# Patient Record
Sex: FEMALE | Race: WHITE | NOT HISPANIC OR LATINO | Employment: OTHER | RURAL
[De-identification: names, ages, dates, MRNs, and addresses within clinical notes are randomized per-mention and may not be internally consistent; named-entity substitution may affect disease eponyms.]

---

## 2020-02-04 ENCOUNTER — HISTORICAL (OUTPATIENT)
Dept: ADMINISTRATIVE | Facility: HOSPITAL | Age: 75
End: 2020-02-04

## 2020-02-05 LAB
LAB AP CLINICAL INFORMATION: NORMAL
LAB AP DIAGNOSIS - HISTORICAL: NORMAL
LAB AP GROSS PATHOLOGY - HISTORICAL: NORMAL
LAB AP SPECIMEN SUBMITTED - HISTORICAL: NORMAL

## 2020-03-10 ENCOUNTER — HISTORICAL (OUTPATIENT)
Dept: ADMINISTRATIVE | Facility: HOSPITAL | Age: 75
End: 2020-03-10

## 2020-07-08 ENCOUNTER — HISTORICAL (OUTPATIENT)
Dept: ADMINISTRATIVE | Facility: HOSPITAL | Age: 75
End: 2020-07-08

## 2020-07-08 LAB
ALBUMIN SERPL BCP-MCNC: 3.4 G/DL (ref 3.5–5)
ALBUMIN/GLOB SERPL: 0.9 {RATIO}
ALP SERPL-CCNC: 53 U/L (ref 55–142)
ALT SERPL W P-5'-P-CCNC: 24 U/L (ref 13–56)
ANION GAP SERPL CALCULATED.3IONS-SCNC: 9.1 MMOL/L (ref 7–16)
AST SERPL W P-5'-P-CCNC: 18 U/L (ref 15–37)
BASOPHILS # BLD AUTO: 0.04 X10E3/UL (ref 0–0.2)
BASOPHILS NFR BLD AUTO: 0.8 % (ref 0–1)
BILIRUB SERPL-MCNC: 0.4 MG/DL (ref 0–1.2)
BUN SERPL-MCNC: 23 MG/DL (ref 7–18)
BUN/CREAT SERPL: 26
CALCIUM SERPL-MCNC: 8.7 MG/DL (ref 8.5–10.1)
CHLORIDE SERPL-SCNC: 104 MMOL/L (ref 98–107)
CHOLEST SERPL-MCNC: 199 MG/DL
CHOLEST/HDLC SERPL: 4.1 {RATIO}
CO2 SERPL-SCNC: 30 MMOL/L (ref 21–32)
CREAT SERPL-MCNC: 0.9 MG/DL (ref 0.5–1.02)
EOSINOPHIL # BLD AUTO: 0.15 X10E3/UL (ref 0–0.5)
EOSINOPHIL NFR BLD AUTO: 3 % (ref 1–4)
ERYTHROCYTE [DISTWIDTH] IN BLOOD BY AUTOMATED COUNT: 13.9 % (ref 11.5–14.5)
GLOBULIN SER-MCNC: 3.7 G/DL (ref 2–4)
GLUCOSE SERPL-MCNC: 133 MG/DL (ref 74–106)
HCT VFR BLD AUTO: 41.6 % (ref 38–47)
HDLC SERPL-MCNC: 48 MG/DL
HGB BLD-MCNC: 12.8 G/DL (ref 12–16)
IMM GRANULOCYTES # BLD AUTO: 0.01 X10E3/UL (ref 0–0.04)
IMM GRANULOCYTES NFR BLD: 0.2 % (ref 0–0.4)
LDLC SERPL CALC-MCNC: 131 MG/DL
LYMPHOCYTES # BLD AUTO: 1.49 X10E3/UL (ref 1–4.8)
LYMPHOCYTES NFR BLD AUTO: 30.1 % (ref 27–41)
MCH RBC QN AUTO: 25.7 PG (ref 27–31)
MCHC RBC AUTO-ENTMCNC: 30.8 G/DL (ref 32–36)
MCV RBC AUTO: 83.5 FL (ref 80–96)
MONOCYTES # BLD AUTO: 0.62 X10E3/UL (ref 0–0.8)
MONOCYTES NFR BLD AUTO: 12.5 % (ref 2–6)
MPC BLD CALC-MCNC: 10.2 FL (ref 9.4–12.4)
NEUTROPHILS # BLD AUTO: 2.64 X10E3/UL (ref 1.8–7.7)
NEUTROPHILS NFR BLD AUTO: 53.4 % (ref 53–65)
NRBC # BLD AUTO: 0 X10E3/UL (ref 0–0)
NRBC, AUTO (.00): 0 /100 (ref 0–0)
PLATELET # BLD AUTO: 315 X10E3/UL (ref 150–400)
POTASSIUM SERPL-SCNC: 4.1 MMOL/L (ref 3.5–5.1)
PROT SERPL-MCNC: 7.1 G/DL (ref 6.4–8.2)
RBC # BLD AUTO: 4.98 X10E6/UL (ref 4.2–5.4)
SODIUM SERPL-SCNC: 139 MMOL/L (ref 136–145)
TRIGL SERPL-MCNC: 100 MG/DL
WBC # BLD AUTO: 4.95 X10E3/UL (ref 4.5–11)

## 2020-08-13 ENCOUNTER — HISTORICAL (OUTPATIENT)
Dept: ADMINISTRATIVE | Facility: HOSPITAL | Age: 75
End: 2020-08-13

## 2020-08-13 LAB
ALBUMIN SERPL BCP-MCNC: 3.3 G/DL (ref 3.5–5)
ALBUMIN/GLOB SERPL: 0.8 {RATIO}
ALP SERPL-CCNC: 58 U/L (ref 55–142)
ALT SERPL W P-5'-P-CCNC: 26 U/L (ref 13–56)
ANION GAP SERPL CALCULATED.3IONS-SCNC: 8 MMOL/L (ref 7–16)
AST SERPL W P-5'-P-CCNC: 15 U/L (ref 15–37)
BASOPHILS # BLD AUTO: 0.03 X10E3/UL (ref 0–0.2)
BASOPHILS NFR BLD AUTO: 0.6 % (ref 0–1)
BILIRUB SERPL-MCNC: 0.4 MG/DL (ref 0–1.2)
BUN SERPL-MCNC: 15 MG/DL (ref 7–18)
BUN/CREAT SERPL: 18
CALCIUM SERPL-MCNC: 9 MG/DL (ref 8.5–10.1)
CHLORIDE SERPL-SCNC: 103 MMOL/L (ref 98–107)
CHOLEST SERPL-MCNC: 206 MG/DL
CHOLEST/HDLC SERPL: 5.6 {RATIO}
CO2 SERPL-SCNC: 31 MMOL/L (ref 21–32)
CREAT SERPL-MCNC: 0.85 MG/DL (ref 0.5–1.02)
EOSINOPHIL # BLD AUTO: 0.13 X10E3/UL (ref 0–0.5)
EOSINOPHIL NFR BLD AUTO: 2.5 % (ref 1–4)
ERYTHROCYTE [DISTWIDTH] IN BLOOD BY AUTOMATED COUNT: 14.5 % (ref 11.5–14.5)
GLOBULIN SER-MCNC: 4.3 G/DL (ref 2–4)
GLUCOSE SERPL-MCNC: 106 MG/DL (ref 74–106)
HCT VFR BLD AUTO: 41.5 % (ref 38–47)
HDLC SERPL-MCNC: 37 MG/DL
HGB BLD-MCNC: 12.6 G/DL (ref 12–16)
IMM GRANULOCYTES # BLD AUTO: 0.02 X10E3/UL (ref 0–0.04)
IMM GRANULOCYTES NFR BLD: 0.4 % (ref 0–0.4)
LDLC SERPL CALC-MCNC: 136 MG/DL
LYMPHOCYTES # BLD AUTO: 1.56 X10E3/UL (ref 1–4.8)
LYMPHOCYTES NFR BLD AUTO: 29.5 % (ref 27–41)
MCH RBC QN AUTO: 25.6 PG (ref 27–31)
MCHC RBC AUTO-ENTMCNC: 30.4 G/DL (ref 32–36)
MCV RBC AUTO: 84.3 FL (ref 80–96)
MONOCYTES # BLD AUTO: 0.65 X10E3/UL (ref 0–0.8)
MONOCYTES NFR BLD AUTO: 12.3 % (ref 2–6)
MPC BLD CALC-MCNC: 10.2 FL (ref 9.4–12.4)
NEUTROPHILS # BLD AUTO: 2.89 X10E3/UL (ref 1.8–7.7)
NEUTROPHILS NFR BLD AUTO: 54.7 % (ref 53–65)
NRBC # BLD AUTO: 0 X10E3/UL (ref 0–0)
NRBC, AUTO (.00): 0 /100 (ref 0–0)
PLATELET # BLD AUTO: 309 X10E3/UL (ref 150–400)
POTASSIUM SERPL-SCNC: 4.2 MMOL/L (ref 3.5–5.1)
PROT SERPL-MCNC: 7.6 G/DL (ref 6.4–8.2)
RBC # BLD AUTO: 4.92 X10E6/UL (ref 4.2–5.4)
SODIUM SERPL-SCNC: 138 MMOL/L (ref 136–145)
TRIGL SERPL-MCNC: 167 MG/DL
WBC # BLD AUTO: 5.28 X10E3/UL (ref 4.5–11)

## 2020-09-17 ENCOUNTER — HISTORICAL (OUTPATIENT)
Dept: ADMINISTRATIVE | Facility: HOSPITAL | Age: 75
End: 2020-09-17

## 2020-09-17 LAB
ALBUMIN SERPL BCP-MCNC: 3.4 G/DL (ref 3.5–5)
ALBUMIN/GLOB SERPL: 0.8 {RATIO}
ALP SERPL-CCNC: 52 U/L (ref 55–142)
ALT SERPL W P-5'-P-CCNC: 27 U/L (ref 13–56)
ANION GAP SERPL CALCULATED.3IONS-SCNC: 9.3 MMOL/L (ref 7–16)
AST SERPL W P-5'-P-CCNC: 18 U/L (ref 15–37)
BASOPHILS # BLD AUTO: 0.04 X10E3/UL (ref 0–0.2)
BASOPHILS NFR BLD AUTO: 0.7 % (ref 0–1)
BILIRUB SERPL-MCNC: 0.4 MG/DL (ref 0–1.2)
BUN SERPL-MCNC: 16 MG/DL (ref 7–18)
BUN/CREAT SERPL: 17
CALCIUM SERPL-MCNC: 9.6 MG/DL (ref 8.5–10.1)
CHLORIDE SERPL-SCNC: 103 MMOL/L (ref 98–107)
CHOLEST SERPL-MCNC: 200 MG/DL
CHOLEST/HDLC SERPL: 4.9 {RATIO}
CO2 SERPL-SCNC: 30 MMOL/L (ref 21–32)
CREAT SERPL-MCNC: 0.92 MG/DL (ref 0.5–1.02)
EOSINOPHIL # BLD AUTO: 0.15 X10E3/UL (ref 0–0.5)
EOSINOPHIL NFR BLD AUTO: 2.5 % (ref 1–4)
ERYTHROCYTE [DISTWIDTH] IN BLOOD BY AUTOMATED COUNT: 14.7 % (ref 11.5–14.5)
GLOBULIN SER-MCNC: 4.2 G/DL (ref 2–4)
GLUCOSE SERPL-MCNC: 88 MG/DL (ref 74–106)
HCT VFR BLD AUTO: 41.1 % (ref 38–47)
HDLC SERPL-MCNC: 41 MG/DL
HGB BLD-MCNC: 12.8 G/DL (ref 12–16)
IMM GRANULOCYTES # BLD AUTO: 0.02 X10E3/UL (ref 0–0.04)
IMM GRANULOCYTES NFR BLD: 0.3 % (ref 0–0.4)
LDLC SERPL CALC-MCNC: 126 MG/DL
LYMPHOCYTES # BLD AUTO: 1.9 X10E3/UL (ref 1–4.8)
LYMPHOCYTES NFR BLD AUTO: 31.3 % (ref 27–41)
MCH RBC QN AUTO: 26.2 PG (ref 27–31)
MCHC RBC AUTO-ENTMCNC: 31.1 G/DL (ref 32–36)
MCV RBC AUTO: 84 FL (ref 80–96)
MONOCYTES # BLD AUTO: 0.73 X10E3/UL (ref 0–0.8)
MONOCYTES NFR BLD AUTO: 12 % (ref 2–6)
MPC BLD CALC-MCNC: 10.2 FL (ref 9.4–12.4)
NEUTROPHILS # BLD AUTO: 3.24 X10E3/UL (ref 1.8–7.7)
NEUTROPHILS NFR BLD AUTO: 53.2 % (ref 53–65)
NRBC # BLD AUTO: 0 X10E3/UL (ref 0–0)
NRBC, AUTO (.00): 0 /100 (ref 0–0)
PLATELET # BLD AUTO: 365 X10E3/UL (ref 150–400)
POTASSIUM SERPL-SCNC: 4.3 MMOL/L (ref 3.5–5.1)
PROT SERPL-MCNC: 7.6 G/DL (ref 6.4–8.2)
RBC # BLD AUTO: 4.89 X10E6/UL (ref 4.2–5.4)
SODIUM SERPL-SCNC: 138 MMOL/L (ref 136–145)
TRIGL SERPL-MCNC: 166 MG/DL
WBC # BLD AUTO: 6.08 X10E3/UL (ref 4.5–11)

## 2020-12-09 ENCOUNTER — HISTORICAL (OUTPATIENT)
Dept: ADMINISTRATIVE | Facility: HOSPITAL | Age: 75
End: 2020-12-09

## 2021-01-06 ENCOUNTER — HISTORICAL (OUTPATIENT)
Dept: ADMINISTRATIVE | Facility: HOSPITAL | Age: 76
End: 2021-01-06

## 2021-01-06 LAB
ALBUMIN SERPL BCP-MCNC: 4 G/DL (ref 3.5–5)
ALBUMIN/GLOB SERPL: 0.9 {RATIO}
ALP SERPL-CCNC: 76 U/L (ref 55–142)
ALT SERPL W P-5'-P-CCNC: 33 U/L (ref 13–56)
ANION GAP SERPL CALCULATED.3IONS-SCNC: 8.6 MMOL/L (ref 7–16)
AST SERPL W P-5'-P-CCNC: 18 U/L (ref 15–37)
BASOPHILS # BLD AUTO: 0.04 X10E3/UL (ref 0–0.2)
BASOPHILS NFR BLD AUTO: 0.5 % (ref 0–1)
BILIRUB SERPL-MCNC: 0.4 MG/DL (ref 0–1.2)
BUN SERPL-MCNC: 25 MG/DL (ref 7–18)
BUN/CREAT SERPL: 27
CALCIUM SERPL-MCNC: 8.9 MG/DL (ref 8.5–10.1)
CHLORIDE SERPL-SCNC: 104 MMOL/L (ref 98–107)
CHOLEST SERPL-MCNC: 162 MG/DL
CHOLEST/HDLC SERPL: 2.6 {RATIO}
CO2 SERPL-SCNC: 31 MMOL/L (ref 21–32)
CREAT SERPL-MCNC: 0.92 MG/DL (ref 0.5–1.02)
EOSINOPHIL # BLD AUTO: 0.2 X10E3/UL (ref 0–0.5)
EOSINOPHIL NFR BLD AUTO: 2.5 % (ref 1–4)
ERYTHROCYTE [DISTWIDTH] IN BLOOD BY AUTOMATED COUNT: 14.3 % (ref 11.5–14.5)
GLOBULIN SER-MCNC: 4.3 G/DL (ref 2–4)
GLUCOSE SERPL-MCNC: 136 MG/DL (ref 74–106)
HCT VFR BLD AUTO: 45.7 % (ref 38–47)
HDLC SERPL-MCNC: 63 MG/DL
HGB BLD-MCNC: 14 G/DL (ref 12–16)
IMM GRANULOCYTES # BLD AUTO: 0.03 X10E3/UL (ref 0–0.04)
IMM GRANULOCYTES NFR BLD: 0.4 % (ref 0–0.4)
LDLC SERPL CALC-MCNC: 80 MG/DL
LYMPHOCYTES # BLD AUTO: 1.86 X10E3/UL (ref 1–4.8)
LYMPHOCYTES NFR BLD AUTO: 23.6 % (ref 27–41)
MCH RBC QN AUTO: 26.1 PG (ref 27–31)
MCHC RBC AUTO-ENTMCNC: 30.6 G/DL (ref 32–36)
MCV RBC AUTO: 85.1 FL (ref 80–96)
MONOCYTES # BLD AUTO: 0.88 X10E3/UL (ref 0–0.8)
MONOCYTES NFR BLD AUTO: 11.2 % (ref 2–6)
MPC BLD CALC-MCNC: 10.7 FL (ref 9.4–12.4)
NEUTROPHILS # BLD AUTO: 4.86 X10E3/UL (ref 1.8–7.7)
NEUTROPHILS NFR BLD AUTO: 61.8 % (ref 53–65)
NRBC # BLD AUTO: 0 X10E3/UL (ref 0–0)
NRBC, AUTO (.00): 0 /100 (ref 0–0)
PLATELET # BLD AUTO: 379 X10E3/UL (ref 150–400)
POTASSIUM SERPL-SCNC: 4.6 MMOL/L (ref 3.5–5.1)
PROT SERPL-MCNC: 8.3 G/DL (ref 6.4–8.2)
RBC # BLD AUTO: 5.37 X10E6/UL (ref 4.2–5.4)
SODIUM SERPL-SCNC: 139 MMOL/L (ref 136–145)
TRIGL SERPL-MCNC: 95 MG/DL
WBC # BLD AUTO: 7.87 X10E3/UL (ref 4.5–11)

## 2021-03-31 RX ORDER — ASPIRIN 325 MG
325 TABLET ORAL DAILY
COMMUNITY
End: 2021-12-03

## 2021-04-05 ENCOUNTER — OFFICE VISIT (OUTPATIENT)
Dept: DERMATOLOGY | Facility: CLINIC | Age: 76
End: 2021-04-05
Payer: MEDICARE

## 2021-04-05 VITALS — BODY MASS INDEX: 35.48 KG/M2 | HEIGHT: 59 IN | RESPIRATION RATE: 18 BRPM | WEIGHT: 176 LBS

## 2021-04-05 DIAGNOSIS — L40.0 PLAQUE PSORIASIS: Primary | ICD-10-CM

## 2021-04-05 DIAGNOSIS — Z79.899 HIGH RISK MEDICATION USE: ICD-10-CM

## 2021-04-05 PROCEDURE — 99214 OFFICE O/P EST MOD 30 MIN: CPT | Mod: ,,, | Performed by: DERMATOLOGY

## 2021-04-05 PROCEDURE — 99214 PR OFFICE/OUTPT VISIT, EST, LEVL IV, 30-39 MIN: ICD-10-PCS | Mod: ,,, | Performed by: DERMATOLOGY

## 2021-04-05 RX ORDER — BUTALBITAL, ACETAMINOPHEN AND CAFFEINE 300; 40; 50 MG/1; MG/1; MG/1
CAPSULE ORAL
COMMUNITY
Start: 2021-02-04 | End: 2021-12-03 | Stop reason: SDUPTHER

## 2021-04-05 RX ORDER — ACITRETIN 25 MG/1
CAPSULE ORAL
COMMUNITY
Start: 2020-09-17 | End: 2021-04-05

## 2021-04-05 RX ORDER — POTASSIUM CHLORIDE 1500 MG/1
TABLET, EXTENDED RELEASE ORAL
COMMUNITY
Start: 2020-07-17 | End: 2021-12-03 | Stop reason: SDUPTHER

## 2021-04-05 RX ORDER — ACITRETIN 25 MG/1
25 CAPSULE ORAL DAILY
COMMUNITY
Start: 2021-03-13 | End: 2021-04-05

## 2021-04-05 RX ORDER — DULOXETIN HYDROCHLORIDE 30 MG/1
CAPSULE, DELAYED RELEASE ORAL
COMMUNITY
Start: 2020-07-24 | End: 2021-12-03 | Stop reason: SDUPTHER

## 2021-04-05 RX ORDER — CITALOPRAM 10 MG/1
10 TABLET ORAL DAILY
COMMUNITY
Start: 2021-01-06 | End: 2021-06-01 | Stop reason: SDUPTHER

## 2021-04-05 RX ORDER — METOPROLOL TARTRATE 25 MG/1
TABLET, FILM COATED ORAL
COMMUNITY
Start: 2020-10-14 | End: 2021-06-01 | Stop reason: SDUPTHER

## 2021-04-05 RX ORDER — HYDROCHLOROTHIAZIDE 25 MG/1
25 TABLET ORAL DAILY
COMMUNITY
Start: 2021-01-06 | End: 2021-06-01 | Stop reason: SDUPTHER

## 2021-04-05 RX ORDER — HYDROCHLOROTHIAZIDE 25 MG/1
TABLET ORAL
COMMUNITY
Start: 2020-07-17 | End: 2022-01-11

## 2021-04-05 RX ORDER — CIPROFLOXACIN HYDROCHLORIDE 3 MG/ML
SOLUTION/ DROPS OPHTHALMIC
COMMUNITY
Start: 2020-09-15 | End: 2022-01-11

## 2021-04-05 RX ORDER — DULOXETIN HYDROCHLORIDE 30 MG/1
CAPSULE, DELAYED RELEASE ORAL
COMMUNITY
Start: 2021-01-31 | End: 2021-12-03 | Stop reason: SDUPTHER

## 2021-04-05 RX ORDER — LATANOPROST 50 UG/ML
SOLUTION/ DROPS OPHTHALMIC
COMMUNITY
Start: 2021-02-24 | End: 2022-12-27

## 2021-04-05 RX ORDER — TOPIRAMATE 50 MG/1
TABLET, FILM COATED ORAL
COMMUNITY
Start: 2021-02-04 | End: 2022-01-31

## 2021-04-05 RX ORDER — ACITRETIN 25 MG/1
CAPSULE ORAL
Qty: 30 CAPSULE | Refills: 2 | Status: SHIPPED | OUTPATIENT
Start: 2021-04-05 | End: 2021-06-01 | Stop reason: SDUPTHER

## 2021-04-05 RX ORDER — OMEPRAZOLE 40 MG/1
40 CAPSULE, DELAYED RELEASE ORAL DAILY
COMMUNITY
Start: 2021-01-06 | End: 2021-06-01 | Stop reason: SDUPTHER

## 2021-04-05 RX ORDER — POTASSIUM CHLORIDE 20 MEQ/1
20 TABLET, EXTENDED RELEASE ORAL DAILY
COMMUNITY
Start: 2021-01-06 | End: 2021-06-01 | Stop reason: SDUPTHER

## 2021-04-05 RX ORDER — BUTALBITAL, ACETAMINOPHEN AND CAFFEINE 50; 325; 40 MG/1; MG/1; MG/1
CAPSULE ORAL
COMMUNITY
Start: 2021-02-10 | End: 2022-01-31

## 2021-04-05 RX ORDER — CITALOPRAM 10 MG/1
TABLET ORAL
COMMUNITY
Start: 2020-10-14 | End: 2022-01-11

## 2021-04-05 RX ORDER — OMEPRAZOLE 40 MG/1
CAPSULE, DELAYED RELEASE ORAL
COMMUNITY
Start: 2020-10-14 | End: 2021-12-03 | Stop reason: SDUPTHER

## 2021-04-05 RX ORDER — CEFUROXIME AXETIL 500 MG/1
TABLET ORAL
COMMUNITY
Start: 2021-02-04 | End: 2021-12-03

## 2021-04-06 ENCOUNTER — HISTORICAL (OUTPATIENT)
Dept: ADMINISTRATIVE | Facility: HOSPITAL | Age: 76
End: 2021-04-06

## 2021-04-06 LAB
ALBUMIN SERPL BCP-MCNC: 3.7 G/DL (ref 3.5–5)
ALBUMIN/GLOB SERPL: 1 {RATIO}
ALP SERPL-CCNC: 61 U/L (ref 55–142)
ALT SERPL W P-5'-P-CCNC: 29 U/L (ref 13–56)
ANION GAP SERPL CALCULATED.3IONS-SCNC: 10.8 MMOL/L (ref 7–16)
AST SERPL W P-5'-P-CCNC: 19 U/L (ref 15–37)
BASOPHILS # BLD AUTO: 0.04 X10E3/UL (ref 0–0.2)
BASOPHILS NFR BLD AUTO: 0.7 % (ref 0–1)
BILIRUB SERPL-MCNC: 0.4 MG/DL (ref 0–1.2)
BUN SERPL-MCNC: 24 MG/DL (ref 7–18)
BUN/CREAT SERPL: 26
CALCIUM SERPL-MCNC: 8.9 MG/DL (ref 8.5–10.1)
CHLORIDE SERPL-SCNC: 104 MMOL/L (ref 98–107)
CHOLEST SERPL-MCNC: 210 MG/DL
CHOLEST/HDLC SERPL: 3.8 {RATIO}
CO2 SERPL-SCNC: 30 MMOL/L (ref 21–32)
CREAT SERPL-MCNC: 0.93 MG/DL (ref 0.5–1.02)
EOSINOPHIL # BLD AUTO: 0.16 X10E3/UL (ref 0–0.5)
EOSINOPHIL NFR BLD AUTO: 2.9 % (ref 1–4)
ERYTHROCYTE [DISTWIDTH] IN BLOOD BY AUTOMATED COUNT: 14.6 % (ref 11.5–14.5)
GLOBULIN SER-MCNC: 3.8 G/DL (ref 2–4)
GLUCOSE SERPL-MCNC: 155 MG/DL (ref 74–106)
HCT VFR BLD AUTO: 44.7 % (ref 38–47)
HDLC SERPL-MCNC: 55 MG/DL
HGB BLD-MCNC: 13.7 G/DL (ref 12–16)
IMM GRANULOCYTES # BLD AUTO: 0.01 X10E3/UL (ref 0–0.04)
IMM GRANULOCYTES NFR BLD: 0.2 % (ref 0–0.4)
LDLC SERPL CALC-MCNC: 132 MG/DL
LYMPHOCYTES # BLD AUTO: 1.4 X10E3/UL (ref 1–4.8)
LYMPHOCYTES NFR BLD AUTO: 25.4 % (ref 27–41)
MCH RBC QN AUTO: 26.2 PG (ref 27–31)
MCHC RBC AUTO-ENTMCNC: 30.6 G/DL (ref 32–36)
MCV RBC AUTO: 85.5 FL (ref 80–96)
MONOCYTES # BLD AUTO: 0.58 X10E3/UL (ref 0–0.8)
MONOCYTES NFR BLD AUTO: 10.5 % (ref 2–6)
MPC BLD CALC-MCNC: 10.6 FL (ref 9.4–12.4)
NEUTROPHILS # BLD AUTO: 3.32 X10E3/UL (ref 1.8–7.7)
NEUTROPHILS NFR BLD AUTO: 60.3 % (ref 53–65)
NRBC # BLD AUTO: 0 X10E3/UL (ref 0–0)
NRBC, AUTO (.00): 0 /100 (ref 0–0)
PLATELET # BLD AUTO: 340 X10E3/UL (ref 150–400)
POTASSIUM SERPL-SCNC: 4.8 MMOL/L (ref 3.5–5.1)
PROT SERPL-MCNC: 7.5 G/DL (ref 6.4–8.2)
RBC # BLD AUTO: 5.23 X10E6/UL (ref 4.2–5.4)
SODIUM SERPL-SCNC: 140 MMOL/L (ref 136–145)
TRIGL SERPL-MCNC: 115 MG/DL
WBC # BLD AUTO: 5.51 X10E3/UL (ref 4.5–11)

## 2021-05-05 ENCOUNTER — HISTORICAL (OUTPATIENT)
Dept: ADMINISTRATIVE | Facility: HOSPITAL | Age: 76
End: 2021-05-05

## 2021-05-11 VITALS
HEIGHT: 69 IN | RESPIRATION RATE: 18 BRPM | OXYGEN SATURATION: 97 % | DIASTOLIC BLOOD PRESSURE: 86 MMHG | TEMPERATURE: 97 F | SYSTOLIC BLOOD PRESSURE: 140 MMHG | WEIGHT: 186 LBS | BODY MASS INDEX: 27.55 KG/M2 | HEART RATE: 101 BPM

## 2021-06-01 ENCOUNTER — OFFICE VISIT (OUTPATIENT)
Dept: FAMILY MEDICINE | Facility: CLINIC | Age: 76
End: 2021-06-01
Payer: MEDICARE

## 2021-06-01 VITALS
OXYGEN SATURATION: 96 % | BODY MASS INDEX: 38 KG/M2 | TEMPERATURE: 97 F | HEART RATE: 96 BPM | WEIGHT: 188.5 LBS | SYSTOLIC BLOOD PRESSURE: 124 MMHG | DIASTOLIC BLOOD PRESSURE: 76 MMHG | RESPIRATION RATE: 18 BRPM | HEIGHT: 59 IN

## 2021-06-01 DIAGNOSIS — L40.0 PLAQUE PSORIASIS: Chronic | ICD-10-CM

## 2021-06-01 DIAGNOSIS — K21.9 GASTROESOPHAGEAL REFLUX DISEASE, UNSPECIFIED WHETHER ESOPHAGITIS PRESENT: Chronic | ICD-10-CM

## 2021-06-01 DIAGNOSIS — I10 ESSENTIAL HYPERTENSION: Primary | ICD-10-CM

## 2021-06-01 DIAGNOSIS — E78.5 HYPERLIPIDEMIA, UNSPECIFIED HYPERLIPIDEMIA TYPE: Chronic | ICD-10-CM

## 2021-06-01 LAB
BACTERIA #/AREA URNS HPF: ABNORMAL /HPF
BILIRUB UR QL STRIP: NEGATIVE
CLARITY UR: CLEAR
COLOR UR: YELLOW
CREAT UR-MCNC: 284 MG/DL (ref 28–219)
GLUCOSE UR STRIP-MCNC: 100 MG/DL
KETONES UR STRIP-SCNC: NEGATIVE MG/DL
LEUKOCYTE ESTERASE UR QL STRIP: ABNORMAL
MICROALBUMIN UR-MCNC: 3 MG/DL (ref 0–2.8)
MICROALBUMIN/CREAT RATIO PNL UR: 10.6 MG/G (ref 0–30)
NITRITE UR QL STRIP: NEGATIVE
PH UR STRIP: 6 PH UNITS
PROT UR QL STRIP: ABNORMAL
RBC # UR STRIP: NEGATIVE /UL
RBC #/AREA URNS HPF: ABNORMAL /HPF
SP GR UR STRIP: >=1.03
SQUAMOUS #/AREA URNS LPF: ABNORMAL /LPF
UROBILINOGEN UR STRIP-ACNC: 0.2 MG/DL
WBC #/AREA URNS HPF: ABNORMAL /HPF

## 2021-06-01 PROCEDURE — 99214 PR OFFICE/OUTPT VISIT, EST, LEVL IV, 30-39 MIN: ICD-10-PCS | Mod: ,,, | Performed by: FAMILY MEDICINE

## 2021-06-01 PROCEDURE — 82043 MICROALBUMIN / CREATININE RATIO URINE: ICD-10-PCS | Mod: ,,, | Performed by: CLINICAL MEDICAL LABORATORY

## 2021-06-01 PROCEDURE — 82570 MICROALBUMIN / CREATININE RATIO URINE: ICD-10-PCS | Mod: ,,, | Performed by: CLINICAL MEDICAL LABORATORY

## 2021-06-01 PROCEDURE — 99214 OFFICE O/P EST MOD 30 MIN: CPT | Mod: ,,, | Performed by: FAMILY MEDICINE

## 2021-06-01 PROCEDURE — 82043 UR ALBUMIN QUANTITATIVE: CPT | Mod: ,,, | Performed by: CLINICAL MEDICAL LABORATORY

## 2021-06-01 PROCEDURE — 81001 URINALYSIS: ICD-10-PCS | Mod: ,,, | Performed by: CLINICAL MEDICAL LABORATORY

## 2021-06-01 PROCEDURE — 82570 ASSAY OF URINE CREATININE: CPT | Mod: ,,, | Performed by: CLINICAL MEDICAL LABORATORY

## 2021-06-01 PROCEDURE — 81001 URINALYSIS AUTO W/SCOPE: CPT | Mod: ,,, | Performed by: CLINICAL MEDICAL LABORATORY

## 2021-06-01 RX ORDER — HYDROCHLOROTHIAZIDE 25 MG/1
25 TABLET ORAL DAILY
Qty: 90 TABLET | Refills: 1 | Status: SHIPPED | OUTPATIENT
Start: 2021-06-01 | End: 2022-01-14 | Stop reason: SDUPTHER

## 2021-06-01 RX ORDER — ROSUVASTATIN CALCIUM 20 MG/1
TABLET, COATED ORAL
COMMUNITY
Start: 2020-12-07 | End: 2021-06-01 | Stop reason: SDUPTHER

## 2021-06-01 RX ORDER — METOPROLOL TARTRATE 25 MG/1
25 TABLET, FILM COATED ORAL 2 TIMES DAILY
Qty: 180 TABLET | Refills: 1 | Status: SHIPPED | OUTPATIENT
Start: 2021-06-01 | End: 2022-01-14 | Stop reason: SDUPTHER

## 2021-06-01 RX ORDER — ROSUVASTATIN CALCIUM 20 MG/1
20 TABLET, COATED ORAL DAILY
Qty: 90 TABLET | Refills: 1 | Status: SHIPPED | OUTPATIENT
Start: 2021-06-01 | End: 2022-01-11

## 2021-06-01 RX ORDER — ASPIRIN 81 MG/1
81 TABLET ORAL DAILY
COMMUNITY

## 2021-06-01 RX ORDER — ACITRETIN 25 MG/1
CAPSULE ORAL
Qty: 30 CAPSULE | Refills: 2 | Status: SHIPPED | OUTPATIENT
Start: 2021-06-01 | End: 2021-07-12 | Stop reason: SDUPTHER

## 2021-06-01 RX ORDER — CITALOPRAM 10 MG/1
10 TABLET ORAL DAILY
Qty: 90 TABLET | Refills: 1 | Status: SHIPPED | OUTPATIENT
Start: 2021-06-01 | End: 2022-10-31

## 2021-06-01 RX ORDER — POTASSIUM CHLORIDE 20 MEQ/1
20 TABLET, EXTENDED RELEASE ORAL DAILY
Qty: 90 TABLET | Refills: 1 | Status: SHIPPED | OUTPATIENT
Start: 2021-06-01 | End: 2022-01-11

## 2021-06-01 RX ORDER — OMEPRAZOLE 40 MG/1
40 CAPSULE, DELAYED RELEASE ORAL DAILY
Qty: 90 CAPSULE | Refills: 1 | Status: SHIPPED | OUTPATIENT
Start: 2021-06-01 | End: 2022-01-14 | Stop reason: SDUPTHER

## 2021-06-03 DIAGNOSIS — N39.0 URINARY TRACT INFECTION WITHOUT HEMATURIA, SITE UNSPECIFIED: Primary | ICD-10-CM

## 2021-06-03 RX ORDER — NITROFURANTOIN 25; 75 MG/1; MG/1
100 CAPSULE ORAL 2 TIMES DAILY
Qty: 14 CAPSULE | Refills: 0 | Status: SHIPPED | OUTPATIENT
Start: 2021-06-03 | End: 2021-12-03 | Stop reason: SDUPTHER

## 2021-07-07 ENCOUNTER — INFUSION (OUTPATIENT)
Dept: INFUSION THERAPY | Facility: HOSPITAL | Age: 76
End: 2021-07-07
Attending: FAMILY MEDICINE
Payer: MEDICARE

## 2021-07-07 VITALS
HEART RATE: 74 BPM | BODY MASS INDEX: 36.69 KG/M2 | DIASTOLIC BLOOD PRESSURE: 66 MMHG | HEIGHT: 60 IN | TEMPERATURE: 97 F | OXYGEN SATURATION: 99 % | SYSTOLIC BLOOD PRESSURE: 130 MMHG | RESPIRATION RATE: 18 BRPM | WEIGHT: 186.88 LBS

## 2021-07-07 DIAGNOSIS — M81.0 OSTEOPOROSIS, UNSPECIFIED OSTEOPOROSIS TYPE, UNSPECIFIED PATHOLOGICAL FRACTURE PRESENCE: Primary | ICD-10-CM

## 2021-07-07 LAB — CALCIUM SERPL-MCNC: 8.9 MG/DL (ref 8.5–10.1)

## 2021-07-07 PROCEDURE — 96372 THER/PROPH/DIAG INJ SC/IM: CPT

## 2021-07-07 PROCEDURE — 63600175 PHARM REV CODE 636 W HCPCS: Performed by: FAMILY MEDICINE

## 2021-07-07 PROCEDURE — 82310 ASSAY OF CALCIUM: CPT | Performed by: FAMILY MEDICINE

## 2021-07-07 PROCEDURE — 36415 COLL VENOUS BLD VENIPUNCTURE: CPT | Performed by: FAMILY MEDICINE

## 2021-07-07 RX ADMIN — DENOSUMAB 60 MG: 60 INJECTION SUBCUTANEOUS at 10:07

## 2021-07-12 ENCOUNTER — OFFICE VISIT (OUTPATIENT)
Dept: DERMATOLOGY | Facility: CLINIC | Age: 76
End: 2021-07-12
Payer: MEDICARE

## 2021-07-12 VITALS — BODY MASS INDEX: 36.52 KG/M2 | RESPIRATION RATE: 16 BRPM | WEIGHT: 186 LBS | HEIGHT: 60 IN

## 2021-07-12 DIAGNOSIS — Z79.899 HIGH RISK MEDICATION USE: ICD-10-CM

## 2021-07-12 DIAGNOSIS — D48.5 NEOPLASM OF UNCERTAIN BEHAVIOR OF SKIN: ICD-10-CM

## 2021-07-12 DIAGNOSIS — L40.0 PLAQUE PSORIASIS: Chronic | ICD-10-CM

## 2021-07-12 DIAGNOSIS — L40.9 PSORIASIS: Primary | ICD-10-CM

## 2021-07-12 PROCEDURE — 99213 PR OFFICE/OUTPT VISIT, EST, LEVL III, 20-29 MIN: ICD-10-PCS | Mod: 25,,, | Performed by: DERMATOLOGY

## 2021-07-12 PROCEDURE — 88305 TISSUE EXAM BY PATHOLOGIST: CPT | Mod: 26,,, | Performed by: PATHOLOGY

## 2021-07-12 PROCEDURE — 99213 OFFICE O/P EST LOW 20 MIN: CPT | Mod: 25,,, | Performed by: DERMATOLOGY

## 2021-07-12 PROCEDURE — 11102 TANGNTL BX SKIN SINGLE LES: CPT | Mod: ,,, | Performed by: DERMATOLOGY

## 2021-07-12 PROCEDURE — 88305 PATHOLOGY, DERMATOLOGY: ICD-10-PCS | Mod: 26,,, | Performed by: PATHOLOGY

## 2021-07-12 PROCEDURE — 88305 TISSUE EXAM BY PATHOLOGIST: CPT | Mod: SUR | Performed by: DERMATOLOGY

## 2021-07-12 PROCEDURE — 11102 PR TANGENTIAL BIOPSY, SKIN, SINGLE LESION: ICD-10-PCS | Mod: ,,, | Performed by: DERMATOLOGY

## 2021-07-12 RX ORDER — ACITRETIN 25 MG/1
CAPSULE ORAL
Qty: 30 CAPSULE | Refills: 2 | Status: SHIPPED | OUTPATIENT
Start: 2021-07-12 | End: 2021-09-24 | Stop reason: SDUPTHER

## 2021-07-14 LAB
ESTROGEN SERPL-MCNC: NORMAL PG/ML
LAB AP GROSS DESCRIPTION: NORMAL
LAB AP LABORATORY NOTES: NORMAL
LAB AP SPEC A DDX: NORMAL
LAB AP SPEC A MORPHOLOGY: NORMAL
LAB AP SPEC A PROCEDURE: NORMAL
T3RU NFR SERPL: NORMAL %

## 2021-08-16 ENCOUNTER — HOSPITAL ENCOUNTER (OUTPATIENT)
Dept: RADIOLOGY | Facility: HOSPITAL | Age: 76
Discharge: HOME | End: 2021-08-16
Attending: FAMILY MEDICINE
Payer: MEDICARE

## 2021-08-16 ENCOUNTER — OFFICE VISIT (OUTPATIENT)
Dept: FAMILY MEDICINE | Facility: CLINIC | Age: 76
End: 2021-08-16
Payer: MEDICARE

## 2021-08-16 VITALS
HEART RATE: 79 BPM | DIASTOLIC BLOOD PRESSURE: 69 MMHG | HEIGHT: 59 IN | OXYGEN SATURATION: 96 % | BODY MASS INDEX: 37.09 KG/M2 | SYSTOLIC BLOOD PRESSURE: 116 MMHG | WEIGHT: 184 LBS | RESPIRATION RATE: 18 BRPM

## 2021-08-16 DIAGNOSIS — Z11.52 ENCOUNTER FOR SCREENING LABORATORY TESTING FOR COVID-19 VIRUS: ICD-10-CM

## 2021-08-16 DIAGNOSIS — R05.9 COUGH: ICD-10-CM

## 2021-08-16 DIAGNOSIS — M54.2 NECK PAIN: ICD-10-CM

## 2021-08-16 DIAGNOSIS — Z03.818 ENCNTR FOR OBS FOR SUSP EXPSR TO OTH BIOLG AGENTS RULED OUT: ICD-10-CM

## 2021-08-16 DIAGNOSIS — J01.00 ACUTE NON-RECURRENT MAXILLARY SINUSITIS: ICD-10-CM

## 2021-08-16 DIAGNOSIS — R51.9 WORSENING HEADACHES: Primary | Chronic | ICD-10-CM

## 2021-08-16 DIAGNOSIS — M54.2 NECK PAIN: Chronic | ICD-10-CM

## 2021-08-16 LAB
CTP QC/QA: YES
FLUAV AG NPH QL: NEGATIVE
FLUBV AG NPH QL: NEGATIVE
SARS-COV-2 AG RESP QL IA.RAPID: NEGATIVE

## 2021-08-16 PROCEDURE — 99214 OFFICE O/P EST MOD 30 MIN: CPT | Mod: ,,, | Performed by: FAMILY MEDICINE

## 2021-08-16 PROCEDURE — 72050 X-RAY EXAM NECK SPINE 4/5VWS: CPT | Mod: TC

## 2021-08-16 PROCEDURE — 96372 PR INJECTION,THERAP/PROPH/DIAG2ST, IM OR SUBCUT: ICD-10-PCS | Mod: ,,, | Performed by: FAMILY MEDICINE

## 2021-08-16 PROCEDURE — 87428 SARSCOV & INF VIR A&B AG IA: CPT | Mod: RHCUB | Performed by: FAMILY MEDICINE

## 2021-08-16 PROCEDURE — 96372 THER/PROPH/DIAG INJ SC/IM: CPT | Mod: ,,, | Performed by: FAMILY MEDICINE

## 2021-08-16 PROCEDURE — 99214 PR OFFICE/OUTPT VISIT, EST, LEVL IV, 30-39 MIN: ICD-10-PCS | Mod: ,,, | Performed by: FAMILY MEDICINE

## 2021-08-16 RX ORDER — KETOROLAC TROMETHAMINE 30 MG/ML
60 INJECTION, SOLUTION INTRAMUSCULAR; INTRAVENOUS
Status: COMPLETED | OUTPATIENT
Start: 2021-08-16 | End: 2021-08-16

## 2021-08-16 RX ADMIN — KETOROLAC TROMETHAMINE 60 MG: 30 INJECTION, SOLUTION INTRAMUSCULAR; INTRAVENOUS at 10:08

## 2021-09-21 ENCOUNTER — TELEPHONE (OUTPATIENT)
Dept: DERMATOLOGY | Facility: CLINIC | Age: 76
End: 2021-09-21

## 2021-09-21 DIAGNOSIS — Z79.899 HIGH RISK MEDICATION USE: Primary | ICD-10-CM

## 2021-09-24 ENCOUNTER — OFFICE VISIT (OUTPATIENT)
Dept: DERMATOLOGY | Facility: CLINIC | Age: 76
End: 2021-09-24
Payer: MEDICARE

## 2021-09-24 VITALS — BODY MASS INDEX: 37.09 KG/M2 | HEIGHT: 59 IN | WEIGHT: 184 LBS | RESPIRATION RATE: 16 BRPM

## 2021-09-24 DIAGNOSIS — L40.0 PLAQUE PSORIASIS: Chronic | ICD-10-CM

## 2021-09-24 DIAGNOSIS — L57.0 ACTINIC KERATOSES: ICD-10-CM

## 2021-09-24 DIAGNOSIS — L57.8 OTHER SKIN CHANGES DUE TO CHRONIC EXPOSURE TO NONIONIZING RADIATION: Primary | ICD-10-CM

## 2021-09-24 DIAGNOSIS — L40.9 PSORIASIS: ICD-10-CM

## 2021-09-24 DIAGNOSIS — L20.84 INTRINSIC ECZEMA: ICD-10-CM

## 2021-09-24 DIAGNOSIS — Z79.899 HIGH RISK MEDICATION USE: ICD-10-CM

## 2021-09-24 PROCEDURE — 99214 PR OFFICE/OUTPT VISIT, EST, LEVL IV, 30-39 MIN: ICD-10-PCS | Mod: 25,,, | Performed by: DERMATOLOGY

## 2021-09-24 PROCEDURE — 17000 PR DESTRUCTION(LASER SURGERY,CRYOSURGERY,CHEMOSURGERY),PREMALIGNANT LESIONS,FIRST LESION: ICD-10-PCS | Mod: ,,, | Performed by: DERMATOLOGY

## 2021-09-24 PROCEDURE — 17000 DESTRUCT PREMALG LESION: CPT | Mod: ,,, | Performed by: DERMATOLOGY

## 2021-09-24 PROCEDURE — 17003 DESTRUCT PREMALG LES 2-14: CPT | Mod: ,,, | Performed by: DERMATOLOGY

## 2021-09-24 PROCEDURE — 99214 OFFICE O/P EST MOD 30 MIN: CPT | Mod: 25,,, | Performed by: DERMATOLOGY

## 2021-09-24 PROCEDURE — 17003 DESTRUCTION, PREMALIGNANT LESIONS; SECOND THROUGH 14 LESIONS: ICD-10-PCS | Mod: ,,, | Performed by: DERMATOLOGY

## 2021-09-24 RX ORDER — CIPROFLOXACIN 250 MG/1
250 TABLET, FILM COATED ORAL EVERY 12 HOURS
COMMUNITY
Start: 2021-09-03 | End: 2021-12-03

## 2021-09-24 RX ORDER — ACITRETIN 25 MG/1
CAPSULE ORAL
Qty: 30 CAPSULE | Refills: 5 | Status: SHIPPED | OUTPATIENT
Start: 2021-09-24 | End: 2021-10-26 | Stop reason: SDUPTHER

## 2021-10-26 DIAGNOSIS — L40.0 PLAQUE PSORIASIS: Chronic | ICD-10-CM

## 2021-10-27 RX ORDER — ACITRETIN 25 MG/1
CAPSULE ORAL
Qty: 30 CAPSULE | Refills: 2 | Status: SHIPPED | OUTPATIENT
Start: 2021-10-27 | End: 2021-12-18 | Stop reason: SDUPTHER

## 2021-11-15 PROBLEM — J01.00 ACUTE NON-RECURRENT MAXILLARY SINUSITIS: Status: RESOLVED | Noted: 2021-08-16 | Resolved: 2021-11-15

## 2021-12-03 ENCOUNTER — OFFICE VISIT (OUTPATIENT)
Dept: FAMILY MEDICINE | Facility: CLINIC | Age: 76
End: 2021-12-03
Payer: MEDICARE

## 2021-12-03 VITALS
OXYGEN SATURATION: 98 % | HEIGHT: 59 IN | SYSTOLIC BLOOD PRESSURE: 117 MMHG | RESPIRATION RATE: 18 BRPM | HEART RATE: 74 BPM | BODY MASS INDEX: 36.62 KG/M2 | WEIGHT: 181.63 LBS | TEMPERATURE: 98 F | DIASTOLIC BLOOD PRESSURE: 71 MMHG

## 2021-12-03 DIAGNOSIS — N39.0 CHRONIC UTI: ICD-10-CM

## 2021-12-03 DIAGNOSIS — M54.9 BACK PAIN, UNSPECIFIED BACK LOCATION, UNSPECIFIED BACK PAIN LATERALITY, UNSPECIFIED CHRONICITY: Primary | ICD-10-CM

## 2021-12-03 LAB
BILIRUB SERPL-MCNC: NEGATIVE MG/DL
BILIRUB UR QL STRIP: NEGATIVE
BLOOD URINE, POC: NEGATIVE
CLARITY UR: CLEAR
COLOR UR: YELLOW
COLOR, POC UA: YELLOW
GLUCOSE UR QL STRIP: NEGATIVE
GLUCOSE UR STRIP-MCNC: NEGATIVE MG/DL
KETONES UR QL STRIP: NEGATIVE
KETONES UR STRIP-SCNC: NEGATIVE MG/DL
LEUKOCYTE ESTERASE UR QL STRIP: NEGATIVE
LEUKOCYTE ESTERASE URINE, POC: NEGATIVE
NITRITE UR QL STRIP: NEGATIVE
NITRITE, POC UA: NEGATIVE
PH UR STRIP: 5 PH UNITS
PH, POC UA: 5
PROT UR QL STRIP: NEGATIVE
PROTEIN, POC: NEGATIVE
RBC # UR STRIP: NEGATIVE /UL
SP GR UR STRIP: >=1.03
SPECIFIC GRAVITY, POC UA: 1.03
UROBILINOGEN UR STRIP-ACNC: 0.2 MG/DL
UROBILINOGEN, POC UA: 0.2

## 2021-12-03 PROCEDURE — 81003 URINALYSIS, REFLEX TO URINE CULTURE: ICD-10-PCS | Mod: QW,,, | Performed by: CLINICAL MEDICAL LABORATORY

## 2021-12-03 PROCEDURE — 81003 URINALYSIS AUTO W/O SCOPE: CPT | Mod: RHCUB | Performed by: NURSE PRACTITIONER

## 2021-12-03 PROCEDURE — 96372 PR INJECTION,THERAP/PROPH/DIAG2ST, IM OR SUBCUT: ICD-10-PCS | Mod: ,,, | Performed by: NURSE PRACTITIONER

## 2021-12-03 PROCEDURE — 96372 THER/PROPH/DIAG INJ SC/IM: CPT | Mod: ,,, | Performed by: NURSE PRACTITIONER

## 2021-12-03 PROCEDURE — 99213 OFFICE O/P EST LOW 20 MIN: CPT | Mod: 25,,, | Performed by: NURSE PRACTITIONER

## 2021-12-03 PROCEDURE — 81003 URINALYSIS AUTO W/O SCOPE: CPT | Mod: QW,,, | Performed by: CLINICAL MEDICAL LABORATORY

## 2021-12-03 PROCEDURE — 99213 PR OFFICE/OUTPT VISIT, EST, LEVL III, 20-29 MIN: ICD-10-PCS | Mod: 25,,, | Performed by: NURSE PRACTITIONER

## 2021-12-03 RX ORDER — CYANOCOBALAMIN 1000 UG/ML
1000 INJECTION, SOLUTION INTRAMUSCULAR; SUBCUTANEOUS
COMMUNITY
Start: 2021-10-06 | End: 2022-01-31

## 2021-12-03 RX ORDER — KETOROLAC TROMETHAMINE 30 MG/ML
30 INJECTION, SOLUTION INTRAMUSCULAR; INTRAVENOUS
Status: COMPLETED | OUTPATIENT
Start: 2021-12-03 | End: 2021-12-03

## 2021-12-03 RX ORDER — DONEPEZIL HYDROCHLORIDE 5 MG/1
1 TABLET, FILM COATED ORAL DAILY
COMMUNITY
Start: 2021-10-06 | End: 2022-01-14 | Stop reason: SDUPTHER

## 2021-12-03 RX ADMIN — KETOROLAC TROMETHAMINE 30 MG: 30 INJECTION, SOLUTION INTRAMUSCULAR; INTRAVENOUS at 03:12

## 2021-12-17 ENCOUNTER — OFFICE VISIT (OUTPATIENT)
Dept: DERMATOLOGY | Facility: CLINIC | Age: 76
End: 2021-12-17
Payer: MEDICARE

## 2021-12-17 VITALS — WEIGHT: 181 LBS | RESPIRATION RATE: 18 BRPM | HEIGHT: 59 IN | BODY MASS INDEX: 36.49 KG/M2

## 2021-12-17 DIAGNOSIS — L40.9 PSORIASIS: ICD-10-CM

## 2021-12-17 DIAGNOSIS — B00.1 HERPES LABIALIS: ICD-10-CM

## 2021-12-17 DIAGNOSIS — Z79.899 HIGH RISK MEDICATION USE: ICD-10-CM

## 2021-12-17 DIAGNOSIS — Z22.322 MRSA (METHICILLIN RESISTANT STAPH AUREUS) CULTURE POSITIVE: ICD-10-CM

## 2021-12-17 DIAGNOSIS — L40.0 PLAQUE PSORIASIS: Chronic | ICD-10-CM

## 2021-12-17 DIAGNOSIS — K12.30 MUCOSITIS: Primary | ICD-10-CM

## 2021-12-17 PROCEDURE — 87070 CULTURE, WOUND: ICD-10-PCS | Mod: ,,, | Performed by: CLINICAL MEDICAL LABORATORY

## 2021-12-17 PROCEDURE — 87077 CULTURE AEROBIC IDENTIFY: CPT | Mod: ,,, | Performed by: CLINICAL MEDICAL LABORATORY

## 2021-12-17 PROCEDURE — 87186 CULTURE, WOUND: ICD-10-PCS | Mod: ,,, | Performed by: CLINICAL MEDICAL LABORATORY

## 2021-12-17 PROCEDURE — 99214 PR OFFICE/OUTPT VISIT, EST, LEVL IV, 30-39 MIN: ICD-10-PCS | Mod: ,,, | Performed by: STUDENT IN AN ORGANIZED HEALTH CARE EDUCATION/TRAINING PROGRAM

## 2021-12-17 PROCEDURE — 87186 SC STD MICRODIL/AGAR DIL: CPT | Mod: ,,, | Performed by: CLINICAL MEDICAL LABORATORY

## 2021-12-17 PROCEDURE — 99214 OFFICE O/P EST MOD 30 MIN: CPT | Mod: ,,, | Performed by: STUDENT IN AN ORGANIZED HEALTH CARE EDUCATION/TRAINING PROGRAM

## 2021-12-17 PROCEDURE — 87077 CULTURE, WOUND: ICD-10-PCS | Mod: ,,, | Performed by: CLINICAL MEDICAL LABORATORY

## 2021-12-17 PROCEDURE — 87070 CULTURE OTHR SPECIMN AEROBIC: CPT | Mod: ,,, | Performed by: CLINICAL MEDICAL LABORATORY

## 2021-12-18 RX ORDER — ACITRETIN 25 MG/1
CAPSULE ORAL
Qty: 30 CAPSULE | Refills: 2 | Status: SHIPPED | OUTPATIENT
Start: 2021-12-18 | End: 2022-01-25 | Stop reason: SDUPTHER

## 2021-12-20 PROCEDURE — 87529 HSV DNA AMP PROBE: CPT | Mod: 90,,, | Performed by: CLINICAL MEDICAL LABORATORY

## 2021-12-20 PROCEDURE — 87529 MAYO GENERIC ORDERABLE: ICD-10-PCS | Mod: 90,,, | Performed by: CLINICAL MEDICAL LABORATORY

## 2021-12-21 ENCOUNTER — OFFICE VISIT (OUTPATIENT)
Dept: FAMILY MEDICINE | Facility: CLINIC | Age: 76
End: 2021-12-21
Payer: MEDICARE

## 2021-12-21 ENCOUNTER — HOSPITAL ENCOUNTER (OUTPATIENT)
Dept: RADIOLOGY | Facility: HOSPITAL | Age: 76
Discharge: HOME | End: 2021-12-21
Attending: NURSE PRACTITIONER
Payer: MEDICARE

## 2021-12-21 VITALS
SYSTOLIC BLOOD PRESSURE: 119 MMHG | HEIGHT: 59 IN | WEIGHT: 183 LBS | BODY MASS INDEX: 36.89 KG/M2 | OXYGEN SATURATION: 98 % | DIASTOLIC BLOOD PRESSURE: 73 MMHG | TEMPERATURE: 98 F | HEART RATE: 78 BPM | RESPIRATION RATE: 20 BRPM

## 2021-12-21 DIAGNOSIS — R50.9 FEVER, UNSPECIFIED FEVER CAUSE: ICD-10-CM

## 2021-12-21 DIAGNOSIS — R05.9 COUGH: ICD-10-CM

## 2021-12-21 DIAGNOSIS — R06.02 SHORTNESS OF BREATH: ICD-10-CM

## 2021-12-21 DIAGNOSIS — J20.9 ACUTE BRONCHITIS, UNSPECIFIED ORGANISM: Primary | ICD-10-CM

## 2021-12-21 DIAGNOSIS — R52 GENERALIZED BODY ACHES: ICD-10-CM

## 2021-12-21 PROCEDURE — 99213 OFFICE O/P EST LOW 20 MIN: CPT | Mod: 25,,, | Performed by: NURSE PRACTITIONER

## 2021-12-21 PROCEDURE — 96372 PR INJECTION,THERAP/PROPH/DIAG2ST, IM OR SUBCUT: ICD-10-PCS | Mod: ,,, | Performed by: NURSE PRACTITIONER

## 2021-12-21 PROCEDURE — 71046 X-RAY EXAM CHEST 2 VIEWS: CPT | Mod: TC

## 2021-12-21 PROCEDURE — 99213 PR OFFICE/OUTPT VISIT, EST, LEVL III, 20-29 MIN: ICD-10-PCS | Mod: 25,,, | Performed by: NURSE PRACTITIONER

## 2021-12-21 PROCEDURE — 87428 SARSCOV & INF VIR A&B AG IA: CPT | Mod: RHCUB | Performed by: NURSE PRACTITIONER

## 2021-12-21 PROCEDURE — 96372 THER/PROPH/DIAG INJ SC/IM: CPT | Mod: ,,, | Performed by: NURSE PRACTITIONER

## 2021-12-21 RX ORDER — POTASSIUM CHLORIDE 1500 MG/1
TABLET, EXTENDED RELEASE ORAL
COMMUNITY
End: 2022-01-14 | Stop reason: SDUPTHER

## 2021-12-21 RX ORDER — ALBUTEROL SULFATE 90 UG/1
2 AEROSOL, METERED RESPIRATORY (INHALATION) EVERY 6 HOURS PRN
Qty: 18 G | Refills: 0 | Status: SHIPPED | OUTPATIENT
Start: 2021-12-21 | End: 2022-10-31

## 2021-12-21 RX ORDER — METOPROLOL TARTRATE 25 MG/1
TABLET, FILM COATED ORAL
COMMUNITY
End: 2022-01-11

## 2021-12-21 RX ORDER — CALCIUM CARBONATE 600 MG
TABLET ORAL
COMMUNITY
End: 2022-12-27 | Stop reason: SDUPTHER

## 2021-12-21 RX ORDER — AZITHROMYCIN 250 MG/1
TABLET, FILM COATED ORAL
Qty: 6 TABLET | Refills: 0 | Status: SHIPPED | OUTPATIENT
Start: 2021-12-21 | End: 2022-01-11

## 2021-12-21 RX ORDER — ASPIRIN 81 MG/1
TABLET ORAL
COMMUNITY
End: 2022-01-11

## 2021-12-21 RX ORDER — OMEPRAZOLE 40 MG/1
CAPSULE, DELAYED RELEASE ORAL
COMMUNITY
End: 2022-01-11

## 2021-12-21 RX ORDER — ROSUVASTATIN CALCIUM 20 MG/1
TABLET, COATED ORAL
COMMUNITY
End: 2022-01-14 | Stop reason: SDUPTHER

## 2021-12-21 RX ORDER — ACITRETIN 25 MG/1
CAPSULE ORAL
COMMUNITY
End: 2022-01-11

## 2021-12-21 RX ORDER — HYDROCHLOROTHIAZIDE 25 MG/1
TABLET ORAL
COMMUNITY
End: 2022-01-11

## 2021-12-21 RX ORDER — CEFTRIAXONE 1 G/1
1 INJECTION, POWDER, FOR SOLUTION INTRAMUSCULAR; INTRAVENOUS
Status: COMPLETED | OUTPATIENT
Start: 2021-12-21 | End: 2021-12-21

## 2021-12-21 RX ORDER — LATANOPROST 50 UG/ML
SOLUTION/ DROPS OPHTHALMIC
COMMUNITY
End: 2022-01-14 | Stop reason: SDUPTHER

## 2021-12-21 RX ORDER — CYANOCOBALAMIN 1000 UG/ML
INJECTION, SOLUTION INTRAMUSCULAR; SUBCUTANEOUS
COMMUNITY
Start: 2021-10-06 | End: 2022-01-11

## 2021-12-21 RX ADMIN — CEFTRIAXONE 1 G: 1 INJECTION, POWDER, FOR SOLUTION INTRAMUSCULAR; INTRAVENOUS at 03:12

## 2021-12-22 LAB — MICROORGANISM SPEC CULT: ABNORMAL

## 2021-12-22 RX ORDER — DOXYCYCLINE 100 MG/1
100 CAPSULE ORAL EVERY 12 HOURS
Qty: 14 CAPSULE | Refills: 0 | Status: SHIPPED | OUTPATIENT
Start: 2021-12-22 | End: 2022-01-11

## 2021-12-28 ENCOUNTER — OFFICE VISIT (OUTPATIENT)
Dept: FAMILY MEDICINE | Facility: CLINIC | Age: 76
End: 2021-12-28
Payer: MEDICARE

## 2021-12-28 DIAGNOSIS — J40 BRONCHITIS: Primary | ICD-10-CM

## 2021-12-28 DIAGNOSIS — R06.02 SHORTNESS OF BREATH: ICD-10-CM

## 2021-12-28 DIAGNOSIS — R05.9 COUGH: ICD-10-CM

## 2021-12-28 PROCEDURE — 99213 PR OFFICE/OUTPT VISIT, EST, LEVL III, 20-29 MIN: ICD-10-PCS | Mod: ,,, | Performed by: NURSE PRACTITIONER

## 2021-12-28 PROCEDURE — 99213 OFFICE O/P EST LOW 20 MIN: CPT | Mod: ,,, | Performed by: NURSE PRACTITIONER

## 2021-12-28 PROCEDURE — 87428 SARSCOV & INF VIR A&B AG IA: CPT | Mod: RHCUB | Performed by: NURSE PRACTITIONER

## 2021-12-28 RX ORDER — PREDNISONE 10 MG/1
10 TABLET ORAL 2 TIMES DAILY
Qty: 14 TABLET | Refills: 0 | Status: SHIPPED | OUTPATIENT
Start: 2021-12-28 | End: 2022-01-11

## 2021-12-28 RX ORDER — LEVOFLOXACIN 500 MG/1
500 TABLET, FILM COATED ORAL DAILY
Qty: 7 TABLET | Refills: 0 | Status: SHIPPED | OUTPATIENT
Start: 2021-12-28 | End: 2022-01-11

## 2021-12-28 NOTE — PROGRESS NOTES
[facility] - FAMILY MEDICINE  Phone: [phone]       PATIENT NAME: Dayanara Monae   : 1945    AGE: 76 y.o. DATE: 2022    MRN: [id number]        Reason for Visit / Chief Complaint:  Shortness of Breath, Cough, Fatigue, and Generalized Body Aches     Subjective:     HPI:  76 yr old WF presents to the office for cough, fatigue, sob, body aches  Had bronchitis about a week ago patient states   Denies any fever    Review of Systems:    Pertinent items are above noted in HPI.    Review of patient's allergies indicates:   Allergen Reactions    Bactrim [sulfamethoxazole-trimethoprim]         Med List:  Current Outpatient Medications on File Prior to Visit   Medication Sig Dispense Refill    acitretin (SORIATANE) 25 MG capsule Take one 25mg capsule PO daily 30 capsule 2    albuterol (VENTOLIN HFA) 90 mcg/actuation inhaler Inhale 2 puffs into the lungs every 6 (six) hours as needed for Wheezing or Shortness of Breath (cough). 18 g 0    aspirin (ECOTRIN) 81 MG EC tablet Take 81 mg by mouth once daily.      calcium carbonate (OS-FATOU) 600 mg calcium (1,500 mg) Tab 1 tablet      citalopram (CELEXA) 10 MG tablet Take 1 tablet (10 mg total) by mouth once daily. 90 tablet 1    cyanocobalamin 1,000 mcg/mL injection Inject 1,000 mcg into the muscle every 30 days.      donepeziL (ARICEPT) 5 MG tablet Take 1 tablet by mouth once daily.      doxycycline (VIBRAMYCIN) 100 MG Cap Take 1 capsule (100 mg total) by mouth every 12 (twelve) hours. 14 capsule 0    DULoxetine (CYMBALTA) 30 MG capsule TAKE 1 CAPSULE BY MOUTH ONCE DAILY FOR  JOINT  PAIN 90 capsule 0    hydroCHLOROthiazide (HYDRODIURIL) 25 MG tablet Take 1 tablet (25 mg total) by mouth once daily. For BLOOD PRESSURE 90 tablet 1    latanoprost 0.005 % ophthalmic solution INSTILL 1 DROP INTO EACH EYE AT BEDTIME AS DIRECTED      latanoprost 0.005 % ophthalmic solution 1 drop into affected eye in the evening       metoprolol tartrate (LOPRESSOR) 25 MG tablet Take 1 tablet (25 mg total) by mouth 2 (two) times daily. For HEART 180 tablet 1    metoprolol tartrate (LOPRESSOR) 25 MG tablet 1 tablet with food      omeprazole (PRILOSEC) 40 MG capsule Take 1 capsule (40 mg total) by mouth once daily. For STOMACH 90 capsule 1    potassium chloride (K-TAB) 20 mEq 1 tablet with food      topiramate (TOPAMAX) 50 MG tablet TAKE 1 TABLET BY MOUTH TWICE DAILY FOR HEADACHE PREVENTION      acitretin (SORIATANE) 25 MG capsule 1 capsule with a meal      aspirin (ECOTRIN) 81 MG EC tablet 1 tablet      azithromycin (ZITHROMAX Z-GWEN) 250 MG tablet Take 2 tablets on Day 1 and 1 tablet daily for next 4 days. (Patient not taking: Reported on 12/28/2021) 6 tablet 0    butalbital-acetaminophen-caff -40 mg -40 mg Cap       ciprofloxacin HCl (CILOXAN) 0.3 % ophthalmic solution       citalopram (CELEXA) 10 MG tablet       cyanocobalamin 1,000 mcg/mL injection       hydroCHLOROthiazide (HYDRODIURIL) 25 MG tablet       hydroCHLOROthiazide (HYDRODIURIL) 25 MG tablet 1 tablet in the morning      omeprazole (PRILOSEC) 40 MG capsule 1 capsule      potassium chloride SA (K-DUR,KLOR-CON) 20 MEQ tablet Take 1 tablet (20 mEq total) by mouth once daily. (Patient not taking: Reported on 12/28/2021) 90 tablet 1    rosuvastatin (CRESTOR) 20 MG tablet Take 1 tablet (20 mg total) by mouth once daily. For CHOLESTEROL (Patient not taking: Reported on 12/28/2021) 90 tablet 1    rosuvastatin (CRESTOR) 20 MG tablet 1 tablet      valACYclovir (VALTREX) 1000 MG tablet Take 2 tablets (2,000 mg total) by mouth every 12 (twelve) hours. for 1 day 4 tablet 0     No current facility-administered medications on file prior to visit.       Medical/Social/Family History:  Past Medical History:   Diagnosis Date    Actinic keratoses     Atrial fibrillation     CVA (cerebral vascular accident)     Diabetes mellitus     Epilepsy     HTN (hypertension)      Non-melanoma skin cancer     Skin cancer       Social History     Tobacco Use   Smoking Status Never Smoker   Smokeless Tobacco Never Used      Social History     Substance and Sexual Activity   Alcohol Use Never       Family History   Problem Relation Age of Onset    Hypertension Mother       Past Surgical History:   Procedure Laterality Date    APPENDECTOMY      carpel tunnel Right     CHOLECYSTECTOMY      EYE SURGERY      HYSTERECTOMY      JOINT REPLACEMENT          Objective:      There were no vitals filed for this visit.  There is no height or weight on file to calculate BMI.     Physical Exam:    General: well developed, well nourished    Head: normocephalic, atraumatic    Eyes: conjunctivae/corneas clear. PERRL.    Mouth/ENT: ENT exam normal, no neck nodes or sinus tendernessmucous membranes moist, pharynx normal without lesions Uvula is midline.     Neck: supple, symmetrical, trachea midline, no adenopathy and thyroid: not enlarged, symmetric, no tenderness/mass/nodules    Respiratory: unlabored respirations, no intercostal retractions or accessory muscle use, clear to auscultation without rales or wheezes    Cardiovascular: normal rate and regular rhythm, S1 and S2 normal, no murmurs noted    Abdomen: soft, nontender    Musculoskeletal: negative; full range of motion, no tenderness, palpable spasm or pain on motion    Lymphadenopathy: No cervical or supraclavicular adenopathy    Neurological: normal without focal findings and mental status, speech normal, alert and oriented x3    Skin: Skin color, texture, turgor normal. No rashes or lesions    Psych: no auditory or visual hallucinations, no anxiety, no depression/worrying alot       Assessment:          ICD-10-CM ICD-9-CM   1. Bronchitis  J40 490   2. Cough  R05.9 786.2   3. Shortness of breath  R06.02 786.05        Plan:       Bronchitis  -     levoFLOXacin (LEVAQUIN) 500 MG tablet; Take 1 tablet (500 mg total) by mouth once daily.  Dispense: 7  tablet; Refill: 0  -     predniSONE (DELTASONE) 10 MG tablet; Take 1 tablet (10 mg total) by mouth 2 (two) times daily.  Dispense: 14 tablet; Refill: 0  -     pyrilamine-chlophedianoL 12.5-12.5 mg/5 mL Liqd; Take 10 mLs by mouth every 8 (eight) hours as needed (cough).  Dispense: 120 mL; Refill: 0    Cough  -     POCT SARS-COV2 (COVID) with Flu Antigen  -     COVID-19 Routine Screening; Future; Expected date: 12/28/2021    Shortness of breath  -     POCT SARS-COV2 (COVID) with Flu Antigen  -     COVID-19 Routine Screening; Future; Expected date: 12/28/2021        Current Outpatient Medications:     acitretin (SORIATANE) 25 MG capsule, Take one 25mg capsule PO daily, Disp: 30 capsule, Rfl: 2    albuterol (VENTOLIN HFA) 90 mcg/actuation inhaler, Inhale 2 puffs into the lungs every 6 (six) hours as needed for Wheezing or Shortness of Breath (cough)., Disp: 18 g, Rfl: 0    aspirin (ECOTRIN) 81 MG EC tablet, Take 81 mg by mouth once daily., Disp: , Rfl:     calcium carbonate (OS-FATOU) 600 mg calcium (1,500 mg) Tab, 1 tablet, Disp: , Rfl:     citalopram (CELEXA) 10 MG tablet, Take 1 tablet (10 mg total) by mouth once daily., Disp: 90 tablet, Rfl: 1    cyanocobalamin 1,000 mcg/mL injection, Inject 1,000 mcg into the muscle every 30 days., Disp: , Rfl:     donepeziL (ARICEPT) 5 MG tablet, Take 1 tablet by mouth once daily., Disp: , Rfl:     doxycycline (VIBRAMYCIN) 100 MG Cap, Take 1 capsule (100 mg total) by mouth every 12 (twelve) hours., Disp: 14 capsule, Rfl: 0    DULoxetine (CYMBALTA) 30 MG capsule, TAKE 1 CAPSULE BY MOUTH ONCE DAILY FOR  JOINT  PAIN, Disp: 90 capsule, Rfl: 0    hydroCHLOROthiazide (HYDRODIURIL) 25 MG tablet, Take 1 tablet (25 mg total) by mouth once daily. For BLOOD PRESSURE, Disp: 90 tablet, Rfl: 1    latanoprost 0.005 % ophthalmic solution, INSTILL 1 DROP INTO EACH EYE AT BEDTIME AS DIRECTED, Disp: , Rfl:     latanoprost 0.005 % ophthalmic solution, 1 drop into affected eye in the  evening, Disp: , Rfl:     metoprolol tartrate (LOPRESSOR) 25 MG tablet, Take 1 tablet (25 mg total) by mouth 2 (two) times daily. For HEART, Disp: 180 tablet, Rfl: 1    metoprolol tartrate (LOPRESSOR) 25 MG tablet, 1 tablet with food, Disp: , Rfl:     omeprazole (PRILOSEC) 40 MG capsule, Take 1 capsule (40 mg total) by mouth once daily. For STOMACH, Disp: 90 capsule, Rfl: 1    potassium chloride (K-TAB) 20 mEq, 1 tablet with food, Disp: , Rfl:     topiramate (TOPAMAX) 50 MG tablet, TAKE 1 TABLET BY MOUTH TWICE DAILY FOR HEADACHE PREVENTION, Disp: , Rfl:     acitretin (SORIATANE) 25 MG capsule, 1 capsule with a meal, Disp: , Rfl:     aspirin (ECOTRIN) 81 MG EC tablet, 1 tablet, Disp: , Rfl:     azithromycin (ZITHROMAX Z-GWEN) 250 MG tablet, Take 2 tablets on Day 1 and 1 tablet daily for next 4 days. (Patient not taking: Reported on 12/28/2021), Disp: 6 tablet, Rfl: 0    butalbital-acetaminophen-caff -40 mg -40 mg Cap, , Disp: , Rfl:     ciprofloxacin HCl (CILOXAN) 0.3 % ophthalmic solution, , Disp: , Rfl:     citalopram (CELEXA) 10 MG tablet, , Disp: , Rfl:     cyanocobalamin 1,000 mcg/mL injection, , Disp: , Rfl:     hydroCHLOROthiazide (HYDRODIURIL) 25 MG tablet, , Disp: , Rfl:     hydroCHLOROthiazide (HYDRODIURIL) 25 MG tablet, 1 tablet in the morning, Disp: , Rfl:     levoFLOXacin (LEVAQUIN) 500 MG tablet, Take 1 tablet (500 mg total) by mouth once daily., Disp: 7 tablet, Rfl: 0    omeprazole (PRILOSEC) 40 MG capsule, 1 capsule, Disp: , Rfl:     potassium chloride SA (K-DUR,KLOR-CON) 20 MEQ tablet, Take 1 tablet (20 mEq total) by mouth once daily. (Patient not taking: Reported on 12/28/2021), Disp: 90 tablet, Rfl: 1    predniSONE (DELTASONE) 10 MG tablet, Take 1 tablet (10 mg total) by mouth 2 (two) times daily., Disp: 14 tablet, Rfl: 0    pyrilamine-chlophedianoL 12.5-12.5 mg/5 mL Liqd, Take 10 mLs by mouth every 8 (eight) hours as needed (cough)., Disp: 120 mL, Rfl: 0     rosuvastatin (CRESTOR) 20 MG tablet, Take 1 tablet (20 mg total) by mouth once daily. For CHOLESTEROL (Patient not taking: Reported on 12/28/2021), Disp: 90 tablet, Rfl: 1    rosuvastatin (CRESTOR) 20 MG tablet, 1 tablet, Disp: , Rfl:     valACYclovir (VALTREX) 1000 MG tablet, Take 2 tablets (2,000 mg total) by mouth every 12 (twelve) hours. for 1 day, Disp: 4 tablet, Rfl: 0      New & refilled meds:  Requested Prescriptions     Signed Prescriptions Disp Refills    levoFLOXacin (LEVAQUIN) 500 MG tablet 7 tablet 0     Sig: Take 1 tablet (500 mg total) by mouth once daily.    predniSONE (DELTASONE) 10 MG tablet 14 tablet 0     Sig: Take 1 tablet (10 mg total) by mouth 2 (two) times daily.    pyrilamine-chlophedianoL 12.5-12.5 mg/5 mL Liqd 120 mL 0     Sig: Take 10 mLs by mouth every 8 (eight) hours as needed (cough).     Treatment Recommendations:    Orders and follow up as documented in patient record.    Return to clinic as needed.    Signature: YAMILA Webb

## 2021-12-29 LAB — MAYO GENERIC ORDERABLE RESULT: ABNORMAL

## 2021-12-29 PROCEDURE — 87635 SARS-COV-2 COVID-19 AMP PRB: CPT | Mod: CR,,, | Performed by: CLINICAL MEDICAL LABORATORY

## 2021-12-29 PROCEDURE — 87635 SARS-COV-2 (COVID-19) QUALITATIVE PCR: ICD-10-PCS | Mod: CR,,, | Performed by: CLINICAL MEDICAL LABORATORY

## 2021-12-29 RX ORDER — VALACYCLOVIR HYDROCHLORIDE 1 G/1
2000 TABLET, FILM COATED ORAL EVERY 12 HOURS
Qty: 4 TABLET | Refills: 0 | Status: SHIPPED | OUTPATIENT
Start: 2021-12-29 | End: 2022-12-27

## 2021-12-30 LAB — SARS-COV-2 RNA RESP QL NAA+PROBE: NEGATIVE

## 2022-01-11 ENCOUNTER — OFFICE VISIT (OUTPATIENT)
Dept: FAMILY MEDICINE | Facility: CLINIC | Age: 77
End: 2022-01-11
Payer: MEDICARE

## 2022-01-11 ENCOUNTER — TELEPHONE (OUTPATIENT)
Dept: FAMILY MEDICINE | Facility: CLINIC | Age: 77
End: 2022-01-11
Payer: MEDICARE

## 2022-01-11 VITALS
TEMPERATURE: 98 F | DIASTOLIC BLOOD PRESSURE: 67 MMHG | RESPIRATION RATE: 18 BRPM | WEIGHT: 182 LBS | SYSTOLIC BLOOD PRESSURE: 129 MMHG | BODY MASS INDEX: 36.69 KG/M2 | HEIGHT: 59 IN | OXYGEN SATURATION: 96 % | HEART RATE: 73 BPM

## 2022-01-11 DIAGNOSIS — M81.0 AGE-RELATED OSTEOPOROSIS WITHOUT CURRENT PATHOLOGICAL FRACTURE: Primary | Chronic | ICD-10-CM

## 2022-01-11 LAB
25(OH)D3 SERPL-MCNC: 55.2 NG/ML
CALCIUM SERPL-MCNC: 9.6 MG/DL (ref 8.5–10.1)

## 2022-01-11 PROCEDURE — 82306 VITAMIN D 25 HYDROXY: CPT | Mod: ,,, | Performed by: CLINICAL MEDICAL LABORATORY

## 2022-01-11 PROCEDURE — 82310 CALCIUM: ICD-10-PCS | Mod: ,,, | Performed by: CLINICAL MEDICAL LABORATORY

## 2022-01-11 PROCEDURE — 99213 PR OFFICE/OUTPT VISIT, EST, LEVL III, 20-29 MIN: ICD-10-PCS | Mod: ,,, | Performed by: FAMILY MEDICINE

## 2022-01-11 PROCEDURE — 82306 VITAMIN D: ICD-10-PCS | Mod: ,,, | Performed by: CLINICAL MEDICAL LABORATORY

## 2022-01-11 PROCEDURE — 99213 OFFICE O/P EST LOW 20 MIN: CPT | Mod: ,,, | Performed by: FAMILY MEDICINE

## 2022-01-11 PROCEDURE — 82310 ASSAY OF CALCIUM: CPT | Mod: ,,, | Performed by: CLINICAL MEDICAL LABORATORY

## 2022-01-11 NOTE — PATIENT INSTRUCTIONS
Patient Education       Bone Density Testing   The Basics   Written by the doctors and editors at [facility]   What is bone density testing? -- Bone density testing is one way doctors can check how strong your bones are.  As people age, their bones often become less dense. This means they are thinner and more fragile (figure 1). Over time, this can lead to a disorder called osteoporosis, which makes bones weak. People who have osteoporosis have bones that can break too easily. For example, people with osteoporosis sometimes break a bone during a minor fall that normally would not cause serious injury.   What is bone density testing used for? -- Doctors do bone density testing for 2 reasons:  · To see if a person has osteoporosis or is at risk for it  · To see if treatments for osteoporosis are working   Who should get bone density testing? -- All women age 65 and older should have bone density testing at least once. Bone density testing might also be appropriate for some men who are older than 70. Doctors sometimes check bone density in men and women who are younger than this if they are at high risk for breaking a bone. Things that increase your risk of breaking a bone include:  · Having already broken a bone as an adult  · Taking medicines called steroids for a long time  · Weighing less than 127 pounds  · Having rheumatoid arthritis  · Having a parent who broke a hip after a minor injury  Are there different types of bone density tests? -- Yes. The different types of bone density tests include:  · Dual-energy X-ray absorptiometry (DXA) - This is the most useful and reliable bone density test. It is a special kind of X-ray that gives off very little radiation. DXA gives reliable measures of bone density in different parts of the body, such as the spine, hip, and wrist. DXA is the best test to diagnose osteoporosis, predict the chances that you will break a bone, and check how well treatment is  "working.  · Quantitative computerized tomography (CT) - This is another special kind of X-ray. It gives reliable measurements of bone density in the spine. It gives off more radiation than DXA. Doctors don't use this test to diagnose osteoporosis.  · Ultrasound testing - Ultrasound can be used to measure the bone density of the heel. This test can help find out a person's risk of breaking a bone. But doctors don't use this test to diagnose osteoporosis or to follow changes in bone density over time.  What happens during a DXA test? -- During DXA, you lie on a table. Then an X-ray machine scans one of your bones. The test doesn't hurt or make you uncomfortable. You don't need to take any pills or get any shots. The whole thing takes 5 to 10 minutes.  Even though DXA is a type of X-ray, it gives off very little radiation. During a DXA you get about the same amount of radiation that an average person gets from the environment in one day. After the test, your bones get a "score."  What do the results mean? -- The results of a bone density test are called "T" and "Z" scores. For men age 50 and older and for women who have gone through menopause (meaning they no longer have monthly periods), the T-score is more important.  · Normal bone density - People with normal bone density have a T-score between +1 and -1. People who have a score in this range do not usually need treatment. Still, it makes sense for them do things to prevent bone loss. For instance, they should eat foods rich in calcium and vitamin D (figure 2), and get regular exercise.  · Low bone density - This is also known as "osteopenia." It means the bone density is lower than normal, but not as low as it gets with osteoporosis. People with low bone density have a T-score between -1.1 and -2.4. They are at risk for developing osteoporosis. They usually need to do things to prevent bone loss. Sometimes that means taking medicines. They should also eat foods " "with plenty of calcium and vitamin D, and get regular exercise.  · Osteoporosis - People with osteoporosis have a T-score of -2.5 or less. If you discover that you have osteoporosis, there are lots of things you can do to lower the chances that you will break a bone. For instance, you can take osteoporosis medicines and calcium and vitamin D supplements. You can also exercise.  Do I need to have bone density testing again? -- If your test shows that you do not have osteoporosis now, you might need to have the test again later. Ask your doctor or nurse if and when you should be tested again.  All topics are updated as new evidence becomes available and our peer review process is complete.  This topic retrieved from [employer] on: Sep 21, 2021.  Topic 96382 Version 7.0  Release: 29.4.2 - C29.263  © 2021 UpToDate, Inc. and/or its affiliates. All rights reserved.  figure 1: Bones affected by osteoporosis     The bones in this picture are called "vertebrae." The one on the left is healthy. Its spongy center has lots of hard fibers that are connected to each other. The one on the right has osteoporosis. Its spongy center has fewer, thinner fibers that are no longer connected. The surrounding shell of compact bone is thinner. This makes the vertebra weak. That's why it got crushed under the weight it was holding.  Graphic 72178 Version 6.0    figure 2: Foods and drinks with calcium and vitamin D     Foods rich in calcium include ice cream, soy milk, breads, kale, broccoli, milk, cheese, cottage cheese, almonds, yogurt, ready-to-eat cereals, beans, and tofu. Foods rich in vitamin D include milk, fortified plant-based "milks" (soy, almond), canned tuna fish, cod liver oil, yogurt, ready-to-eat-cereals, cooked salmon, canned sardines, mackerel, and eggs. Some of these foods are rich in both.  Graphic 20394 Version 4.0    Consumer Information Use and Disclaimer   This information is not specific medical advice and does not " replace information you receive from your health care provider. This is only a brief summary of general information. It does NOT include all information about conditions, illnesses, injuries, tests, procedures, treatments, therapies, discharge instructions or life-style choices that may apply to you. You must talk with your health care provider for complete information about your health and treatment options. This information should not be used to decide whether or not to accept your health care provider's advice, instructions or recommendations. Only your health care provider has the knowledge and training to provide advice that is right for you. The use of this information is governed by the [employer] End User License Agreement, available at https://www.dakick.[employer]/en/solutions/[employer]/about/lianna.The use of [employer] content is governed by the [employer] Terms of Use. ©2021 UpToDate, Inc. All rights reserved.  Copyright   © 2021 UpToDate, Inc. and/or its affiliates. All rights reserved.

## 2022-01-11 NOTE — TELEPHONE ENCOUNTER
Pt and sister both talking to me on line, stating nothing was eaten prior testing this BS, both concerned about DM as expressed it runs in family. I informed Pt the need to make an appt with Dr Barahona to address this and could have lab to decide DM diagnosis. Pt at sister's home, tells me a new battery placed in monitor due to the 202mg reading, has been testing in past and has not seen any 200's. Again I instructed Pt to make appt and discuss with Dr Barahona.

## 2022-01-11 NOTE — PROGRESS NOTES
Salty Barahona MD    [address] [place] [address]     PATIENT NAME: Dayanara Monae  : 1945  DATE: 22  MRN: [id number]      Billing Provider: Salty Barahona MD  Level of Service:   Patient PCP Information     Provider PCP Type    Salty Barahona MD General          Reason for Visit / Chief Complaint: other (Prolia)       Update PCP  Update Chief Complaint         History of Present Illness / Problem Focused Workflow     Dayanara Monae presents to the clinic with other (Prolia)     20 - DEXA showed osteoporosis        HPI    Review of Systems     Review of Systems   Constitutional: Negative for activity change, appetite change, chills, fatigue and fever.   HENT: Negative for nasal congestion, ear pain, hearing loss, postnasal drip and sore throat.    Respiratory: Negative for cough, chest tightness, shortness of breath and wheezing.    Cardiovascular: Negative for chest pain, palpitations, leg swelling and claudication.   Gastrointestinal: Negative for abdominal pain, change in bowel habit, constipation, diarrhea, nausea, vomiting and change in bowel habit.   Genitourinary: Negative for dysuria.   Musculoskeletal: Negative for arthralgias, back pain, gait problem and myalgias.   Integumentary:  Negative for rash.   Neurological: Negative for weakness and headaches.   Psychiatric/Behavioral: Negative for suicidal ideas. The patient is not nervous/anxious.         Medical / Social / Family History     Past Medical History:   Diagnosis Date    Actinic keratoses     Atrial fibrillation     CVA (cerebral vascular accident)     Diabetes mellitus     Epilepsy     HTN (hypertension)     Non-melanoma skin cancer     Skin cancer        Past Surgical History:   Procedure Laterality Date    APPENDECTOMY      carpel tunnel Right     CHOLECYSTECTOMY      EYE SURGERY      HYSTERECTOMY      JOINT REPLACEMENT         Social History  Ms.   reports that she has never smoked. She has never used smokeless tobacco. She reports that she does not drink alcohol and does not use drugs.    Family History  Ms.'s family history includes Hypertension in her mother.    Medications and Allergies     Medications  Outpatient Medications Marked as Taking for the 1/11/22 encounter (Office Visit) with Salty Barahona MD   Medication Sig Dispense Refill    acitretin (SORIATANE) 25 MG capsule Take one 25mg capsule PO daily 30 capsule 2    albuterol (VENTOLIN HFA) 90 mcg/actuation inhaler Inhale 2 puffs into the lungs every 6 (six) hours as needed for Wheezing or Shortness of Breath (cough). 18 g 0    aspirin (ECOTRIN) 81 MG EC tablet Take 81 mg by mouth once daily.      butalbital-acetaminophen-caff -40 mg -40 mg Cap       calcium carbonate (OS-FATOU) 600 mg calcium (1,500 mg) Tab 1 tablet      citalopram (CELEXA) 10 MG tablet Take 1 tablet (10 mg total) by mouth once daily. 90 tablet 1    cyanocobalamin 1,000 mcg/mL injection Inject 1,000 mcg into the muscle every 30 days.      donepeziL (ARICEPT) 5 MG tablet Take 1 tablet by mouth once daily.      DULoxetine (CYMBALTA) 30 MG capsule TAKE 1 CAPSULE BY MOUTH ONCE DAILY FOR  JOINT  PAIN 90 capsule 0    hydroCHLOROthiazide (HYDRODIURIL) 25 MG tablet Take 1 tablet (25 mg total) by mouth once daily. For BLOOD PRESSURE 90 tablet 1    latanoprost 0.005 % ophthalmic solution INSTILL 1 DROP INTO EACH EYE AT BEDTIME AS DIRECTED      latanoprost 0.005 % ophthalmic solution 1 drop into affected eye in the evening      metoprolol tartrate (LOPRESSOR) 25 MG tablet Take 1 tablet (25 mg total) by mouth 2 (two) times daily. For HEART 180 tablet 1    omeprazole (PRILOSEC) 40 MG capsule Take 1 capsule (40 mg total) by mouth once daily. For STOMACH 90 capsule 1    potassium chloride (K-TAB) 20 mEq 1 tablet with food      rosuvastatin (CRESTOR) 20 MG tablet 1 tablet      topiramate (TOPAMAX) 50 MG tablet TAKE 1  "TABLET BY MOUTH TWICE DAILY FOR HEADACHE PREVENTION         Allergies  Review of patient's allergies indicates:   Allergen Reactions    Bactrim [sulfamethoxazole-trimethoprim]        Physical Examination     Vitals:    01/11/22 0940   BP: 129/67   Pulse: 73   Resp: 18   Temp: 97.6 °F (36.4 °C)     Physical Exam  Vitals and nursing note reviewed.   Constitutional:       General: She is not in acute distress.     Appearance: Normal appearance. She is not ill-appearing.   Eyes:      Extraocular Movements: Extraocular movements intact.      Pupils: Pupils are equal, round, and reactive to light.   Cardiovascular:      Rate and Rhythm: Normal rate and regular rhythm.      Heart sounds: Normal heart sounds.   Pulmonary:      Effort: Pulmonary effort is normal.      Breath sounds: Normal breath sounds.   Abdominal:      General: Bowel sounds are normal.      Palpations: Abdomen is soft.   Musculoskeletal:         General: Normal range of motion.   Skin:     Findings: No rash.   Neurological:      General: No focal deficit present.      Mental Status: She is alert and oriented to person, place, and time. Mental status is at baseline.   Psychiatric:         Mood and Affect: Mood normal.         Behavior: Behavior normal.          Assessment and Plan (including Health Maintenance)      Problem List  Smart Sets  Document Outside HM   :    Plan:   FRAX Score    76 y.o.   female   82.6 kg (182 lb)  4' 11" (1.499 m)  Previous fracture: no  Patient had a fractured hip: no  Tobacco Use: Low Risk     Smoking Tobacco Use: Never Smoker    Smokeless Tobacco Use: Never Used      Glucocorticoid use: no  Do you have Rheumatoid conditions: no  Do you have secondary osteoporosis: no  3 or more alcohol drinks per day: no    Femoral Neck T-score:  -3.0    % 10 year risk of major osteoporotic fracture: 19%  % 10 year risk of hip fracture: 6.8%      Health Maintenance Due   Topic Date Due    TETANUS VACCINE  Never done    Mammogram  " 03/10/2021    COVID-19 Vaccine (3 - Booster) 01/14/2022       Problem List Items Addressed This Visit        Orthopedic    Age-related osteoporosis without current pathological fracture - Primary (Chronic)    Current Assessment & Plan     This visit was a face-to-face for prolia         Relevant Orders    Calcium    Vitamin D        Age-related osteoporosis without current pathological fracture  -     Calcium; Future; Expected date: 01/11/2022  -     Vitamin D; Future; Expected date: 01/11/2022       Health Maintenance Topics with due status: Not Due       Topic Last Completion Date    Colorectal Cancer Screening 02/04/2020    DEXA SCAN 12/09/2020    Lipid Panel 12/17/2021       Procedures     Future Appointments   Date Time Provider Department Center   1/18/2022  9:00 AM [facility]   1/31/2022  3:00 PM AWV NURSE, [facility]   3/24/2022 10:00 AM Rebekah Garcia MD [facility]        Follow up if symptoms worsen or fail to improve.     Signature:  Salty Barahona MD  [address] [place] [address]  Phone #: [phone]  Fax #: [phone]    Date of encounter: 1/11/22    Patient Instructions   Patient Education       Bone Density Testing   The Basics   Written by the doctors and editors at [employer]   What is bone density testing? -- Bone density testing is one way doctors can check how strong your bones are.  As people age, their bones often become less dense. This means they are thinner and more fragile (figure 1). Over time, this can lead to a disorder called osteoporosis, which makes bones weak. People who have osteoporosis have bones that can break too easily. For example, people with osteoporosis sometimes break a bone during a minor fall that normally would not cause serious injury.   What is bone density testing used for? -- Doctors do bone density testing for 2  reasons:  · To see if a person has osteoporosis or is at risk for it  · To see if treatments for osteoporosis are working   Who should get bone density testing? -- All women age 65 and older should have bone density testing at least once. Bone density testing might also be appropriate for some men who are older than 70. Doctors sometimes check bone density in men and women who are younger than this if they are at high risk for breaking a bone. Things that increase your risk of breaking a bone include:  · Having already broken a bone as an adult  · Taking medicines called steroids for a long time  · Weighing less than 127 pounds  · Having rheumatoid arthritis  · Having a parent who broke a hip after a minor injury  Are there different types of bone density tests? -- Yes. The different types of bone density tests include:  · Dual-energy X-ray absorptiometry (DXA) - This is the most useful and reliable bone density test. It is a special kind of X-ray that gives off very little radiation. DXA gives reliable measures of bone density in different parts of the body, such as the spine, hip, and wrist. DXA is the best test to diagnose osteoporosis, predict the chances that you will break a bone, and check how well treatment is working.  · Quantitative computerized tomography (CT) - This is another special kind of X-ray. It gives reliable measurements of bone density in the spine. It gives off more radiation than DXA. Doctors don't use this test to diagnose osteoporosis.  · Ultrasound testing - Ultrasound can be used to measure the bone density of the heel. This test can help find out a person's risk of breaking a bone. But doctors don't use this test to diagnose osteoporosis or to follow changes in bone density over time.  What happens during a DXA test? -- During DXA, you lie on a table. Then an X-ray machine scans one of your bones. The test doesn't hurt or make you uncomfortable. You don't need to take any pills or get  "any shots. The whole thing takes 5 to 10 minutes.  Even though DXA is a type of X-ray, it gives off very little radiation. During a DXA you get about the same amount of radiation that an average person gets from the environment in one day. After the test, your bones get a "score."  What do the results mean? -- The results of a bone density test are called "T" and "Z" scores. For men age 50 and older and for women who have gone through menopause (meaning they no longer have monthly periods), the T-score is more important.  · Normal bone density - People with normal bone density have a T-score between +1 and -1. People who have a score in this range do not usually need treatment. Still, it makes sense for them do things to prevent bone loss. For instance, they should eat foods rich in calcium and vitamin D (figure 2), and get regular exercise.  · Low bone density - This is also known as "osteopenia." It means the bone density is lower than normal, but not as low as it gets with osteoporosis. People with low bone density have a T-score between -1.1 and -2.4. They are at risk for developing osteoporosis. They usually need to do things to prevent bone loss. Sometimes that means taking medicines. They should also eat foods with plenty of calcium and vitamin D, and get regular exercise.  · Osteoporosis - People with osteoporosis have a T-score of -2.5 or less. If you discover that you have osteoporosis, there are lots of things you can do to lower the chances that you will break a bone. For instance, you can take osteoporosis medicines and calcium and vitamin D supplements. You can also exercise.  Do I need to have bone density testing again? -- If your test shows that you do not have osteoporosis now, you might need to have the test again later. Ask your doctor or nurse if and when you should be tested again.  All topics are updated as new evidence becomes available and our peer review process is complete.  This topic " "retrieved from [employer] on: Sep 21, 2021.  Topic 66526 Version 7.0  Release: 29.4.2 - C29.263  © 2021 [employer], Inc[employer] and/or its affiliates. All rights reserved.  figure 1: Bones affected by osteoporosis     The bones in this picture are called "vertebrae." The one on the left is healthy. Its spongy center has lots of hard fibers that are connected to each other. The one on the right has osteoporosis. Its spongy center has fewer, thinner fibers that are no longer connected. The surrounding shell of compact bone is thinner. This makes the vertebra weak. That's why it got crushed under the weight it was holding.  Graphic 59180 Version 6.0    figure 2: Foods and drinks with calcium and vitamin D     Foods rich in calcium include ice cream, soy milk, breads, kale, broccoli, milk, cheese, cottage cheese, almonds, yogurt, ready-to-eat cereals, beans, and tofu. Foods rich in vitamin D include milk, fortified plant-based "milks" (soy, almond), canned tuna fish, cod liver oil, yogurt, ready-to-eat-cereals, cooked salmon, canned sardines, mackerel, and eggs. Some of these foods are rich in both.  Graphic 81109 Version 4.0    Consumer Information Use and Disclaimer   This information is not specific medical advice and does not replace information you receive from your health care provider. This is only a brief summary of general information. It does NOT include all information about conditions, illnesses, injuries, tests, procedures, treatments, therapies, discharge instructions or life-style choices that may apply to you. You must talk with your health care provider for complete information about your health and treatment options. This information should not be used to decide whether or not to accept your health care provider's advice, instructions or recommendations. Only your health care provider has the knowledge and training to provide advice that is right for you. The use of this information is governed by the [employer] " End User License Agreement, available at https://www.schoox.com/en/solutions/lexicomp/about/lianna.The use of [employer] content is governed by the [employer] Terms of Use. ©2021 [employer] All rights reserved.  Copyright   © 2021 [employer] and/or its affiliates. All rights reserved.

## 2022-01-14 DIAGNOSIS — K21.9 GASTROESOPHAGEAL REFLUX DISEASE, UNSPECIFIED WHETHER ESOPHAGITIS PRESENT: Chronic | ICD-10-CM

## 2022-01-14 DIAGNOSIS — I10 ESSENTIAL HYPERTENSION: Chronic | ICD-10-CM

## 2022-01-14 RX ORDER — HYDROCHLOROTHIAZIDE 25 MG/1
25 TABLET ORAL DAILY
Qty: 90 TABLET | Refills: 1 | Status: SHIPPED | OUTPATIENT
Start: 2022-01-14 | End: 2022-01-17 | Stop reason: SDUPTHER

## 2022-01-14 RX ORDER — POTASSIUM CHLORIDE 1500 MG/1
TABLET, EXTENDED RELEASE ORAL
Qty: 90 TABLET | Refills: 1 | Status: SHIPPED | OUTPATIENT
Start: 2022-01-14 | End: 2022-01-17 | Stop reason: SDUPTHER

## 2022-01-14 RX ORDER — DULOXETIN HYDROCHLORIDE 30 MG/1
CAPSULE, DELAYED RELEASE ORAL
Qty: 90 CAPSULE | Refills: 1 | Status: SHIPPED | OUTPATIENT
Start: 2022-01-14 | End: 2022-01-17 | Stop reason: SDUPTHER

## 2022-01-14 RX ORDER — LATANOPROST 50 UG/ML
SOLUTION/ DROPS OPHTHALMIC
Qty: 2.5 ML | Refills: 1 | Status: SHIPPED | OUTPATIENT
Start: 2022-01-14 | End: 2022-12-27 | Stop reason: SDUPTHER

## 2022-01-14 RX ORDER — OMEPRAZOLE 40 MG/1
40 CAPSULE, DELAYED RELEASE ORAL DAILY
Qty: 90 CAPSULE | Refills: 1 | Status: SHIPPED | OUTPATIENT
Start: 2022-01-14 | End: 2022-01-17 | Stop reason: SDUPTHER

## 2022-01-14 RX ORDER — DONEPEZIL HYDROCHLORIDE 5 MG/1
5 TABLET, FILM COATED ORAL DAILY
Qty: 90 TABLET | Refills: 1 | Status: SHIPPED | OUTPATIENT
Start: 2022-01-14 | End: 2022-01-17 | Stop reason: SDUPTHER

## 2022-01-14 RX ORDER — METOPROLOL TARTRATE 25 MG/1
25 TABLET, FILM COATED ORAL 2 TIMES DAILY
Qty: 180 TABLET | Refills: 1 | Status: SHIPPED | OUTPATIENT
Start: 2022-01-14 | End: 2022-01-17 | Stop reason: SDUPTHER

## 2022-01-14 RX ORDER — ROSUVASTATIN CALCIUM 20 MG/1
TABLET, COATED ORAL
Qty: 90 TABLET | Refills: 1 | Status: SHIPPED | OUTPATIENT
Start: 2022-01-14 | End: 2022-01-17 | Stop reason: SDUPTHER

## 2022-01-17 ENCOUNTER — OFFICE VISIT (OUTPATIENT)
Dept: FAMILY MEDICINE | Facility: CLINIC | Age: 77
End: 2022-01-17
Payer: MEDICARE

## 2022-01-17 VITALS
OXYGEN SATURATION: 98 % | HEART RATE: 80 BPM | TEMPERATURE: 98 F | HEIGHT: 59 IN | WEIGHT: 185.38 LBS | BODY MASS INDEX: 37.37 KG/M2 | DIASTOLIC BLOOD PRESSURE: 81 MMHG | RESPIRATION RATE: 18 BRPM | SYSTOLIC BLOOD PRESSURE: 133 MMHG

## 2022-01-17 DIAGNOSIS — K21.9 GASTROESOPHAGEAL REFLUX DISEASE, UNSPECIFIED WHETHER ESOPHAGITIS PRESENT: Chronic | ICD-10-CM

## 2022-01-17 DIAGNOSIS — E11.9 TYPE 2 DIABETES MELLITUS WITHOUT COMPLICATION, WITHOUT LONG-TERM CURRENT USE OF INSULIN: Primary | ICD-10-CM

## 2022-01-17 DIAGNOSIS — I10 ESSENTIAL HYPERTENSION: Chronic | ICD-10-CM

## 2022-01-17 DIAGNOSIS — K21.9 GASTROESOPHAGEAL REFLUX DISEASE WITHOUT ESOPHAGITIS: Chronic | ICD-10-CM

## 2022-01-17 LAB
EST. AVERAGE GLUCOSE BLD GHB EST-MCNC: 210 MG/DL
HBA1C MFR BLD HPLC: 8.9 % (ref 4.5–6.6)

## 2022-01-17 PROCEDURE — 83036 HEMOGLOBIN A1C: ICD-10-PCS | Mod: ,,, | Performed by: CLINICAL MEDICAL LABORATORY

## 2022-01-17 PROCEDURE — 99214 OFFICE O/P EST MOD 30 MIN: CPT | Mod: ,,, | Performed by: FAMILY MEDICINE

## 2022-01-17 PROCEDURE — 83036 HEMOGLOBIN GLYCOSYLATED A1C: CPT | Mod: ,,, | Performed by: CLINICAL MEDICAL LABORATORY

## 2022-01-17 PROCEDURE — 99214 PR OFFICE/OUTPT VISIT, EST, LEVL IV, 30-39 MIN: ICD-10-PCS | Mod: ,,, | Performed by: FAMILY MEDICINE

## 2022-01-17 RX ORDER — DULOXETIN HYDROCHLORIDE 30 MG/1
CAPSULE, DELAYED RELEASE ORAL
Qty: 90 CAPSULE | Refills: 1 | Status: SHIPPED | OUTPATIENT
Start: 2022-01-17 | End: 2022-07-18 | Stop reason: SDUPTHER

## 2022-01-17 RX ORDER — OMEPRAZOLE 40 MG/1
40 CAPSULE, DELAYED RELEASE ORAL DAILY
Qty: 90 CAPSULE | Refills: 1 | Status: SHIPPED | OUTPATIENT
Start: 2022-01-17 | End: 2022-07-18 | Stop reason: SDUPTHER

## 2022-01-17 RX ORDER — HYDROCHLOROTHIAZIDE 25 MG/1
25 TABLET ORAL DAILY
Qty: 90 TABLET | Refills: 1 | Status: SHIPPED | OUTPATIENT
Start: 2022-01-17 | End: 2022-07-18 | Stop reason: SDUPTHER

## 2022-01-17 RX ORDER — METOPROLOL TARTRATE 25 MG/1
25 TABLET, FILM COATED ORAL 2 TIMES DAILY
Qty: 180 TABLET | Refills: 1 | Status: SHIPPED | OUTPATIENT
Start: 2022-01-17 | End: 2022-07-18 | Stop reason: SDUPTHER

## 2022-01-17 RX ORDER — DONEPEZIL HYDROCHLORIDE 5 MG/1
5 TABLET, FILM COATED ORAL DAILY
Qty: 90 TABLET | Refills: 1 | Status: SHIPPED | OUTPATIENT
Start: 2022-01-17 | End: 2022-07-18 | Stop reason: SDUPTHER

## 2022-01-17 RX ORDER — POTASSIUM CHLORIDE 1500 MG/1
TABLET, EXTENDED RELEASE ORAL
Qty: 90 TABLET | Refills: 1 | Status: SHIPPED | OUTPATIENT
Start: 2022-01-17 | End: 2022-07-18 | Stop reason: SDUPTHER

## 2022-01-17 RX ORDER — ROSUVASTATIN CALCIUM 20 MG/1
TABLET, COATED ORAL
Qty: 90 TABLET | Refills: 1 | Status: SHIPPED | OUTPATIENT
Start: 2022-01-17 | End: 2022-07-18 | Stop reason: SDUPTHER

## 2022-01-17 NOTE — PROGRESS NOTES
Salty Barahona MD    [address] [place] [address]     PATIENT NAME: Dayanara Monae  : 1945  DATE: 22  MRN: [id number]      Billing Provider: Salty Barahona MD  Level of Service:   Patient PCP Information     Provider PCP Type    Salty Barahona MD General          Reason for Visit / Chief Complaint: Follow-up (Pt. Presents to the clinic for an evaluation of elevated blood sugar. States she was on prednisone for bronchitis and believes this is the cause of her elevated sugars. States her numbers have been around 150. )       Update PCP  Update Chief Complaint         History of Present Illness / Problem Focused Workflow     Dayanara Monae presents to the clinic with Follow-up (Pt. Presents to the clinic for an evaluation of elevated blood sugar. States she was on prednisone for bronchitis and believes this is the cause of her elevated sugars. States her numbers have been around 150. )     HPI    Review of Systems     Review of Systems   Constitutional: Negative for activity change, appetite change, chills, fatigue and fever.   HENT: Negative for nasal congestion, ear pain, hearing loss, postnasal drip and sore throat.    Respiratory: Negative for cough, chest tightness, shortness of breath and wheezing.    Cardiovascular: Negative for chest pain, palpitations, leg swelling and claudication.   Gastrointestinal: Negative for abdominal pain, change in bowel habit, constipation, diarrhea, nausea, vomiting and change in bowel habit.   Genitourinary: Negative for dysuria.   Musculoskeletal: Negative for arthralgias, back pain, gait problem and myalgias.   Integumentary:  Negative for rash.   Neurological: Negative for weakness and headaches.   Psychiatric/Behavioral: Negative for suicidal ideas. The patient is not nervous/anxious.         Medical / Social / Family History     Past Medical History:   Diagnosis Date    Actinic keratoses      Atrial fibrillation     CVA (cerebral vascular accident)     Diabetes mellitus     Epilepsy     HTN (hypertension)     Non-melanoma skin cancer     Skin cancer        Past Surgical History:   Procedure Laterality Date    APPENDECTOMY      carpel tunnel Right     CHOLECYSTECTOMY      EYE SURGERY      HYSTERECTOMY      JOINT REPLACEMENT         Social History  Ms.  reports that she has never smoked. She has never used smokeless tobacco. She reports that she does not drink alcohol and does not use drugs.    Family History  Ms.'s family history includes Hypertension in her mother.    Medications and Allergies     Medications  No outpatient medications have been marked as taking for the 1/17/22 encounter (Office Visit) with Salty Barahona MD.       Allergies  Review of patient's allergies indicates:   Allergen Reactions    Vaseline carbolated petroleum Blisters    Bactrim [sulfamethoxazole-trimethoprim]        Physical Examination     Vitals:    01/17/22 1316   BP: 133/81   Pulse: 80   Resp: 18   Temp: 97.6 °F (36.4 °C)     Physical Exam  Vitals and nursing note reviewed.   Constitutional:       General: She is not in acute distress.     Appearance: Normal appearance. She is not ill-appearing.   Eyes:      Extraocular Movements: Extraocular movements intact.      Pupils: Pupils are equal, round, and reactive to light.   Cardiovascular:      Rate and Rhythm: Normal rate and regular rhythm.      Heart sounds: Normal heart sounds.   Pulmonary:      Effort: Pulmonary effort is normal.      Breath sounds: Normal breath sounds.   Abdominal:      General: Bowel sounds are normal.      Palpations: Abdomen is soft.   Musculoskeletal:         General: Normal range of motion.   Skin:     Findings: No rash.   Neurological:      General: No focal deficit present.      Mental Status: She is alert and oriented to person, place, and time. Mental status is at baseline.   Psychiatric:         Mood and Affect: Mood  normal.         Behavior: Behavior normal.          Assessment and Plan (including Health Maintenance)      Problem List  Smart Sets  Document Outside HM   :    Plan:         Health Maintenance Due   Topic Date Due    TETANUS VACCINE  Never done    Mammogram  03/10/2021    COVID-19 Vaccine (3 - Booster) 01/14/2022       Problem List Items Addressed This Visit        Cardiac/Vascular    Essential hypertension (Chronic)    Current Assessment & Plan      - Goal blood pressure for most patients is less than 130/85. Both numbers are important. If you have questions about your specific goal blood pressure, please ask at your clinic visits.   - Check blood pressure outside of clinic, record the numbers, and bring the log to all your office visits.   - Take a daily, 81mg Aspirin, unless instructed to take a different dose for another medical purpose. Also do not take Aspirin if you have a history of adverse reaction to Aspirin.   - If you have any chest pain, SOB, or other concerning symptoms, report these immediately, or go to the nearest ER.    - Recommend cardiovascular exercise, at least 3 times per week, for at least 15 minutes.              Endocrine    Type 2 diabetes mellitus without complication, without long-term current use of insulin - Primary (Chronic)    Current Assessment & Plan      - Check glucose outside of clinic, record numbers, and bring log to follow up visit.    - Take medications as directed, and bring all medications to every clinic appointment.    - Eat a diabetic diet, if there are concerns about what that entails, we have a diabetic educator in our clinic.    - Cardiovascular exercise at least 3 times per week, for at least 15 minutes.    - Patient should be on a Statin medication, unless patient cannot tolerate a Statin.    - Aspirin should be taken everyday,  81mg, unless a higher dose is indicated for other medicaiton conditions. Also do not recommend Aspirin for a patient with known  adverse effects from Aspirin.    - Recommend yearly, dilated eye exams for all Diabetic Patients.    - Monitor feet for calluses, abrasion, or other abnormalities. Report any concerns at every clinic visit.    -   Hemoglobin A1C   Date Value Ref Range Status   01/17/2022 8.9 (H) 4.5 - 6.6 % Final     Comment:       Normal:               <5.7%  Pre-Diabetic:       5.7% to 6.4%  Diabetic:             >6.4%  Diabetic Goal:     <7%               Relevant Orders    Hemoglobin A1C (Completed)       GI    Gastroesophageal reflux disease (Chronic)        Type 2 diabetes mellitus without complication, without long-term current use of insulin  -     Hemoglobin A1C; Future; Expected date: 01/17/2022    Essential hypertension    Gastroesophageal reflux disease without esophagitis       Health Maintenance Topics with due status: Not Due       Topic Last Completion Date    Colorectal Cancer Screening 02/04/2020    DEXA SCAN 12/09/2020    Lipid Panel 12/17/2021       Procedures     Future Appointments   Date Time Provider Department Center   1/31/2022  3:00 PM AWV NURSE, [facility] FAMILY MEDICINE [facility] [place]   3/24/2022 10:00 AM Rebekah Garcia MD [facility]   7/21/2022  9:00 AM INFUSION, [facility] INFUSN [place]        Follow up in about 6 months (around 7/17/2022), or if symptoms worsen or fail to improve, for chronic health problems.     Signature:  Salty Barahona MD  [address] [place] [address]  Phone #: [phone]  Fax #: [phone]    Date of encounter: 1/17/22    Patient Instructions   Patient Education       Treatment for Type 2 Diabetes   The Basics   Written by the doctors and editors at [facility]   What are the goals of type 2 diabetes treatment? -- The goals of treatment for type 2 diabetes are:  · To keep your blood sugar under control   · To prevent future health problems that can happen in people with diabetes  How is type 2  "diabetes treated? -- Type 2 diabetes can be treated with:  · Diet changes  · Lifestyle changes  · Medicines  Your doctor or nurse will work with you to make a treatment plan that is right for you.  What diet and lifestyle changes might be part of my treatment? -- As part of your treatment, your doctor or nurse might recommend that you:  · Eat healthy foods  · Lose weight if you are overweight  · Get plenty of physical activity  · Not smoke  Making these lifestyle changes is as important as taking your medicines. They will also help improve your overall health.  What medicines are used to treat type 2 diabetes? -- Different medicines can be used to treat type 2 diabetes. The first medicine that most people with type 2 diabetes take is a pill called metformin (brand name: Glucophage).  How do I know if my treatment is working? -- Your doctor can do a blood test called an "A1C." This test shows what your blood sugar level has been over the past 2 to 3 months.  Another way to know if your treatment is working is to check your blood sugar level yourself. Many people with type 2 diabetes do not need to do this, but some do. It involves using a device called a "blood glucose monitor." If your doctor recommends doing this, they will explain how and when to use the device.  What if my blood sugar level is still higher than normal? -- If your blood sugar level is still higher than normal after taking metformin for 2 to 3 months, your doctor might increase your dose. If you are already taking the highest possible dose, your doctor might suggest adding a second medicine.  Which second medicine will I take? -- There are different medicines your doctor can prescribe. The second medicine could be another pill you take every day, or a shot you give yourself once a day or once a week. The choice will depend on different things, including how high your blood sugar is, your weight, your other health problems, and whether you are " comfortable giving yourself a shot.   A few of these medicines can cause low blood sugar as a side effect. Symptoms of low blood sugar can include:  · Sweating and shaking  · Feeling hungry  · Feeling worried  Low blood sugar should be treated quickly because it can cause you to pass out. Your doctor or nurse will tell you ahead of time if your medicine can cause low blood sugar and how to treat it.  What is insulin? -- Insulin is a hormone that is normally made by the pancreas, an organ in the belly. It helps sugar get into your body's cells. In most people with type 2 diabetes, the body does not respond to insulin normally. Then, over time, the pancreas stops making enough insulin.  Most people with type 2 diabetes can control their blood sugar with healthy eating, physical activity, and medicines. Some people need to take insulin as part of their treatment plan.   Insulin might be prescribed as a second medicine, or as the only medicine. It usually comes in the form of a shot that people give themselves (figure 1).   If your doctor prescribes insulin, they will tell you:  · Which type of insulin to use - There are different types of insulin. Some types work faster or last longer than others.  · How much insulin to use  · When to use it  · How to give yourself the shot  · When to check your blood sugar level  · How to avoid low blood sugar  If you take insulin, your dose will need to change if you get sick, have surgery, travel, or eat out. Your doctor or nurse will talk to you about when and how to change your dose.  What other treatments might I need? -- Sometimes, people with type 2 diabetes need medicines to treat or prevent other health problems. For example, if you have high blood pressure, you might take medicine to lower your blood pressure. This can reduce your chances of having a heart attack or stroke.  When should I see my doctor or nurse? -- Most people with diabetes see their doctor or nurse every 3  or 4 months. During these visits, they will talk with you about your medicines and blood sugar levels. If your blood sugar levels are not where they should be, your doctor or nurse might make changes to your treatment plan.  Taking care of diabetes can be hard, and some people feel sad, stressed, or anxious. Let your doctor or nurse know if you are struggling so they can help.  All topics are updated as new evidence becomes available and our peer review process is complete.  This topic retrieved from [employer] on: Sep 21, 2021.  Topic 61752 Version 8.0  Release: 29.4.2 - C29.263  © 2021 UpToDate, Inc. and/or its affiliates. All rights reserved.  figure 1: How to give an insulin shot     You can give yourself insulin using a needle and syringe (panel A) or a pen injector (panel B):  1. Pinch up some skin and quickly insert the needle. Keep the skin pinched to avoid having the insulin go into the muscle. Depending upon your body type, you may not need to pinch up a fold of skin.  2. Push the plunger down all the way and count to 5.   3. Let go of the skin and remove the needle. If you can see blood or clear fluid (insulin) where the shot went in, press on the area for 5 to 8 seconds, but do not rub.  Graphic 71163 Version 11.0    Consumer Information Use and Disclaimer   This information is not specific medical advice and does not replace information you receive from your health care provider. This is only a brief summary of general information. It does NOT include all information about conditions, illnesses, injuries, tests, procedures, treatments, therapies, discharge instructions or life-style choices that may apply to you. You must talk with your health care provider for complete information about your health and treatment options. This information should not be used to decide whether or not to accept your health care provider's advice, instructions or recommendations. Only your health care provider has the  knowledge and training to provide advice that is right for you. The use of this information is governed by the [employer] End User License Agreement, available at https://www.Crossbeam Systems.Yakimbi/en/solutions/[employer]/about/lianna.The use of [employer] content is governed by the [employer] Terms of Use. ©2021 UpToDate, Inc. All rights reserved.  Copyright   © 2021 UpToDate, Inc. and/or its affiliates. All rights reserved.

## 2022-01-18 ENCOUNTER — TELEPHONE (OUTPATIENT)
Dept: FAMILY MEDICINE | Facility: CLINIC | Age: 77
End: 2022-01-18
Payer: MEDICARE

## 2022-01-18 ENCOUNTER — INFUSION (OUTPATIENT)
Dept: INFUSION THERAPY | Facility: HOSPITAL | Age: 77
End: 2022-01-18
Attending: FAMILY MEDICINE
Payer: MEDICARE

## 2022-01-18 VITALS
HEIGHT: 59 IN | OXYGEN SATURATION: 96 % | SYSTOLIC BLOOD PRESSURE: 149 MMHG | WEIGHT: 186.19 LBS | RESPIRATION RATE: 20 BRPM | HEART RATE: 66 BPM | DIASTOLIC BLOOD PRESSURE: 64 MMHG | BODY MASS INDEX: 37.53 KG/M2 | TEMPERATURE: 97 F

## 2022-01-18 DIAGNOSIS — M81.0 AGE-RELATED OSTEOPOROSIS WITHOUT CURRENT PATHOLOGICAL FRACTURE: Primary | ICD-10-CM

## 2022-01-18 PROCEDURE — 63600175 PHARM REV CODE 636 W HCPCS: Performed by: FAMILY MEDICINE

## 2022-01-18 PROCEDURE — 96372 THER/PROPH/DIAG INJ SC/IM: CPT

## 2022-01-18 RX ADMIN — DENOSUMAB 60 MG: 60 INJECTION SUBCUTANEOUS at 08:01

## 2022-01-18 NOTE — PROGRESS NOTES
0830  Patient in room.  No distress noted at this time.  Vital signs within normal limits. Meds and history reviewed with patient.  Patient taking calcium with vitamin d. Calcium level is 9.6 on 1-11-22    0842  Administered prolia 60mg SQ to left upper arm per protocol.  Patient tolerated well.      0902  Patient had no adverse reactions noted.  Patient discharged to home ambulatory with appointment in hand.

## 2022-01-18 NOTE — TELEPHONE ENCOUNTER
I explained we could attempt to make BS better with some modification, if we could review what we have discussed long ago. Pt has had 6.8% Hgba1c in past, not on meds. Family Hx of DM. I offered any Monday-Thursday time to talk, just think about it and call for appt when ready to talk.

## 2022-01-24 ENCOUNTER — TELEPHONE (OUTPATIENT)
Dept: FAMILY MEDICINE | Facility: CLINIC | Age: 77
End: 2022-01-24
Payer: MEDICARE

## 2022-01-24 NOTE — TELEPHONE ENCOUNTER
Pt states wanting to know what to eat. I explained slowly over the phone the continuous use of the DM sheet I gave, to practice portion control with the noted servings. Pt states seeing the 1/2cup on the starchy veggies, Yes that is each serving I explained and I encouraged lots of the non-starchy veggies which don't count and assist with fullness at meals.   Pt states not able to walk as did years ago when first found out having DM. States knees won't last long while walking. I just encouraged sit & be fit type exercises,slowly increasing the number of reps, arms/legs movements while sitting or standing by a chair.  I asked Pt to check BS fasting and some ac supper checks, can come in as offered Monday-Thurs, just call before coming to make sure I am in clinic.  Pt states will call me next Monday with BS readings.

## 2022-01-25 DIAGNOSIS — L40.0 PLAQUE PSORIASIS: Chronic | ICD-10-CM

## 2022-01-25 PROBLEM — E11.9 TYPE 2 DIABETES MELLITUS WITHOUT COMPLICATION, WITHOUT LONG-TERM CURRENT USE OF INSULIN: Status: ACTIVE | Noted: 2022-01-25

## 2022-01-25 PROBLEM — E11.9 TYPE 2 DIABETES MELLITUS WITHOUT COMPLICATION, WITHOUT LONG-TERM CURRENT USE OF INSULIN: Chronic | Status: ACTIVE | Noted: 2022-01-25

## 2022-01-25 RX ORDER — ACITRETIN 25 MG/1
CAPSULE ORAL
Qty: 30 CAPSULE | Refills: 2 | Status: SHIPPED | OUTPATIENT
Start: 2022-01-25 | End: 2022-03-24 | Stop reason: SDUPTHER

## 2022-01-25 NOTE — PATIENT INSTRUCTIONS
"Patient Education       Treatment for Type 2 Diabetes   The Basics   Written by the doctors and editors at [facility]   What are the goals of type 2 diabetes treatment? -- The goals of treatment for type 2 diabetes are:  · To keep your blood sugar under control   · To prevent future health problems that can happen in people with diabetes  How is type 2 diabetes treated? -- Type 2 diabetes can be treated with:  · Diet changes  · Lifestyle changes  · Medicines  Your doctor or nurse will work with you to make a treatment plan that is right for you.  What diet and lifestyle changes might be part of my treatment? -- As part of your treatment, your doctor or nurse might recommend that you:  · Eat healthy foods  · Lose weight if you are overweight  · Get plenty of physical activity  · Not smoke  Making these lifestyle changes is as important as taking your medicines. They will also help improve your overall health.  What medicines are used to treat type 2 diabetes? -- Different medicines can be used to treat type 2 diabetes. The first medicine that most people with type 2 diabetes take is a pill called metformin (brand name: Glucophage).  How do I know if my treatment is working? -- Your doctor can do a blood test called an "A1C." This test shows what your blood sugar level has been over the past 2 to 3 months.  Another way to know if your treatment is working is to check your blood sugar level yourself. Many people with type 2 diabetes do not need to do this, but some do. It involves using a device called a "blood glucose monitor." If your doctor recommends doing this, they will explain how and when to use the device.  What if my blood sugar level is still higher than normal? -- If your blood sugar level is still higher than normal after taking metformin for 2 to 3 months, your doctor might increase your dose. If you are already taking the highest possible dose, your doctor might suggest adding a second medicine.  Which " second medicine will I take? -- There are different medicines your doctor can prescribe. The second medicine could be another pill you take every day, or a shot you give yourself once a day or once a week. The choice will depend on different things, including how high your blood sugar is, your weight, your other health problems, and whether you are comfortable giving yourself a shot.   A few of these medicines can cause low blood sugar as a side effect. Symptoms of low blood sugar can include:  · Sweating and shaking  · Feeling hungry  · Feeling worried  Low blood sugar should be treated quickly because it can cause you to pass out. Your doctor or nurse will tell you ahead of time if your medicine can cause low blood sugar and how to treat it.  What is insulin? -- Insulin is a hormone that is normally made by the pancreas, an organ in the belly. It helps sugar get into your body's cells. In most people with type 2 diabetes, the body does not respond to insulin normally. Then, over time, the pancreas stops making enough insulin.  Most people with type 2 diabetes can control their blood sugar with healthy eating, physical activity, and medicines. Some people need to take insulin as part of their treatment plan.   Insulin might be prescribed as a second medicine, or as the only medicine. It usually comes in the form of a shot that people give themselves (figure 1).   If your doctor prescribes insulin, they will tell you:  · Which type of insulin to use - There are different types of insulin. Some types work faster or last longer than others.  · How much insulin to use  · When to use it  · How to give yourself the shot  · When to check your blood sugar level  · How to avoid low blood sugar  If you take insulin, your dose will need to change if you get sick, have surgery, travel, or eat out. Your doctor or nurse will talk to you about when and how to change your dose.  What other treatments might I need? -- Sometimes,  people with type 2 diabetes need medicines to treat or prevent other health problems. For example, if you have high blood pressure, you might take medicine to lower your blood pressure. This can reduce your chances of having a heart attack or stroke.  When should I see my doctor or nurse? -- Most people with diabetes see their doctor or nurse every 3 or 4 months. During these visits, they will talk with you about your medicines and blood sugar levels. If your blood sugar levels are not where they should be, your doctor or nurse might make changes to your treatment plan.  Taking care of diabetes can be hard, and some people feel sad, stressed, or anxious. Let your doctor or nurse know if you are struggling so they can help.  All topics are updated as new evidence becomes available and our peer review process is complete.  This topic retrieved from [employer] on: Sep 21, 2021.  Topic 53303 Version 8.0  Release: 29.4.2 - C29.263  © 2021 UpToDate, Inc. and/or its affiliates. All rights reserved.  figure 1: How to give an insulin shot     You can give yourself insulin using a needle and syringe (panel A) or a pen injector (panel B):  1. Pinch up some skin and quickly insert the needle. Keep the skin pinched to avoid having the insulin go into the muscle. Depending upon your body type, you may not need to pinch up a fold of skin.  2. Push the plunger down all the way and count to 5.   3. Let go of the skin and remove the needle. If you can see blood or clear fluid (insulin) where the shot went in, press on the area for 5 to 8 seconds, but do not rub.  Graphic 06336 Version 11.0    Consumer Information Use and Disclaimer   This information is not specific medical advice and does not replace information you receive from your health care provider. This is only a brief summary of general information. It does NOT include all information about conditions, illnesses, injuries, tests, procedures, treatments, therapies, discharge  instructions or life-style choices that may apply to you. You must talk with your health care provider for complete information about your health and treatment options. This information should not be used to decide whether or not to accept your health care provider's advice, instructions or recommendations. Only your health care provider has the knowledge and training to provide advice that is right for you. The use of this information is governed by the [employer] End User License Agreement, available at https://www.PurePlay/en/solutions/[employer]/about/lianna.The use of [employer] content is governed by the [employer] Terms of Use. ©2021 [employer]. All rights reserved.  Copyright   © 2021 UpToDate, Inc. and/or its affiliates. All rights reserved.

## 2022-01-25 NOTE — ASSESSMENT & PLAN NOTE
- Check glucose outside of clinic, record numbers, and bring log to follow up visit.    - Take medications as directed, and bring all medications to every clinic appointment.    - Eat a diabetic diet, if there are concerns about what that entails, we have a diabetic educator in our clinic.    - Cardiovascular exercise at least 3 times per week, for at least 15 minutes.    - Patient should be on a Statin medication, unless patient cannot tolerate a Statin.    - Aspirin should be taken everyday,  81mg, unless a higher dose is indicated for other medicaiton conditions. Also do not recommend Aspirin for a patient with known adverse effects from Aspirin.    - Recommend yearly, dilated eye exams for all Diabetic Patients.    - Monitor feet for calluses, abrasion, or other abnormalities. Report any concerns at every clinic visit.    -   Hemoglobin A1C   Date Value Ref Range Status   01/17/2022 8.9 (H) 4.5 - 6.6 % Final     Comment:       Normal:               <5.7%  Pre-Diabetic:       5.7% to 6.4%  Diabetic:             >6.4%  Diabetic Goal:     <7%

## 2022-01-31 ENCOUNTER — OFFICE VISIT (OUTPATIENT)
Dept: FAMILY MEDICINE | Facility: CLINIC | Age: 77
End: 2022-01-31
Payer: MEDICARE

## 2022-01-31 VITALS
DIASTOLIC BLOOD PRESSURE: 67 MMHG | TEMPERATURE: 98 F | WEIGHT: 183.19 LBS | HEART RATE: 71 BPM | HEIGHT: 59 IN | SYSTOLIC BLOOD PRESSURE: 127 MMHG | OXYGEN SATURATION: 97 % | RESPIRATION RATE: 18 BRPM | BODY MASS INDEX: 36.93 KG/M2

## 2022-01-31 DIAGNOSIS — Z78.0 ASYMPTOMATIC MENOPAUSE: Primary | ICD-10-CM

## 2022-01-31 DIAGNOSIS — E78.2 MIXED HYPERLIPIDEMIA: Chronic | ICD-10-CM

## 2022-01-31 DIAGNOSIS — Z12.31 ENCOUNTER FOR SCREENING MAMMOGRAM FOR MALIGNANT NEOPLASM OF BREAST: ICD-10-CM

## 2022-01-31 DIAGNOSIS — Z23 ENCOUNTER FOR IMMUNIZATION: ICD-10-CM

## 2022-01-31 DIAGNOSIS — E11.9 TYPE 2 DIABETES MELLITUS WITHOUT COMPLICATION, WITHOUT LONG-TERM CURRENT USE OF INSULIN: Chronic | ICD-10-CM

## 2022-01-31 DIAGNOSIS — Z12.39 ENCOUNTER FOR SCREENING FOR MALIGNANT NEOPLASM OF BREAST, UNSPECIFIED SCREENING MODALITY: ICD-10-CM

## 2022-01-31 DIAGNOSIS — I10 ESSENTIAL HYPERTENSION: Chronic | ICD-10-CM

## 2022-01-31 PROCEDURE — G0439 PR MEDICARE ANNUAL WELLNESS SUBSEQUENT VISIT: ICD-10-PCS | Mod: ,,, | Performed by: FAMILY MEDICINE

## 2022-01-31 PROCEDURE — 90662 IIV NO PRSV INCREASED AG IM: CPT | Mod: ,,, | Performed by: FAMILY MEDICINE

## 2022-01-31 PROCEDURE — 90662 FLU VACCINE - QUADRIVALENT - HIGH DOSE (65+) PRESERVATIVE FREE IM: ICD-10-PCS | Mod: ,,, | Performed by: FAMILY MEDICINE

## 2022-01-31 PROCEDURE — G0008 FLU VACCINE - QUADRIVALENT - HIGH DOSE (65+) PRESERVATIVE FREE IM: ICD-10-PCS | Mod: ,,, | Performed by: FAMILY MEDICINE

## 2022-01-31 PROCEDURE — G0008 ADMIN INFLUENZA VIRUS VAC: HCPCS | Mod: ,,, | Performed by: FAMILY MEDICINE

## 2022-01-31 PROCEDURE — G0439 PPPS, SUBSEQ VISIT: HCPCS | Mod: ,,, | Performed by: FAMILY MEDICINE

## 2022-01-31 NOTE — PROGRESS NOTES
[facility]   [facility]      PATIENT NAME: Dayanara Monae   : 1945    AGE: 76 y.o. DATE: 2022   MRN: [id number]        Reason for Visit / Chief Complaint: Medicare AWV (MEDICARE AWV SUB )        Dayanara Monae presents for an  Medicare AWV today.     The following components were reviewed and updated:    Medical/Social/Family History:  Past Medical History:   Diagnosis Date    Actinic keratoses     Atrial fibrillation     CVA (cerebral vascular accident)     Diabetes mellitus     Epilepsy     HTN (hypertension)     Non-melanoma skin cancer     Skin cancer         Family History   Problem Relation Age of Onset    Hypertension Mother     Stroke Father     Diabetes Sister         Social History     Tobacco Use   Smoking Status Never Smoker   Smokeless Tobacco Never Used      Social History     Substance and Sexual Activity   Alcohol Use Never       Family History   Problem Relation Age of Onset    Hypertension Mother     Stroke Father     Diabetes Sister        Past Surgical History:   Procedure Laterality Date    APPENDECTOMY      carpel tunnel Right     CHOLECYSTECTOMY      EYE SURGERY      HAND SURGERY      HYSTERECTOMY      JOINT REPLACEMENT           · Allergies and Current Medications     Review of patient's allergies indicates:   Allergen Reactions    Vaseline carbolated petroleum Blisters    Bactrim [sulfamethoxazole-trimethoprim]        Current Outpatient Medications:     acitretin (SORIATANE) 25 MG capsule, Take one 25mg capsule PO daily, Disp: 30 capsule, Rfl: 2    albuterol (VENTOLIN HFA) 90 mcg/actuation inhaler, Inhale 2 puffs into the lungs every 6 (six) hours as needed for Wheezing or Shortness of Breath (cough)., Disp: 18 g, Rfl: 0    aspirin (ECOTRIN) 81 MG EC tablet, Take 81 mg by mouth once daily., Disp: , Rfl:     calcium carbonate (OS-FATOU) 600 mg calcium (1,500 mg) Tab, 1 tablet, Disp: , Rfl:     citalopram (CELEXA) 10 MG tablet,  Take 1 tablet (10 mg total) by mouth once daily., Disp: 90 tablet, Rfl: 1    donepeziL (ARICEPT) 5 MG tablet, Take 1 tablet (5 mg total) by mouth once daily., Disp: 90 tablet, Rfl: 1    DULoxetine (CYMBALTA) 30 MG capsule, TAKE 1 CAPSULE BY MOUTH ONCE DAILY FOR  JOINT  PAIN, Disp: 90 capsule, Rfl: 1    hydroCHLOROthiazide (HYDRODIURIL) 25 MG tablet, Take 1 tablet (25 mg total) by mouth once daily. For BLOOD PRESSURE, Disp: 90 tablet, Rfl: 1    latanoprost 0.005 % ophthalmic solution, INSTILL 1 DROP INTO EACH EYE AT BEDTIME AS DIRECTED, Disp: , Rfl:     latanoprost 0.005 % ophthalmic solution, 1 drop into affected eye in the evening, Disp: 2.5 mL, Rfl: 1    metoprolol tartrate (LOPRESSOR) 25 MG tablet, Take 1 tablet (25 mg total) by mouth 2 (two) times daily. For HEART, Disp: 180 tablet, Rfl: 1    omeprazole (PRILOSEC) 40 MG capsule, Take 1 capsule (40 mg total) by mouth once daily. For STOMACH, Disp: 90 capsule, Rfl: 1    potassium chloride (K-TAB) 20 mEq, 1 tablet with food, Disp: 90 tablet, Rfl: 1    rosuvastatin (CRESTOR) 20 MG tablet, 1 tablet, Disp: 90 tablet, Rfl: 1    valACYclovir (VALTREX) 1000 MG tablet, Take 2 tablets (2,000 mg total) by mouth every 12 (twelve) hours. for 1 day, Disp: 4 tablet, Rfl: 0    · Health Risk Assessment   Fall Risk: No    Advance Directive: Reports having an advanced directive   Depression: [id number]    HTN:  DASH diet, exercise, weight management, med compliance, home BP monitoring, and follow-up discussed.   T2DM: Diabetic diet, glucose monitoring, activity level, weight management, med compliance, and follow-up discussed.   Tobacco use: Denies tobacco use  STI: NA    Alcohol misuse: Denies alcohol use   Statin Use: Yes, encouraged patient to continue taking prescribed as prescribed      · Health Risk Assessment  What [age] 70-79  Are you male or female?: Female  During the past four weeks, how much have you been bothered by emotional problems such as feeling  anxious, depressed, irritable, sad, or downhearted and blue?: Slightly  During the past five weeks, has your physical and/or emotional health limited your social activities with family, friends, neighbors, or groups?: Slightly  During the past four weeks, how much bodily pain have you generally had?: No pain  During the past four weeks, was someone available to help if you needed and wanted help?: Yes, some  During the past four weeks, what was the hardest physical activity you could do for at least two minutes?: Heavy  Can you get to places out of walking distance without help?  (For example, can you travel alone on buses or taxis, or drive your own car?): Yes  Can you go shopping for groceries or clothes without someone's help?: Yes  Can you prepare your own meals?: Yes  Can you do your own housework without help?: Yes  Because of any health problems, do you need the help of another person with your personal care needs such as eating, bathing, dressing, or getting around the house?: No  Can you handle your own money without help?: Yes  During the past four weeks, how would you rate your health in general?: Good  How have things been going for you during the past four weeks?: Pretty well  Are you having difficulties driving your car?: Sometimes  Do you always fasten your seat belt when you are in a car?: Yes, usually  How often in the past four weeks have you been bothered by falling or dizzy when standing up?: Sometimes  How often in the past four weeks have you been bothered by sexual problems?: Never  How often in the past four weeks have you been bothered by trouble eating well?: Never  How often in the past four weeks have you been bothered by teeth or denture problems?: Never  How often in the past four weeks have you been bothered with problems using the telephone?: Never  How often in the past four weeks have you been bothered by tiredness or fatigue?: Never  Have you fallen two or more times in the past  year?: No  Are you afraid of falling?: No  Are you a smoker?: No  During the past four weeks, how many drinks of wine, beer, or other alcoholic beverages did you have?: No alcohol at all  Do you exercise for about 20 minutes three or more days a week?: Yes, some of the time  Have you been given any information to help you with hazards in your house that might hurt you?: Yes  Have you been given any information to help you with keeping track of your medications?: Yes  How often do you have trouble taking medicines the way you've been told to take them?: I always take them as prescribed  How confident are you that you can control and manage most of your health problems?: Very confident  What is your race? (Check all that apply.):     · Health Maintenance       Health Maintenance Topics with due status: Not Due       Topic Last Completion Date    Colorectal Cancer Screening 02/04/2020    DEXA SCAN 12/09/2020    Lipid Panel 12/17/2021    Hemoglobin A1c 01/17/2022    Low Dose Statin 01/31/2022     Health Maintenance Due   Topic Date Due    Diabetes Urine Screening  Never done    Foot Exam  Never done    Eye Exam  Never done    Mammogram  03/10/2021        Incontinence  Bowel: No  Bladder: No    Lab results available in Epic or see dates from [facility] above:   Lab Results   Component Value Date    CHOL 132 12/17/2021    CHOL 126 09/22/2021    CHOL 117 07/13/2021     Lab Results   Component Value Date    HDL 43 12/17/2021    HDL 46 09/22/2021    HDL 40 07/13/2021     Lab Results   Component Value Date    LDLCALC 56 12/17/2021    LDLCALC 59 09/22/2021    LDLCALC 55 07/13/2021     Lab Results   Component Value Date    TRIG 164 (H) 12/17/2021    TRIG 106 09/22/2021    TRIG 110 07/13/2021     Lab Results   Component Value Date    CHOLHDL 3.1 12/17/2021    CHOLHDL 2.7 09/22/2021    CHOLHDL 2.9 07/13/2021       Lab Results   Component Value Date    HGBA1C 8.9 (H) 01/17/2022       Sodium   Date Value Ref Range Status    12/17/2021 137 136 - 145 mmol/L Final     Potassium   Date Value Ref Range Status   12/17/2021 4.4 3.5 - 5.1 mmol/L Final     Chloride   Date Value Ref Range Status   12/17/2021 102 98 - 107 mmol/L Final     CO2   Date Value Ref Range Status   12/17/2021 31 21 - 32 mmol/L Final     Glucose   Date Value Ref Range Status   12/17/2021 184 (H) 74 - 106 mg/dL Final     BUN   Date Value Ref Range Status   12/17/2021 12 7 - 18 mg/dL Final     Creatinine   Date Value Ref Range Status   12/17/2021 0.86 0.55 - 1.02 mg/dL Final     Calcium   Date Value Ref Range Status   01/11/2022 9.6 8.5 - 10.1 mg/dL Final     Total Protein   Date Value Ref Range Status   12/17/2021 7.4 6.4 - 8.2 g/dL Final     Albumin   Date Value Ref Range Status   12/17/2021 3.4 (L) 3.5 - 5.0 g/dL Final     Bilirubin, Total   Date Value Ref Range Status   12/17/2021 0.3 0.0 - 1.2 mg/dL Final     Alk Phos   Date Value Ref Range Status   12/17/2021 73 55 - 142 U/L Final     AST   Date Value Ref Range Status   12/17/2021 24 15 - 37 U/L Final     ALT   Date Value Ref Range Status   12/17/2021 30 13 - 56 U/L Final     Anion Gap   Date Value Ref Range Status   12/17/2021 8 7 - 16 mmol/L Final     eGFR    Date Value Ref Range Status   04/06/2021 76       Comment:     The above 20 analytes were performed by [facility] [id number] [place] [id number]     eGFR   Date Value Ref Range Status   12/17/2021 69 >=60 mL/min/1.73m² Final         No results found for: PSA (Delete this line if female pt)               **See Completed Assessments for Annual Wellness visit within the encounter summary    The following assessments were completed & reviewed:  · Depression Screening  · Cognitive function Screening  · Timed Get Up Test  · Whisper Test  · Vision Screen  · Health Risk Assessment  · Checklist of ADLs and IADLs      Objective  Vitals:    01/31/22 1512   BP: 127/67   Pulse: 71   Resp: 18   Temp: 97.8 °F (36.6 °C)   SpO2: 97%   Weight:  "83.1 kg (183 lb 3.2 oz)   Height: 4' 11" (1.499 m)   PainSc: 0-No pain      Body mass index is 37 kg/m².  Ideal body weight: 48.8 kg (107 lb 8.4 oz)       Physical Exam  Vitals and nursing note reviewed.   Constitutional:       General: She is not in acute distress.     Appearance: Normal appearance. She is not ill-appearing.   Eyes:      Extraocular Movements: Extraocular movements intact.      Pupils: Pupils are equal, round, and reactive to light.   Cardiovascular:      Rate and Rhythm: Normal rate and regular rhythm.      Heart sounds: Normal heart sounds.   Pulmonary:      Effort: Pulmonary effort is normal.      Breath sounds: Normal breath sounds.   Abdominal:      General: Bowel sounds are normal.      Palpations: Abdomen is soft.   Musculoskeletal:         General: Normal range of motion.   Skin:     Findings: No rash.   Neurological:      General: No focal deficit present.      Mental Status: She is alert and oriented to person, place, and time. Mental status is at baseline.   Psychiatric:         Mood and Affect: Mood normal.         Behavior: Behavior normal.           Assessment:     1. Asymptomatic menopause  - DXA Bone Density Spine And Hip; Future    2. Encounter for screening for malignant neoplasm of breast, unspecified screening modality  - Mammo Digital Screening Bilat; Future    3. Encounter for immunization  - Influenza - Quadrivalent - High Dose (65+) (PF) (IM)    4. Encounter for screening mammogram for malignant neoplasm of breast   - Mammo Digital Screening Bilat; Future    5. Mixed hyperlipidemia    6. Essential hypertension    7. Type 2 diabetes mellitus without complication, without long-term current use of insulin         Plan:    Referrals:   NA     Advised to call office if does not hear from anyone with referral appt within 2-3 weeks to check on status of referral. Voiced understanding.        Discussed and provided with a screening schedule and personal prevention plan in accordance " with USPSTF age appropriate recommendations and Medicare screening guidelines.   Education, counseling, and referrals were provided as needed.  After Visit Summary printed and given to patient which includes written education and a list of any referrals if indicated.     F/u plan for yearly AWV.    Signature: Salty Barahona MD

## 2022-01-31 NOTE — PATIENT INSTRUCTIONS
Patient Education       Exercise and Aging   General   Exercise can help older adults prevent falls and stay independent longer. Exercise may also help:  · You have stronger muscles and bones and better balance  · You lose weight and have more energy  · Make your heart and lungs stronger  · Lower your chance of health problems like heart disease, high blood sugar, or breast or colon cancer  · Control conditions like high blood pressure and diabetes  · You manage stress and get better sleep  · Your mood, self-esteem, and self-image  · How you think, plan, and pay attention  There are four types of exercise: endurance, strength, balance, and flexibility.  · Endurance exercises get your heart rate up and keep it up for a while. Most people should get at least 30 minutes of exercise that gets your heart rate up on 5 or more days each week. Walking, swimming, biking, or going up and down stairs are kinds of exercises to get your heart rate up. You do not have to do all 30 minutes at one time. Try doing 10 minutes 3 times a day.  · Strength exercises build muscle. Lifting weights or doing knee bends are kinds of strength exercises.  · Balance exercises help to prevent falls. Raise up on your toes or stand on one leg as a kind of balance exercise.  · Flexibility exercises move your joints through a full range of motion and stretch your muscles. Bend forward in a chair to stretch your back, do stretches, or bend and extend your arms or legs as a kind of flexibility exercise. Try doing 10 minutes twice a week.  Plan to include all these exercise types in your exercise program. Always check with your doctor before you start a new exercise program.  Some people do not like formal exercise classes, but there are many ways to work your muscles each day. Here are some things you can do.  · Use steps instead of elevators.  · Park far away in parking lots to walk farther.  · Use the time while you wait. Do knee bends as you hold  onto the kitchen counter while you wait for your coffee to brew.  · When you unload groceries, lift the bags or a container of milk a few times to exercise your arms and legs.  · Walk the long way when you go somewhere.  · After you walk to the bathroom, walk a few laps around the house before sitting down.  · Try doing different standing exercises after you wash your hands each time in the bathroom.  · Do balance exercises while you brush your teeth.  · Do an exercise or get up and walk during TV commercials.  · Don't forget to exercise small muscle groups like your hands, fingers, ankles, toes, and neck. Wiggle, bend, flex, and rotate these joints from left to right and back to front to help to keep these joints flexible.  When do I need to call the doctor?   Stop exercising and talk to your doctor if you have any of these problems:  · Dizziness  · Shortness of breath  · Pain or pressure in the chest, arms, throat, jaw, or back  · Nausea or vomiting  · Blood clots  · Infection  · Joint swelling  · Open wounds  · Recent hip, back, or eye surgery  · New problems that start during exercise  Helpful tips   · If you have a health problem like heart disease or diabetes, talk with your doctor about the best exercises for you.  · Always warm up before you stretch. Heated muscles stretch much easier than cool muscles. Try to walk or bike at an easy pace for a few minutes to warm up your muscles.  · Slow your pace again after you exercise to cool down and bring your heart rate down slowly.  · Be sure you do not hold your breath when you exercise because it can raise your blood pressure. If you tend to hold your breath, try to count out loud when you exercise.  · Never bounce when doing stretches. Use slow and steady motions and hold your stretch for 20 to 30 seconds.  · Have a routine. Doing exercises before a meal may be a good way to get into a routine.  · Set small goals for yourself when you start to exercise. Use a  chart to see how much you are doing. Ask someone to exercise with you.  · Exercise may be slightly uncomfortable, but you should not have sharp pains. If you do get sharp pains, stop what you are doing. If the sharp pains continue, call your doctor.  · Drink plenty of fluids without caffeine when you exercise and afterwards. Avoid outdoor exercise if it is too cold or too hot.  · Wear the right clothes and shoes. Try layers of clothes, so that you can take them off if you get too hot. Shoes should fit well and support your feet.  Where can I learn more?   [employer] Aging  https://www.dale.nih.gov/health/publication/exercise-physical-activity/introduction   Last Reviewed Date   2021-03-18  Consumer Information Use and Disclaimer   This information is not specific medical advice and does not replace information you receive from your health care provider. This is only a brief summary of general information. It does NOT include all information about conditions, illnesses, injuries, tests, procedures, treatments, therapies, discharge instructions or life-style choices that may apply to you. You must talk with your health care provider for complete information about your health and treatment options. This information should not be used to decide whether or not to accept your health care providers advice, instructions or recommendations. Only your health care provider has the knowledge and training to provide advice that is right for you.  Copyright   Copyright © 2021 UpToDate, Inc. and its affiliates and/or licensors. All rights reserved.  Patient Education       Preventing Falls   The Basics   Written by the doctors and editors at [employer]   Am I at risk of falling? -- Your risk of falling increases as you grow older. That's because getting older can make it harder to walk steadily and keep your balance. Also, the effects of falls are more serious in older people.  Overall, 3 to 4 out of every 10 people over  the age of 65 fall each year. Up to 75 percent of people who fracture a hip never recover to the point they were before they had their fracture. If you have fallen in the past, you are at higher risk of falling again.  Several things can increase your risk of a fall, including:  · Illness  · A change in the medicines you take  · An unsafe or unfamiliar setting (for example, a room with rugs or furniture that might trip you, or an area you don't know well)  How can my doctor help me to avoid falling? -- Your doctor can talk to you about the following things:  · Past falls - It is important to tell your doctor about any times you have fallen or almost fallen. He or she can then suggest ways to prevent another fall.  · Your health conditions - Some health problems can put you at risk of falling. These include conditions that affect eyesight, hearing, muscle strength, or balance.  · The medicines you take - Certain medicines can increase the risk of falling. These include some medicines that are used for sleeping problems, anxiety, high blood pressure, or depression. Adding new medicines, or changing doses of some medicines, can also affect your risk of falling.  The more your doctor knows about your situation, the better he or she will be able to help you. For example, if you fell because you have a condition that causes pain, your doctor might suggest treatments to deal with the pain. Or if one of your medicines is making you dizzy and more likely to fall, your doctor might switch you to a different medicine.  Is there anything I can do on my own? -- Yes. To help keep from falling, you can:  · Make your home safer - To avoid falling at home, get rid of things that might make you trip or slip. This might include furniture, electrical cords, clutter, and loose rugs (figure 1). Keep your home well-lit so that you can easily see where you are going. Avoid storing things in high places so you don't have to reach or  climb.  · Wear sturdy shoes that fit well - Wearing shoes with high heels or slippery soles, or shoes that are too loose, can lead to falls. Walking around in bare feet, or only socks, can also increase your risk of falling.  · Take vitamin D pills - Taking vitamin D might lower the risk of falls in older people. This is because vitamin D helps make bones and muscles stronger. Your doctor can talk to you about whether you should take extra vitamin D, and how much.  · Stay active - Exercising on a regular basis can help lower your risk of falling. It might also help prevent you from getting hurt if you do fall. It is best to do a few different activities that help with both strength and balance. There are many kinds of exercise that can be safe for older people. These include walking, swimming, and Jaun Chi (a Chinese martial art that involves slow, gentle movements).  · Use a cane, walker, and other safety devices - If your doctor recommends that you use a cane or walker, be sure that it's the right size and you know how to use it. There are other devices that might help you avoid falling, too. These include grab bars or a sturdy seat for the shower, non-slip bath mats, and hand rails or treads for the stairs (to prevent slipping).  If you worry that you could fall, there are also alarm buttons that let you call for help if you fall and can't get up.  What should I do if I fall? -- If you fall, see your doctor right away, even if you aren't hurt. Your doctor can try to figure out what caused you to fall, and how likely you are to fall again. He or she will do an exam and talk to you about your health problems, medicines, and activities. Then he or she can suggest things you can do to avoid falling again.  Many older people have a hard time recovering after a fall. Doing things to prevent falling can help you to protect your health and independence.  All topics are updated as new evidence becomes available and our  peer review process is complete.  This topic retrieved from [employer] on: Sep 21, 2021.  Topic 96441 Version 18.0  Release: 29.4.2 - C29.263  © 2021 UpToDate, Inc. and/or its affiliates. All rights reserved.  figure 1: How to avoid falling at home     This picture shows some of the things that can cause a fall in your home. Look around and remove any loose rugs, electrical cords, clutter, or furniture that could trip you.  Graphic 59173 Version 1.0    Consumer Information Use and Disclaimer   This information is not specific medical advice and does not replace information you receive from your health care provider. This is only a brief summary of general information. It does NOT include all information about conditions, illnesses, injuries, tests, procedures, treatments, therapies, discharge instructions or life-style choices that may apply to you. You must talk with your health care provider for complete information about your health and treatment options. This information should not be used to decide whether or not to accept your health care provider's advice, instructions or recommendations. Only your health care provider has the knowledge and training to provide advice that is right for you. The use of this information is governed by the [employer] End User License Agreement, available at [email or web address].The use of [employer] content is governed by the [employer] Terms of Use. ©2021 UpToDate, Inc. All rights reserved.  Copyright   © 2021 UpToDate, Inc. and/or its affiliates. All rights reserved.  Patient Education       Advance Directives   The Basics   Written by the doctors and editors at [employer]   What are advance directives? -- Advance directives are legal documents that allow you to spell out ahead of time what of types medical care you would want if you ever became unable to speak for yourself. These documents can help ensure that you get the care you want even if  you have an unexpected serious illness or accident. The documents can also make things easier for the people who will need to make decisions for you if you ever become unable to make them for yourself.  Are there different kinds of advance directives? -- Yes. The most useful kinds of advance directives are:  · Health care proxy (also called the durable power of  for health care) - The health care proxy document allows you to choose someone to make medical decisions for you if you become unable to speak for yourself. The benefit of having this document is that it makes your choice of a decision-maker clear to your doctors and family members. When you choose a health care proxy, it is important to talk to the person you choose about the things that you do or don't want. That way your decision-maker knows what to do later on if they ever have to speak for you.  · Living will - A living will is the document that tells health care providers what type of care you want if you become unable to speak for yourself. For instance, a living will allows you to record in writing whether you would want a feeding tube put in if you had a serious illness or accident.  · Do not resuscitate/do not intubate order (also called a DNR/DNI) - If you decide you do not want your heart restarted if it stops and you do not want a breathing tube put in if you stop breathing, you can ask for a DNR/DNI. This is a form that must be signed by a doctor. It tells all your health care providers that you have decided you do not want these treatments.  Advance directives work best when they are part of a team effort that includes not only the person making the decisions, but also doctors, emergency health workers, and places like hospitals and nursing homes.  The Physician Orders for Life Sustaining Treatment (POLST) is a form for people who already have a serious illness or are very weak and likely to need medical help. The POLST form spells out  "exactly what care should be given, and not given, based on your choices and wishes. It is signed by your doctor, and you can keep a copy at home to be used in the event of an emergency. A copy is also kept on file at any hospital or other place where you might get medical care. Not every state has a POLST program. To find out if your state has one, you can go online to www.polst.org.  How do I choose a health care proxy? -- Choose someone who:  · You know and trust  · Can separate their own wishes from yours  · You know would carry out your wishes if that became necessary  · Could be easily reached if they were needed  · Could handle it if other family members or loved ones wanted you to get treated differently than you would want  Some people choose a second person as an alternate proxy, in case their first choice cannot be reached at the time decisions need to be made.  Who should have an advance directive? -- Advance directives are a good idea for anyone, but they are especially important if:  · You are older than 65.  · You have a serious life-threatening illness, such as advanced cancer, or end-stage heart or liver failure.  · You want to make sure you can choose the person you would like as your health care proxy (decision-maker).  What kinds of decisions will I need to make? -- Your advance directives can have as much or as little detail as you want. But many people who have advance directives record their wishes about the following treatments:  · Breathing tubes - If you stop breathing or are having a very hard time breathing, you can get attached to a machine that will help you breathe. For that to happen, you will have to be "intubated." That means that a tube will be put down your throat and into your lungs. Then the tube will be connected to a breathing machine. When the tube is in place, you will not be able to talk, at least at first. Plus, you will probably be sedated, meaning that you are on " "medicines that make you sleep.  Sometimes a breathing machine is needed only for a short time. For instance, some people need the breathing machine just while they recover from a lung infection. When deciding about a breathing tube, consider whether you would want it at all, want it only for a short time, or want it no matter what. Also, keep in mind that any time a breathing machine is used, it is hard to know for sure if and when it will be able to be disconnected.  · Cardiopulmonary resuscitation (CPR) - If your heart stops beating suddenly, doctors might be able to restart it by pumping on your chest, putting in a breathing tube and pushing air into your lungs, giving you an electric shock (called "defibrillation"), and/or giving you special medicines. Some people recover completely after having their heart restarted. Others have permanent brain damage from a lack of blood flow to the brain; this is most likely in people who have an advanced, serious illness.  · Feeding tubes - If you become unable to eat, you can have a tube put into your stomach or intestines that can deliver nutrients. A feeding tube can keep a person's body going while they heal and gets strong. But it can also keep a person alive for a long time even if there is no chance the person will recover.  Can I change my mind? -- Yes. You can change your mind at any time. If you sign an advance directive and you decide you want a different kind of treatment or you no longer want the health care proxy you chose, all you have to do is tell your doctor or nurse about your new decision. If you want to name a new health care proxy or want to record new wishes, you can draw up new documents.  How can I draw up an advance directive? -- The following table lists resources that can help you learn more about making your own advance directives (table 1).  All topics are updated as new evidence becomes available and our peer review process is complete.  This " topic retrieved from [employer] on: Sep 21, 2021.  Topic 67236 Version 13.0  Release: 29.4.2 - C29.263  © 2021 UpToDate, Inc. and/or its affiliates. All rights reserved.  table 1: Resources that can help you make advance directives     Address  Phone number  Website    [facility]  [address] [place] [id number] Toll-free: (505) OUR-AAR  [([phone] 754-4930] http://assets.aar.org/external_sites/ caregiving/multimedia/EG_AdvanceDirectives.html    Aging with Dignity (Five Wishes form)  [address]  [place] [address] Toll-free: (229) 5WISHES  [phone]] www.agingwithdignity.org    [facility]    Toll-free: (840) 364-9420 www.caringinfo.org    [facility] [employer]  [place] [facility]  c[employer]o [employer]  [address] [place] [address] [phone] www.[employer]    Graphic 62412 Version 7.0  Consumer Information Use and Disclaimer   This information is not specific medical advice and does not replace information you receive from your health care provider. This is only a brief summary of general information. It does NOT include all information about conditions, illnesses, injuries, tests, procedures, treatments, therapies, discharge instructions or life-style choices that may apply to you. You must talk with your health care provider for complete information about your health and treatment options. This information should not be used to decide whether or not to accept your health care provider's advice, instructions or recommendations. Only your health care provider has the knowledge and training to provide advice that is right for you. The use of this information is governed by the [employer] End User License Agreement, available at [email or web address]://[email or web address]/en/solutions/[employer]/[email or web address]/lianna.The use of [employer] content is governed by the [employer] Terms of Use. ©2021 UpToDate, Inc. All rights reserved.  Copyright   © 2021 UpToDate, Inc. and/or its affiliates. All rights reserved.  Counseling  and Referral of Other Preventative  (Italic type indicates deductible and co-insurance are waived)    Patient Name: Dayanara Monae  Today's Date: 1/31/2022    Health Maintenance       Date Due Completion Date    Mammogram 03/10/2021 3/10/2020    Influenza Vaccine (1) 06/30/2022 (Originally 9/1/2021) ---    Shingles Vaccine (1 of 2) 01/11/2023 (Originally 10/27/1997) ---    TETANUS VACCINE 01/31/2023 (Originally 10/27/1963) ---    COVID-19 Vaccine (3 - Booster) 01/31/2023 (Originally 1/14/2022) 7/14/2021    DEXA SCAN 12/09/2023 12/9/2020    Lipid Panel 12/17/2026 12/17/2021    Colorectal Cancer Screening 02/04/2030 2/4/2020        Orders Placed This Encounter   Procedures    Mammo Digital Screening Bilat    DXA Bone Density Spine And Hip    Influenza - Quadrivalent - High Dose (65+) (PF) (IM)

## 2022-02-04 ENCOUNTER — IMMUNIZATION (OUTPATIENT)
Dept: FAMILY MEDICINE | Facility: CLINIC | Age: 77
End: 2022-02-04
Payer: MEDICARE

## 2022-02-04 DIAGNOSIS — Z23 NEED FOR VACCINATION: Primary | ICD-10-CM

## 2022-02-04 PROCEDURE — 91306 COVID-19, MRNA, LNP-S, PF, 100 MCG/0.25 ML DOSE VACCINE (MODERNA BOOSTER): CPT | Mod: ,,, | Performed by: FAMILY MEDICINE

## 2022-02-04 PROCEDURE — 0064A COVID-19, MRNA, LNP-S, PF, 100 MCG/0.25 ML DOSE VACCINE (MODERNA BOOSTER): ICD-10-PCS | Mod: ,,, | Performed by: FAMILY MEDICINE

## 2022-02-04 PROCEDURE — 91306 COVID-19, MRNA, LNP-S, PF, 100 MCG/0.25 ML DOSE VACCINE (MODERNA BOOSTER): ICD-10-PCS | Mod: ,,, | Performed by: FAMILY MEDICINE

## 2022-02-04 PROCEDURE — 0064A COVID-19, MRNA, LNP-S, PF, 100 MCG/0.25 ML DOSE VACCINE (MODERNA BOOSTER): CPT | Mod: ,,, | Performed by: FAMILY MEDICINE

## 2022-03-11 DIAGNOSIS — Z71.89 COMPLEX CARE COORDINATION: ICD-10-CM

## 2022-03-24 ENCOUNTER — OFFICE VISIT (OUTPATIENT)
Dept: DERMATOLOGY | Facility: CLINIC | Age: 77
End: 2022-03-24
Payer: MEDICARE

## 2022-03-24 DIAGNOSIS — L40.9 PSORIASIS: Primary | ICD-10-CM

## 2022-03-24 DIAGNOSIS — L40.0 PLAQUE PSORIASIS: Chronic | ICD-10-CM

## 2022-03-24 DIAGNOSIS — Z79.899 HIGH RISK MEDICATION USE: ICD-10-CM

## 2022-03-24 PROCEDURE — 99214 OFFICE O/P EST MOD 30 MIN: CPT | Mod: ,,, | Performed by: DERMATOLOGY

## 2022-03-24 PROCEDURE — 99214 PR OFFICE/OUTPT VISIT, EST, LEVL IV, 30-39 MIN: ICD-10-PCS | Mod: ,,, | Performed by: DERMATOLOGY

## 2022-03-24 RX ORDER — ACITRETIN 25 MG/1
CAPSULE ORAL
Qty: 30 CAPSULE | Refills: 2 | Status: SHIPPED | OUTPATIENT
Start: 2022-03-24 | End: 2022-06-29 | Stop reason: SDUPTHER

## 2022-03-24 NOTE — PROGRESS NOTES
[facility]   Rebekah Garcia MD    Patient Name: Dayanara Monae  Patient YOB: 1945   Date of Service: 3/24/22    CC: Follow-up Psoriasis    HPI: Dayanara Monae is a 76 y.o. female here today for follow-up of psoriasis, last seen 12/21 with Dr. Motley.  Previous treatments include Acitretin.  Overall, the psoriasis is improved.  Treatment plan was followed as directed. Patient is also concerned about an itchy spot on the left lower back.     Past Medical History:   Diagnosis Date    Actinic keratoses     Atrial fibrillation     CVA (cerebral vascular accident)     Diabetes mellitus     Epilepsy     HTN (hypertension)     Non-melanoma skin cancer     Skin cancer      Past Surgical History:   Procedure Laterality Date    APPENDECTOMY      carpel tunnel Right     CHOLECYSTECTOMY      EYE SURGERY      HAND SURGERY      HYSTERECTOMY      JOINT REPLACEMENT       Review of patient's allergies indicates:   Allergen Reactions    Vaseline carbolated petroleum Blisters    Bactrim [sulfamethoxazole-trimethoprim]        Current Outpatient Medications:     acitretin (SORIATANE) 25 MG capsule, Take one 25mg capsule PO daily, Disp: 30 capsule, Rfl: 2    albuterol (VENTOLIN HFA) 90 mcg/actuation inhaler, Inhale 2 puffs into the lungs every 6 (six) hours as needed for Wheezing or Shortness of Breath (cough)., Disp: 18 g, Rfl: 0    aspirin (ECOTRIN) 81 MG EC tablet, Take 81 mg by mouth once daily., Disp: , Rfl:     calcium carbonate (OS-FATOU) 600 mg calcium (1,500 mg) Tab, 1 tablet, Disp: , Rfl:     citalopram (CELEXA) 10 MG tablet, Take 1 tablet (10 mg total) by mouth once daily., Disp: 90 tablet, Rfl: 1    donepeziL (ARICEPT) 5 MG tablet, Take 1 tablet (5 mg total) by mouth once daily., Disp: 90 tablet, Rfl: 1    DULoxetine (CYMBALTA) 30 MG capsule, TAKE 1 CAPSULE BY MOUTH ONCE DAILY FOR  JOINT  PAIN, Disp: 90 capsule, Rfl: 1    hydroCHLOROthiazide (HYDRODIURIL) 25 MG tablet, Take 1  tablet (25 mg total) by mouth once daily. For BLOOD PRESSURE, Disp: 90 tablet, Rfl: 1    latanoprost 0.005 % ophthalmic solution, INSTILL 1 DROP INTO EACH EYE AT BEDTIME AS DIRECTED, Disp: , Rfl:     latanoprost 0.005 % ophthalmic solution, 1 drop into affected eye in the evening, Disp: 2.5 mL, Rfl: 1    metoprolol tartrate (LOPRESSOR) 25 MG tablet, Take 1 tablet (25 mg total) by mouth 2 (two) times daily. For HEART, Disp: 180 tablet, Rfl: 1    omeprazole (PRILOSEC) 40 MG capsule, Take 1 capsule (40 mg total) by mouth once daily. For STOMACH, Disp: 90 capsule, Rfl: 1    potassium chloride (K-TAB) 20 mEq, 1 tablet with food, Disp: 90 tablet, Rfl: 1    rosuvastatin (CRESTOR) 20 MG tablet, 1 tablet, Disp: 90 tablet, Rfl: 1    valACYclovir (VALTREX) 1000 MG tablet, Take 2 tablets (2,000 mg total) by mouth every 12 (twelve) hours. for 1 day, Disp: 4 tablet, Rfl: 0    ROS: A focused review of systems was obtained and negative.     Exam: A focused skin exam was performed. All areas examined were normal except as mentioned in the assessment and plan below.  General Appearance of the patient is well developed and well nourished.  Orientation: alert and oriented x 3.  Mood and affect: pleasant.    Assessment:   The primary encounter diagnosis was Psoriasis. Diagnoses of High risk medication use and Plaque psoriasis were also pertinent to this visit.    Plan:   Medications Ordered This Encounter   Medications    acitretin (SORIATANE) 25 MG capsule     Sig: Take one 25mg capsule PO daily     Dispense:  30 capsule     Refill:  2     Plaque Psoriasis  - psoriasiform plaques with micaceous scale  Status: controlled    Plan: Counseling  I counseled the patient regarding the following:  Skin care: Emollients, ambient sun exposure, shampoos with tar, selenium or zinc pyrithione can improve psoriasis.  Expectations: Psoriasis is chronic in nature with periods of remissions and flares. Flares can be triggered by stress,  infections (group A strep), certain medications and alcohol.  Contact office if: Psoriasis worsens, or fails to improve despite several months of treatment.    High Risk Medication Monitoring (Z79.899) : The risks and benefits of the medication were reviewed in full with the patient. Should any side effects occur, the patient will stop the medication and contact me immediately.  Will obtain Lipids and Hepatic panel    Soriatane Counseling: I discussed with the patient the risks of acitretin including but not limited to hair loss, dry  lips/skin/eyes, liver damage, hyperlipidemia, depression/suicidal ideation, photosensitivity. Serious rare side  effects can include but are not limited to pancreatitis, pseudotumor cerebri, bony changes, clot  formation/stroke/heart attack. Patient understands that alcohol is contraindicated since it can result in liver toxicity  and significantly prolong the elimination of the drug by many years. Patient understands that this drug is not  meant for any woman of childbearing age or who plans to get pregnant since it is teratogenic and has a long drug  elimination half life.    Follow up in about 3 months (around 6/24/2022) for Psoriasis.    Rebekah Garcia MD

## 2022-04-25 ENCOUNTER — OFFICE VISIT (OUTPATIENT)
Dept: FAMILY MEDICINE | Facility: CLINIC | Age: 77
End: 2022-04-25
Payer: MEDICARE

## 2022-04-25 VITALS
BODY MASS INDEX: 35.16 KG/M2 | OXYGEN SATURATION: 97 % | WEIGHT: 174.38 LBS | TEMPERATURE: 98 F | DIASTOLIC BLOOD PRESSURE: 60 MMHG | SYSTOLIC BLOOD PRESSURE: 124 MMHG | RESPIRATION RATE: 16 BRPM | HEIGHT: 59 IN | HEART RATE: 54 BPM

## 2022-04-25 DIAGNOSIS — I10 ESSENTIAL HYPERTENSION: Chronic | ICD-10-CM

## 2022-04-25 DIAGNOSIS — R09.82 POST-NASAL DRIP: ICD-10-CM

## 2022-04-25 DIAGNOSIS — R06.7 SNEEZING WITH WATERY EYES: ICD-10-CM

## 2022-04-25 DIAGNOSIS — R51.9 ACUTE NONINTRACTABLE HEADACHE, UNSPECIFIED HEADACHE TYPE: ICD-10-CM

## 2022-04-25 DIAGNOSIS — H04.209 SNEEZING WITH WATERY EYES: ICD-10-CM

## 2022-04-25 DIAGNOSIS — R50.9 FEVER, UNSPECIFIED FEVER CAUSE: ICD-10-CM

## 2022-04-25 DIAGNOSIS — J06.9 UPPER RESPIRATORY TRACT INFECTION, UNSPECIFIED TYPE: Primary | ICD-10-CM

## 2022-04-25 DIAGNOSIS — E78.2 MIXED HYPERLIPIDEMIA: Chronic | ICD-10-CM

## 2022-04-25 DIAGNOSIS — E11.9 TYPE 2 DIABETES MELLITUS WITHOUT COMPLICATION, WITHOUT LONG-TERM CURRENT USE OF INSULIN: Chronic | ICD-10-CM

## 2022-04-25 LAB
CTP QC/QA: YES
EST. AVERAGE GLUCOSE BLD GHB EST-MCNC: 177 MG/DL
FLUAV AG NPH QL: NEGATIVE
FLUBV AG NPH QL: NEGATIVE
HBA1C MFR BLD HPLC: 7.9 % (ref 4.5–6.6)

## 2022-04-25 PROCEDURE — 96372 PR INJECTION,THERAP/PROPH/DIAG2ST, IM OR SUBCUT: ICD-10-PCS | Mod: ,,, | Performed by: FAMILY MEDICINE

## 2022-04-25 PROCEDURE — 83036 HEMOGLOBIN A1C: ICD-10-PCS | Mod: ,,, | Performed by: CLINICAL MEDICAL LABORATORY

## 2022-04-25 PROCEDURE — 87804 INFLUENZA ASSAY W/OPTIC: CPT | Mod: RHCUB | Performed by: FAMILY MEDICINE

## 2022-04-25 PROCEDURE — 96372 THER/PROPH/DIAG INJ SC/IM: CPT | Mod: ,,, | Performed by: FAMILY MEDICINE

## 2022-04-25 PROCEDURE — 99214 OFFICE O/P EST MOD 30 MIN: CPT | Mod: ,,, | Performed by: FAMILY MEDICINE

## 2022-04-25 PROCEDURE — 83036 HEMOGLOBIN GLYCOSYLATED A1C: CPT | Mod: ,,, | Performed by: CLINICAL MEDICAL LABORATORY

## 2022-04-25 PROCEDURE — 99214 PR OFFICE/OUTPT VISIT, EST, LEVL IV, 30-39 MIN: ICD-10-PCS | Mod: ,,, | Performed by: FAMILY MEDICINE

## 2022-04-25 RX ORDER — DEXAMETHASONE SODIUM PHOSPHATE 4 MG/ML
2 INJECTION, SOLUTION INTRA-ARTICULAR; INTRALESIONAL; INTRAMUSCULAR; INTRAVENOUS; SOFT TISSUE
Status: COMPLETED | OUTPATIENT
Start: 2022-04-25 | End: 2022-04-25

## 2022-04-25 RX ORDER — DEXBROMPHENIRAMINE MALEATE, DEXTROMETHORPHAN HBR, PHENYLEPHRINE HCL 2; 15; 7.5 MG/5ML; MG/5ML; MG/5ML
5 LIQUID ORAL 3 TIMES DAILY PRN
Qty: 120 ML | Refills: 0 | Status: SHIPPED | OUTPATIENT
Start: 2022-04-25 | End: 2022-08-02 | Stop reason: ALTCHOICE

## 2022-04-25 RX ORDER — AZITHROMYCIN 250 MG/1
TABLET, FILM COATED ORAL
Qty: 6 TABLET | Refills: 0 | Status: SHIPPED | OUTPATIENT
Start: 2022-04-25 | End: 2022-04-30

## 2022-04-25 RX ORDER — CEFTRIAXONE 1 G/1
1 INJECTION, POWDER, FOR SOLUTION INTRAMUSCULAR; INTRAVENOUS
Status: COMPLETED | OUTPATIENT
Start: 2022-04-25 | End: 2022-04-25

## 2022-04-25 RX ADMIN — CEFTRIAXONE 1 G: 1 INJECTION, POWDER, FOR SOLUTION INTRAMUSCULAR; INTRAVENOUS at 09:04

## 2022-04-25 RX ADMIN — DEXAMETHASONE SODIUM PHOSPHATE 2 MG: 4 INJECTION, SOLUTION INTRA-ARTICULAR; INTRALESIONAL; INTRAMUSCULAR; INTRAVENOUS; SOFT TISSUE at 09:04

## 2022-04-25 NOTE — PROGRESS NOTES
Salty Barahona MD    [address] [place] [address]     PATIENT NAME: Dayanara Monae  : 1945  DATE: 22  MRN: [id number]      Billing Provider: Salty Barahona MD  Level of Service:   Patient PCP Information     Provider PCP Type    Salty Barahona MD General          Reason for Visit / Chief Complaint: Sinusitis, sneezing, Cough, and Sore Throat (X 6 days, she states she has been working in her garden all last week.)       Update PCP  Update Chief Complaint         History of Present Illness / Problem Focused Workflow     Dayanara Monae presents to the clinic with Sinusitis, sneezing, Cough, and Sore Throat (X 6 days, she states she has been working in her garden all last week.)     HPI    Review of Systems     Review of Systems   Constitutional: Positive for fever. Negative for activity change, appetite change, chills and fatigue.   HENT: Positive for nasal congestion, postnasal drip and sneezing. Negative for ear pain, hearing loss and sore throat.    Respiratory: Positive for cough. Negative for chest tightness, shortness of breath and wheezing.    Cardiovascular: Negative for chest pain, palpitations, leg swelling and claudication.   Gastrointestinal: Negative for abdominal pain, change in bowel habit, constipation, diarrhea, nausea, vomiting and change in bowel habit.   Genitourinary: Negative for dysuria.   Musculoskeletal: Negative for arthralgias, back pain, gait problem and myalgias.   Integumentary:  Negative for rash.   Neurological: Positive for headaches. Negative for weakness.   Psychiatric/Behavioral: Negative for suicidal ideas. The patient is not nervous/anxious.         Medical / Social / Family History     Past Medical History:   Diagnosis Date    Actinic keratoses     Atrial fibrillation     CVA (cerebral vascular accident)     Diabetes mellitus     Epilepsy     HTN (hypertension)     Non-melanoma skin cancer      Skin cancer        Past Surgical History:   Procedure Laterality Date    APPENDECTOMY      carpel tunnel Right     CHOLECYSTECTOMY      EYE SURGERY      HAND SURGERY      HYSTERECTOMY      JOINT REPLACEMENT         Social History  Ms.  reports that she has never smoked. She has never used smokeless tobacco. She reports that she does not drink alcohol and does not use drugs.    Family History  Ms.'s family history includes Diabetes in her sister; Hypertension in her mother; Stroke in her father.    Medications and Allergies     Medications  Outpatient Medications Marked as Taking for the 4/25/22 encounter (Office Visit) with Salty Barahona MD   Medication Sig Dispense Refill    acitretin (SORIATANE) 25 MG capsule Take one 25mg capsule PO daily 30 capsule 2    albuterol (VENTOLIN HFA) 90 mcg/actuation inhaler Inhale 2 puffs into the lungs every 6 (six) hours as needed for Wheezing or Shortness of Breath (cough). 18 g 0    aspirin (ECOTRIN) 81 MG EC tablet Take 81 mg by mouth once daily.      calcium carbonate (OS-FATOU) 600 mg calcium (1,500 mg) Tab 1 tablet      citalopram (CELEXA) 10 MG tablet Take 1 tablet (10 mg total) by mouth once daily. 90 tablet 1    donepeziL (ARICEPT) 5 MG tablet Take 1 tablet (5 mg total) by mouth once daily. 90 tablet 1    DULoxetine (CYMBALTA) 30 MG capsule TAKE 1 CAPSULE BY MOUTH ONCE DAILY FOR  JOINT  PAIN 90 capsule 1    hydroCHLOROthiazide (HYDRODIURIL) 25 MG tablet Take 1 tablet (25 mg total) by mouth once daily. For BLOOD PRESSURE 90 tablet 1    latanoprost 0.005 % ophthalmic solution INSTILL 1 DROP INTO EACH EYE AT BEDTIME AS DIRECTED      latanoprost 0.005 % ophthalmic solution 1 drop into affected eye in the evening 2.5 mL 1    metoprolol tartrate (LOPRESSOR) 25 MG tablet Take 1 tablet (25 mg total) by mouth 2 (two) times daily. For HEART 180 tablet 1    omeprazole (PRILOSEC) 40 MG capsule Take 1 capsule (40 mg total) by mouth once daily. For STOMACH 90  capsule 1    potassium chloride (K-TAB) 20 mEq 1 tablet with food 90 tablet 1    rosuvastatin (CRESTOR) 20 MG tablet 1 tablet 90 tablet 1     Current Facility-Administered Medications for the 4/25/22 encounter (Office Visit) with Salty Barahona MD   Medication Dose Route Frequency Provider Last Rate Last Admin    [COMPLETED] cefTRIAXone injection 1 g  1 g Intramuscular 1 time in Clinic/HOD Salty Barahona MD   1 g at 04/25/22 0935    [COMPLETED] dexamethasone injection 2 mg  2 mg Intramuscular 1 time in Clinic/HOD Salty Barahona MD   2 mg at 04/25/22 0936       Allergies  Review of patient's allergies indicates:   Allergen Reactions    Vaseline carbolated petroleum Blisters    Bactrim [sulfamethoxazole-trimethoprim]        Physical Examination     Vitals:    04/25/22 0853   BP: 124/60   Pulse: (!) 54   Resp: 16   Temp: 98 °F (36.7 °C)     Physical Exam  Vitals and nursing note reviewed.   Constitutional:       General: She is not in acute distress.     Appearance: Normal appearance. She is not ill-appearing.   Eyes:      Extraocular Movements: Extraocular movements intact.      Pupils: Pupils are equal, round, and reactive to light.   Cardiovascular:      Rate and Rhythm: Normal rate and regular rhythm.      Heart sounds: Normal heart sounds.   Pulmonary:      Effort: Pulmonary effort is normal.      Breath sounds: Examination of the right-middle field reveals rhonchi. Examination of the left-middle field reveals rhonchi. Rhonchi present.       Abdominal:      General: Bowel sounds are normal.      Palpations: Abdomen is soft.   Musculoskeletal:         General: Normal range of motion.   Skin:     Findings: No rash.   Neurological:      General: No focal deficit present.      Mental Status: She is alert and oriented to person, place, and time. Mental status is at baseline.   Psychiatric:         Mood and Affect: Mood normal.         Behavior: Behavior normal.          Assessment and Plan  (including Health Maintenance)      Problem List  Smart Sets  Document Outside HM   :    Plan:         Health Maintenance Due   Topic Date Due    Mammogram  03/10/2021    Diabetes Urine Screening  06/01/2022       Problem List Items Addressed This Visit        Cardiac/Vascular    Essential hypertension (Chronic)    Current Assessment & Plan      - Goal blood pressure for most patients is less than 130/85. Both numbers are important. If you have questions about your specific goal blood pressure, please ask at your clinic visits.   - Check blood pressure outside of clinic, record the numbers, and bring the log to all your office visits.   - Take a daily, 81mg Aspirin, unless instructed to take a different dose for another medical purpose. Also do not take Aspirin if you have a history of adverse reaction to Aspirin.   - If you have any chest pain, SOB, or other concerning symptoms, report these immediately, or go to the nearest ER.    - Recommend cardiovascular exercise, at least 3 times per week, for at least 15 minutes.             Hyperlipidemia (Chronic)       Endocrine    Type 2 diabetes mellitus without complication, without long-term current use of insulin (Chronic)    Current Assessment & Plan      - Check glucose outside of clinic, record numbers, and bring log to follow up visit.    - Take medications as directed, and bring all medications to every clinic appointment.    - Eat a diabetic diet, if there are concerns about what that entails, we have a diabetic educator in our clinic.    - Cardiovascular exercise at least 3 times per week, for at least 15 minutes.    - Patient should be on a Statin medication, unless patient cannot tolerate a Statin.    - Aspirin should be taken everyday,  81mg, unless a higher dose is indicated for other medicaiton conditions. Also do not recommend Aspirin for a patient with known adverse effects from Aspirin.    - Recommend yearly, dilated eye exams for all Diabetic  Patients.    - Monitor feet for calluses, abrasion, or other abnormalities. Report any concerns at every clinic visit.    -   Hemoglobin A1C   Date Value Ref Range Status   01/17/2022 8.9 (H) 4.5 - 6.6 % Final     Comment:       Normal:               <5.7%  Pre-Diabetic:       5.7% to 6.4%  Diabetic:             >6.4%  Diabetic Goal:     <7%                 Relevant Orders    Hemoglobin A1C      Other Visit Diagnoses     Upper respiratory tract infection, unspecified type    -  Primary    Fever, unspecified fever cause        Relevant Orders    POCT Influenza A/B (Completed)    Sneezing with watery eyes        Relevant Orders    POCT Influenza A/B (Completed)    Post-nasal drip        Relevant Orders    POCT Influenza A/B (Completed)    Acute nonintractable headache, unspecified headache type        Relevant Orders    POCT Influenza A/B (Completed)        Upper respiratory tract infection, unspecified type    Fever, unspecified fever cause  -     POCT Influenza A/B    Sneezing with watery eyes  -     POCT Influenza A/B    Post-nasal drip  -     POCT Influenza A/B    Acute nonintractable headache, unspecified headache type  -     POCT Influenza A/B    Type 2 diabetes mellitus without complication, without long-term current use of insulin  -     Hemoglobin A1C; Future; Expected date: 04/25/2022    Essential hypertension    Mixed hyperlipidemia    Other orders  -     cefTRIAXone injection 1 g  -     azithromycin (Z-GWEN) 250 MG tablet; Take 2 tablets by mouth on day 1; Take 1 tablet by mouth on days 2-5  Dispense: 6 tablet; Refill: 0  -     dexbrompheniramine-phenylep-DM (POLYTUSSIN DM,DEXBROMPHENIRMN,) 2-7.5-15 mg/5 mL Liqd; Take 5 mLs by mouth 3 (three) times daily as needed (for COUGH and CONGESTION).  Dispense: 120 mL; Refill: 0  -     dexamethasone injection 2 mg       Health Maintenance Topics with due status: Not Due       Topic Last Completion Date    Colorectal Cancer Screening 02/04/2020    DEXA Scan  12/09/2020    Lipid Panel 03/25/2022    Eye Exam 04/20/2022    Low Dose Statin 04/25/2022    Foot Exam 04/25/2022       Procedures     Future Appointments   Date Time Provider Department Center   4/29/2022  1:00 PM [facility] MAMMO1 [facility] MAMMO [facility]    4/29/2022  1:30 PM [facility]   6/29/2022  9:15 AM Rebekah Garcia MD [facility]   7/21/2022  9:00 AM INFUSION, [facility] INFUSN [facility]   2/3/2023  3:00 PM AWV NURSE, [facility] FAMILY MEDICINE [facility] LON [facility]        Follow up in about 3 months (around 7/25/2022) for chronic health problems.     Signature:  Salty Barahona MD  [address] [place] [address]  Phone #: [phone]  Fax #: [phone]    Date of encounter: 4/25/22    There are no Patient Instructions on file for this visit.

## 2022-04-26 ENCOUNTER — PATIENT MESSAGE (OUTPATIENT)
Dept: FAMILY MEDICINE | Facility: CLINIC | Age: 77
End: 2022-04-26
Payer: MEDICARE

## 2022-04-26 DIAGNOSIS — E11.9 TYPE 2 DIABETES MELLITUS WITHOUT COMPLICATION, WITHOUT LONG-TERM CURRENT USE OF INSULIN: Primary | ICD-10-CM

## 2022-04-26 RX ORDER — METFORMIN HYDROCHLORIDE EXTENDED-RELEASE TABLETS 500 MG/1
500 TABLET, FILM COATED, EXTENDED RELEASE ORAL
Qty: 90 TABLET | Refills: 1 | Status: SHIPPED | OUTPATIENT
Start: 2022-04-26 | End: 2022-04-28

## 2022-04-26 RX ORDER — METFORMIN HYDROCHLORIDE EXTENDED-RELEASE TABLETS 500 MG/1
500 TABLET, FILM COATED, EXTENDED RELEASE ORAL
COMMUNITY
End: 2022-04-26 | Stop reason: SDUPTHER

## 2022-04-26 NOTE — TELEPHONE ENCOUNTER
----- Message from Salty Barahona MD sent at 4/26/2022  8:00 AM CDT -----  Diabetes remains higher than ideal, I do not see where she is on any diabetic medications, likely should start metformin XR 500mg daily

## 2022-04-28 DIAGNOSIS — I10 ESSENTIAL HYPERTENSION: Chronic | ICD-10-CM

## 2022-04-28 DIAGNOSIS — E11.9 TYPE 2 DIABETES MELLITUS WITHOUT COMPLICATION, WITHOUT LONG-TERM CURRENT USE OF INSULIN: Primary | ICD-10-CM

## 2022-04-28 RX ORDER — METFORMIN HYDROCHLORIDE 500 MG/1
500 TABLET ORAL 2 TIMES DAILY WITH MEALS
COMMUNITY
End: 2022-04-28 | Stop reason: SDUPTHER

## 2022-04-28 RX ORDER — METFORMIN HYDROCHLORIDE 500 MG/1
500 TABLET ORAL 2 TIMES DAILY WITH MEALS
Qty: 180 TABLET | Refills: 1 | Status: SHIPPED | OUTPATIENT
Start: 2022-04-28 | End: 2022-05-13

## 2022-04-29 ENCOUNTER — HOSPITAL ENCOUNTER (OUTPATIENT)
Dept: RADIOLOGY | Facility: HOSPITAL | Age: 77
Discharge: HOME | End: 2022-04-29
Attending: FAMILY MEDICINE
Payer: MEDICARE

## 2022-04-29 VITALS — BODY MASS INDEX: 36.29 KG/M2 | HEIGHT: 59 IN | WEIGHT: 180 LBS

## 2022-04-29 DIAGNOSIS — Z12.31 ENCOUNTER FOR SCREENING MAMMOGRAM FOR MALIGNANT NEOPLASM OF BREAST: ICD-10-CM

## 2022-04-29 DIAGNOSIS — Z12.39 ENCOUNTER FOR SCREENING FOR MALIGNANT NEOPLASM OF BREAST, UNSPECIFIED SCREENING MODALITY: ICD-10-CM

## 2022-04-29 DIAGNOSIS — Z78.0 ASYMPTOMATIC MENOPAUSE: ICD-10-CM

## 2022-04-29 PROCEDURE — 77067 SCR MAMMO BI INCL CAD: CPT | Mod: TC

## 2022-04-29 PROCEDURE — 77067 MAMMO DIGITAL SCREENING BILAT: ICD-10-PCS | Mod: 26,,, | Performed by: RADIOLOGY

## 2022-04-29 PROCEDURE — 77080 DXA BONE DENSITY AXIAL: CPT | Mod: TC

## 2022-04-29 PROCEDURE — 77067 SCR MAMMO BI INCL CAD: CPT | Mod: 26,,, | Performed by: RADIOLOGY

## 2022-05-13 DIAGNOSIS — E11.9 TYPE 2 DIABETES MELLITUS WITHOUT COMPLICATION, WITHOUT LONG-TERM CURRENT USE OF INSULIN: Primary | ICD-10-CM

## 2022-05-13 RX ORDER — METFORMIN HYDROCHLORIDE 500 MG/1
500 TABLET, EXTENDED RELEASE ORAL
COMMUNITY
End: 2022-05-13

## 2022-05-13 RX ORDER — METFORMIN HYDROCHLORIDE 500 MG/1
500 TABLET, EXTENDED RELEASE ORAL
Qty: 90 TABLET | Refills: 1 | Status: SHIPPED | OUTPATIENT
Start: 2022-05-13 | End: 2022-07-18 | Stop reason: SDUPTHER

## 2022-05-13 NOTE — TELEPHONE ENCOUNTER
----- Message from Salty Barahona MD sent at 5/13/2022  9:35 AM CDT -----  Regarding: RE: letter she brought in  Yes, start her on metformin Glucophage ER 500mg daily. Insurance company is saying we sent in a namebrand extended release metformin. All I want for her to have is generic extended release metformin.       ----- Message -----  From: Darleen Webster LPN  Sent: 5/13/2022   9:17 AM CDT  To: Salty Barahona MD  Subject: FW: letter she brought in                          ----- Message -----  From: Mendy Warren  Sent: 5/13/2022   9:07 AM CDT  To: Jun PELLETIER Staff  Subject: letter she brought in                            Patient came in to clinic stating that she had dropped off a letter regarding her insurance not going to pay for her metformin and she needs to know what she needs to do they wanted her to be put on something different please advise call back # [phone]

## 2022-06-29 ENCOUNTER — OFFICE VISIT (OUTPATIENT)
Dept: DERMATOLOGY | Facility: CLINIC | Age: 77
End: 2022-06-29
Payer: MEDICARE

## 2022-06-29 VITALS — WEIGHT: 180 LBS | HEIGHT: 59 IN | RESPIRATION RATE: 18 BRPM | BODY MASS INDEX: 36.29 KG/M2

## 2022-06-29 DIAGNOSIS — Z79.899 HIGH RISK MEDICATION USE: ICD-10-CM

## 2022-06-29 DIAGNOSIS — L40.0 PLAQUE PSORIASIS: Primary | Chronic | ICD-10-CM

## 2022-06-29 DIAGNOSIS — L40.9 PSORIASIS OF NAIL: ICD-10-CM

## 2022-06-29 PROCEDURE — 99214 OFFICE O/P EST MOD 30 MIN: CPT | Mod: ,,, | Performed by: DERMATOLOGY

## 2022-06-29 PROCEDURE — 99214 PR OFFICE/OUTPT VISIT, EST, LEVL IV, 30-39 MIN: ICD-10-PCS | Mod: ,,, | Performed by: DERMATOLOGY

## 2022-06-29 RX ORDER — AZITHROMYCIN 250 MG/1
TABLET, FILM COATED ORAL
COMMUNITY
Start: 2021-12-21 | End: 2022-08-02

## 2022-06-29 RX ORDER — CIPROFLOXACIN 250 MG/1
TABLET, FILM COATED ORAL
COMMUNITY
Start: 2021-09-03 | End: 2022-12-27

## 2022-06-29 RX ORDER — LANOLIN ALCOHOL/MO/W.PET/CERES
1000 CREAM (GRAM) TOPICAL
COMMUNITY

## 2022-06-29 RX ORDER — ACITRETIN 25 MG/1
CAPSULE ORAL
Qty: 30 CAPSULE | Refills: 5 | Status: SHIPPED | OUTPATIENT
Start: 2022-06-29 | End: 2022-07-11 | Stop reason: SDUPTHER

## 2022-06-29 RX ORDER — MOXIFLOXACIN 5 MG/ML
SOLUTION/ DROPS OPHTHALMIC
COMMUNITY
Start: 2022-02-21 | End: 2023-09-25

## 2022-06-29 RX ORDER — LEUCOVORIN CALCIUM 25 MG/1
25 TABLET ORAL
COMMUNITY
End: 2023-09-25

## 2022-06-29 RX ORDER — DOXYCYCLINE 100 MG/1
CAPSULE ORAL
COMMUNITY
Start: 2021-12-22 | End: 2022-12-27

## 2022-06-29 RX ORDER — CALCIUM CARBONATE 600 MG
600 TABLET ORAL
COMMUNITY
End: 2023-09-25

## 2022-06-29 NOTE — PROGRESS NOTES
[facility]   Rebekah Garcia MD    Patient Name: Dayanara Monae  Patient YOB: 1945   Date of Service: 6/29/22    CC: Follow-up Psoriasis     HPI: Dayanara Monae is a 76 y.o. female here today for follow-up of psoriasis , last seen 3/24/22.  Previous treatments include Acitretin.  Overall, the psoriasis is improved.  Treatment plan was followed as directed.    Past Medical History:   Diagnosis Date    Actinic keratoses     Atrial fibrillation     CVA (cerebral vascular accident)     Diabetes mellitus     Epilepsy     HTN (hypertension)     Non-melanoma skin cancer     Skin cancer      Past Surgical History:   Procedure Laterality Date    APPENDECTOMY      carpel tunnel Right     CHOLECYSTECTOMY      EYE SURGERY      HAND SURGERY      HYSTERECTOMY      JOINT REPLACEMENT       Review of patient's allergies indicates:   Allergen Reactions    Vaseline carbolated petroleum Blisters    Adhesive tape-silicones     Bactrim [sulfamethoxazole-trimethoprim]        Current Outpatient Medications:     azithromycin (Z-GWEN) 250 MG tablet, , Disp: , Rfl:     ciprofloxacin HCl (CIPRO) 250 MG tablet, , Disp: , Rfl:     doxycycline (VIBRAMYCIN) 100 MG Cap, , Disp: , Rfl:     moxifloxacin (VIGAMOX) 0.5 % ophthalmic solution, , Disp: , Rfl:     acitretin (SORIATANE) 25 MG capsule, Take one 25mg capsule PO daily, Disp: 30 capsule, Rfl: 5    albuterol (VENTOLIN HFA) 90 mcg/actuation inhaler, Inhale 2 puffs into the lungs every 6 (six) hours as needed for Wheezing or Shortness of Breath (cough)., Disp: 18 g, Rfl: 0    aspirin (ECOTRIN) 81 MG EC tablet, Take 81 mg by mouth once daily., Disp: , Rfl:     calcium carbonate (OS-FATOU) 600 mg calcium (1,500 mg) Tab, 1 tablet, Disp: , Rfl:     calcium carbonate (OS-FATOU) 600 mg calcium (1,500 mg) Tab, Take 600 mg by mouth., Disp: , Rfl:     citalopram (CELEXA) 10 MG tablet, Take 1 tablet (10 mg total) by mouth once daily., Disp: 90 tablet, Rfl:  1    cyanocobalamin (VITAMIN B-12) 1000 MCG tablet, Take 1,000 mcg by mouth., Disp: , Rfl:     dexbrompheniramine-phenylep-DM (POLYTUSSIN DM,DEXBROMPHENIRMN,) 2-7.5-15 mg/5 mL Liqd, Take 5 mLs by mouth 3 (three) times daily as needed (for COUGH and CONGESTION)., Disp: 120 mL, Rfl: 0    donepeziL (ARICEPT) 5 MG tablet, Take 1 tablet (5 mg total) by mouth once daily., Disp: 90 tablet, Rfl: 1    DULoxetine (CYMBALTA) 30 MG capsule, TAKE 1 CAPSULE BY MOUTH ONCE DAILY FOR  JOINT  PAIN, Disp: 90 capsule, Rfl: 1    hydroCHLOROthiazide (HYDRODIURIL) 25 MG tablet, Take 1 tablet (25 mg total) by mouth once daily. For BLOOD PRESSURE, Disp: 90 tablet, Rfl: 1    latanoprost 0.005 % ophthalmic solution, INSTILL 1 DROP INTO EACH EYE AT BEDTIME AS DIRECTED, Disp: , Rfl:     latanoprost 0.005 % ophthalmic solution, 1 drop into affected eye in the evening, Disp: 2.5 mL, Rfl: 1    leucovorin (WELLCOVORIN) 25 MG Tab, Take 25 mg by mouth., Disp: , Rfl:     metFORMIN (GLUCOPHAGE-XR) 500 MG ER 24hr tablet, Take 1 tablet (500 mg total) by mouth daily with breakfast., Disp: 90 tablet, Rfl: 1    metoprolol tartrate (LOPRESSOR) 25 MG tablet, Take 1 tablet (25 mg total) by mouth 2 (two) times daily. For HEART, Disp: 180 tablet, Rfl: 1    omeprazole (PRILOSEC) 40 MG capsule, Take 1 capsule (40 mg total) by mouth once daily. For STOMACH, Disp: 90 capsule, Rfl: 1    potassium chloride (K-TAB) 20 mEq, 1 tablet with food, Disp: 90 tablet, Rfl: 1    rosuvastatin (CRESTOR) 20 MG tablet, 1 tablet, Disp: 90 tablet, Rfl: 1    valACYclovir (VALTREX) 1000 MG tablet, Take 2 tablets (2,000 mg total) by mouth every 12 (twelve) hours. for 1 day, Disp: 4 tablet, Rfl: 0    ROS: A focused review of systems was obtained and negative.     Exam: A focused skin exam was performed. All areas examined were normal except as mentioned in the assessment and plan below.  General Appearance of the patient is well developed and well nourished.  Orientation:  alert and oriented x 3.  Mood and affect: pleasant.    Assessment:   The primary encounter diagnosis was Plaque psoriasis. Diagnoses of Psoriasis of nail and High risk medication use were also pertinent to this visit.    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  Expectations: Psoriasis is chronic in nature with periods of remissions and flares. Flares can be triggered by stress, infections (group A strep), certain medications and alcohol.  Contact office if: Psoriasis worsens, or fails to improve despite several months of treatment.    - continue acitretin daily  - will monitor nail psoriasis for progression and consider referral to rheumatology (associated with joint disease) and change in therapy if needed     High Risk Medication Monitoring (Z79.899) : The risks and benefits of the medication were reviewed in full with the patient. Should any side effects occur, the patient will stop the medication and contact me immediately.    - will order CBC, CMP and fasting lipids    Soriatane Counseling: I discussed with the patient the risks of acitretin including but not limited to hair loss, dry  lips/skin/eyes, liver damage, hyperlipidemia, depression/suicidal ideation, photosensitivity. Serious rare side  effects can include but are not limited to pancreatitis, pseudotumor cerebri, bony changes, clot  formation/stroke/heart attack. Patient understands that alcohol is contraindicated since it can result in liver toxicity  and significantly prolong the elimination of the drug by many years. Patient  understands that this drug is not  meant for any woman of childbearing age or who plans to get pregnant since it is teratogenic and has a long drug  elimination half life.    Follow up in about 6 months (around 12/29/2022) for Psoriasis .    Rebekah Garcia MD

## 2022-07-11 DIAGNOSIS — L40.0 PLAQUE PSORIASIS: Chronic | ICD-10-CM

## 2022-07-11 RX ORDER — ACITRETIN 25 MG/1
CAPSULE ORAL
Qty: 30 CAPSULE | Refills: 5 | Status: SHIPPED | OUTPATIENT
Start: 2022-07-11 | End: 2022-07-18 | Stop reason: SDUPTHER

## 2022-07-11 NOTE — TELEPHONE ENCOUNTER
Spoke with [facility] in [place] and they stated their system has been updating and they did not receive acitretin. Will resend prescription.

## 2022-07-15 ENCOUNTER — OFFICE VISIT (OUTPATIENT)
Dept: FAMILY MEDICINE | Facility: CLINIC | Age: 77
End: 2022-07-15
Payer: MEDICARE

## 2022-07-15 VITALS
DIASTOLIC BLOOD PRESSURE: 75 MMHG | BODY MASS INDEX: 35.96 KG/M2 | WEIGHT: 178.38 LBS | HEIGHT: 59 IN | HEART RATE: 80 BPM | TEMPERATURE: 98 F | OXYGEN SATURATION: 98 % | RESPIRATION RATE: 18 BRPM | SYSTOLIC BLOOD PRESSURE: 136 MMHG

## 2022-07-15 DIAGNOSIS — I10 ESSENTIAL HYPERTENSION: Primary | Chronic | ICD-10-CM

## 2022-07-15 DIAGNOSIS — R51.9 WORSENING HEADACHES: Chronic | ICD-10-CM

## 2022-07-15 DIAGNOSIS — E78.2 MIXED HYPERLIPIDEMIA: Chronic | ICD-10-CM

## 2022-07-15 DIAGNOSIS — K21.9 GASTROESOPHAGEAL REFLUX DISEASE WITHOUT ESOPHAGITIS: Chronic | ICD-10-CM

## 2022-07-15 DIAGNOSIS — L40.0 PLAQUE PSORIASIS: Chronic | ICD-10-CM

## 2022-07-15 DIAGNOSIS — F03.91 DEMENTIA WITH BEHAVIORAL DISTURBANCE, UNSPECIFIED DEMENTIA TYPE: Chronic | ICD-10-CM

## 2022-07-15 DIAGNOSIS — E11.9 TYPE 2 DIABETES MELLITUS WITHOUT COMPLICATION, WITHOUT LONG-TERM CURRENT USE OF INSULIN: Chronic | ICD-10-CM

## 2022-07-15 DIAGNOSIS — M81.0 AGE-RELATED OSTEOPOROSIS WITHOUT CURRENT PATHOLOGICAL FRACTURE: Chronic | ICD-10-CM

## 2022-07-15 PROBLEM — F03.918 DEMENTIA WITH BEHAVIORAL DISTURBANCE: Chronic | Status: ACTIVE | Noted: 2022-07-15

## 2022-07-15 PROCEDURE — 99214 PR OFFICE/OUTPT VISIT, EST, LEVL IV, 30-39 MIN: ICD-10-PCS | Mod: ,,, | Performed by: FAMILY MEDICINE

## 2022-07-15 PROCEDURE — 99214 OFFICE O/P EST MOD 30 MIN: CPT | Mod: ,,, | Performed by: FAMILY MEDICINE

## 2022-07-15 RX ORDER — CITALOPRAM 20 MG/1
TABLET, FILM COATED ORAL
COMMUNITY
Start: 2022-05-10 | End: 2022-07-18 | Stop reason: SDUPTHER

## 2022-07-15 NOTE — PROGRESS NOTES
Salty Barahona MD    [address] [place] [address]     PATIENT NAME: Dayanara Monae  : 1945  DATE: 7/15/22  MRN: [id number]      Billing Provider: Salty Barahona MD  Level of Service:   Patient PCP Information     Provider PCP Type    Salty Barahona MD General          Reason for Visit / Chief Complaint: Follow-up (HTN ) and Medication Refill       Update PCP  Update Chief Complaint         History of Present Illness / Problem Focused Workflow     Dayanara Monae presents to the clinic with Follow-up (HTN ) and Medication Refill     She did not bring her medication in for review.     Needs refills, but not sure what she needs refills on     She worries she is on too much medication    HPI    Review of Systems     Review of Systems   Constitutional: Negative for activity change, appetite change, chills, fatigue and fever.   HENT: Negative for nasal congestion, ear pain, hearing loss, postnasal drip and sore throat.    Respiratory: Negative for cough, chest tightness, shortness of breath and wheezing.    Cardiovascular: Negative for chest pain, palpitations, leg swelling and claudication.   Gastrointestinal: Negative for abdominal pain, change in bowel habit, constipation, diarrhea, nausea, vomiting and change in bowel habit.   Genitourinary: Negative for dysuria.   Musculoskeletal: Negative for arthralgias, back pain, gait problem and myalgias.   Integumentary:  Negative for rash.   Neurological: Negative for weakness and headaches.   Psychiatric/Behavioral: Negative for suicidal ideas. The patient is not nervous/anxious.         Medical / Social / Family History     Past Medical History:   Diagnosis Date    Actinic keratoses     Atrial fibrillation     CVA (cerebral vascular accident)     Diabetes mellitus     Epilepsy     HTN (hypertension)     Non-melanoma skin cancer     Skin cancer        Past Surgical History:   Procedure  Laterality Date    APPENDECTOMY      carpel tunnel Right     CHOLECYSTECTOMY      EYE SURGERY      HAND SURGERY      HYSTERECTOMY      JOINT REPLACEMENT         Social History  Ms.  reports that she has never smoked. She has never used smokeless tobacco. She reports that she does not drink alcohol and does not use drugs.    Family History  Ms.'s family history includes Diabetes in her sister; Hypertension in her mother; Stroke in her father.    Medications and Allergies     Medications  No outpatient medications have been marked as taking for the 7/15/22 encounter (Office Visit) with Salty Barahona MD.       Allergies  Review of patient's allergies indicates:   Allergen Reactions    Vaseline carbolated petroleum Blisters    Adhesive tape-silicones     Bactrim [sulfamethoxazole-trimethoprim]        Physical Examination     Vitals:    07/15/22 1418   BP: 136/75   Pulse: 80   Resp: 18   Temp: 98.1 °F (36.7 °C)     Physical Exam  Vitals and nursing note reviewed.   Constitutional:       General: She is not in acute distress.     Appearance: Normal appearance. She is not ill-appearing.   Eyes:      Extraocular Movements: Extraocular movements intact.      Pupils: Pupils are equal, round, and reactive to light.   Cardiovascular:      Rate and Rhythm: Normal rate and regular rhythm.      Heart sounds: Normal heart sounds.   Pulmonary:      Effort: Pulmonary effort is normal.      Breath sounds: Normal breath sounds.   Abdominal:      General: Bowel sounds are normal.      Palpations: Abdomen is soft.   Musculoskeletal:         General: Normal range of motion.   Skin:     Findings: No rash.   Neurological:      General: No focal deficit present.      Mental Status: She is alert and oriented to person, place, and time. Mental status is at baseline.   Psychiatric:         Mood and Affect: Mood normal.         Behavior: Behavior normal.          Assessment and Plan (including Health Maintenance)      Problem  List  Smart Sets  Document Outside HM   :    Plan:         Health Maintenance Due   Topic Date Due    COVID-19 Vaccine (4 - Booster) 06/04/2022    Diabetes Urine Screening  06/01/2022    Hemoglobin A1c  07/25/2022       Problem List Items Addressed This Visit        Neuro    Worsening headaches (Chronic)    Current Assessment & Plan     She is seeing Dr. Rut Urena           Dementia with behavioral disturbance (Chronic)    Current Assessment & Plan     She is seeing Dr. Rut Urena.     Hallucinations are better with her medication               Derm    Plaque psoriasis (Chronic)    Overview     acitretin and duobrii              Cardiac/Vascular    Essential hypertension - Primary (Chronic)    Current Assessment & Plan      - Goal blood pressure for most patients is less than 130/85. Both numbers are important. If you have questions about your specific goal blood pressure, please ask at your clinic visits.   - Check blood pressure outside of clinic, record the numbers, and bring the log to all your office visits.   - If you have any chest pain, SOB, or other concerning symptoms, report these immediately, or go to the nearest ER.    - Recommend cardiovascular exercise, at least 3 times per week, for at least 15 minutes.   - Eat a heart healthy diet.              Hyperlipidemia (Chronic)       Endocrine    Type 2 diabetes mellitus without complication, without long-term current use of insulin (Chronic)    Current Assessment & Plan      - Check glucose outside of clinic, record numbers, and bring log to follow up visit.    - Take medications as directed, and bring all medications to every clinic appointment.    - Eat a diabetic diet, if there are concerns about what that entails, we have a diabetic educator in our clinic.    - Cardiovascular exercise at least 3 times per week, for at least 15 minutes.    - Patient should be on a Statin medication, unless patient cannot tolerate a Statin.    - Aspirin  should be taken everyday,  81mg, unless a higher dose is indicated for other medicaiton conditions. Also do not recommend Aspirin for a patient with known adverse effects from Aspirin.    - Recommend yearly, dilated eye exams for all Diabetic Patients.    - Monitor feet for calluses, abrasion, or other abnormalities. Report any concerns at every clinic visit.    -   Hemoglobin A1C   Date Value Ref Range Status   04/25/2022 7.9 (H) 4.5 - 6.6 % Final     Comment:       Normal:               <5.7%  Pre-Diabetic:       5.7% to 6.4%  Diabetic:             >6.4%  Diabetic Goal:     <7%   01/17/2022 8.9 (H) 4.5 - 6.6 % Final     Comment:       Normal:               <5.7%  Pre-Diabetic:       5.7% to 6.4%  Diabetic:             >6.4%  Diabetic Goal:     <7%                    GI    Gastroesophageal reflux disease (Chronic)       Orthopedic    Age-related osteoporosis without current pathological fracture (Chronic)        Essential hypertension    Mixed hyperlipidemia    Plaque psoriasis    Type 2 diabetes mellitus without complication, without long-term current use of insulin    Gastroesophageal reflux disease without esophagitis    Age-related osteoporosis without current pathological fracture    Dementia with behavioral disturbance, unspecified dementia type    Worsening headaches       Health Maintenance Topics with due status: Not Due       Topic Last Completion Date    Colorectal Cancer Screening 02/04/2020    Influenza Vaccine 01/31/2022    Eye Exam 04/20/2022    Foot Exam 04/25/2022    Mammogram 04/29/2022    DEXA Scan 04/29/2022    Lipid Panel 06/30/2022    Low Dose Statin 07/18/2022       Procedures     Future Appointments   Date Time Provider Department Center   7/21/2022  9:00 AM INFUSION, [facility] INFUSN [place]   1/4/2023  9:15 AM Rebekah Gracia MD RCFD DERM [place]   2/3/2023  3:00 PM AWV NURSE, [facility] FAMILY MEDICINE [facility] [place]        Follow up in about 3 months (around  10/15/2022) for chronic health problems.     Signature:  Salty Barahona MD  [address] [place] [address]  Phone #: [phone]  Fax #: [phone]    Date of encounter: 7/15/22    There are no Patient Instructions on file for this visit.

## 2022-07-18 ENCOUNTER — CLINICAL SUPPORT (OUTPATIENT)
Dept: FAMILY MEDICINE | Facility: CLINIC | Age: 77
End: 2022-07-18
Payer: MEDICARE

## 2022-07-18 DIAGNOSIS — K21.9 GASTROESOPHAGEAL REFLUX DISEASE, UNSPECIFIED WHETHER ESOPHAGITIS PRESENT: Chronic | ICD-10-CM

## 2022-07-18 DIAGNOSIS — F03.91 DEMENTIA WITH BEHAVIORAL DISTURBANCE, UNSPECIFIED DEMENTIA TYPE: ICD-10-CM

## 2022-07-18 DIAGNOSIS — E78.2 MIXED HYPERLIPIDEMIA: ICD-10-CM

## 2022-07-18 DIAGNOSIS — M54.2 NECK PAIN: ICD-10-CM

## 2022-07-18 DIAGNOSIS — I10 ESSENTIAL HYPERTENSION: Chronic | ICD-10-CM

## 2022-07-18 DIAGNOSIS — F32.89 OTHER DEPRESSION: ICD-10-CM

## 2022-07-18 DIAGNOSIS — E87.6 LOW BLOOD POTASSIUM: Primary | ICD-10-CM

## 2022-07-18 DIAGNOSIS — E11.9 TYPE 2 DIABETES MELLITUS WITHOUT COMPLICATION, WITHOUT LONG-TERM CURRENT USE OF INSULIN: ICD-10-CM

## 2022-07-18 DIAGNOSIS — L40.0 PLAQUE PSORIASIS: Chronic | ICD-10-CM

## 2022-07-18 RX ORDER — ROSUVASTATIN CALCIUM 20 MG/1
20 TABLET, COATED ORAL NIGHTLY
Qty: 90 TABLET | Refills: 1 | Status: SHIPPED | OUTPATIENT
Start: 2022-07-18 | End: 2023-02-03 | Stop reason: SDUPTHER

## 2022-07-18 RX ORDER — METOPROLOL TARTRATE 25 MG/1
25 TABLET, FILM COATED ORAL 2 TIMES DAILY
Qty: 180 TABLET | Refills: 1 | Status: SHIPPED | OUTPATIENT
Start: 2022-07-18 | End: 2023-12-15 | Stop reason: SDUPTHER

## 2022-07-18 RX ORDER — HYDROCHLOROTHIAZIDE 25 MG/1
25 TABLET ORAL DAILY
Qty: 90 TABLET | Refills: 1 | Status: SHIPPED | OUTPATIENT
Start: 2022-07-18 | End: 2023-02-03 | Stop reason: SDUPTHER

## 2022-07-18 RX ORDER — DULOXETIN HYDROCHLORIDE 30 MG/1
CAPSULE, DELAYED RELEASE ORAL
Qty: 90 CAPSULE | Refills: 1 | Status: SHIPPED | OUTPATIENT
Start: 2022-07-18 | End: 2023-02-03 | Stop reason: SDUPTHER

## 2022-07-18 RX ORDER — CITALOPRAM 20 MG/1
20 TABLET, FILM COATED ORAL DAILY
Qty: 90 TABLET | Refills: 1 | Status: SHIPPED | OUTPATIENT
Start: 2022-07-18 | End: 2023-02-03 | Stop reason: SDUPTHER

## 2022-07-18 RX ORDER — ACITRETIN 25 MG/1
CAPSULE ORAL
Qty: 90 CAPSULE | Refills: 1 | Status: SHIPPED | OUTPATIENT
Start: 2022-07-18 | End: 2023-01-04 | Stop reason: SDUPTHER

## 2022-07-18 RX ORDER — OMEPRAZOLE 40 MG/1
40 CAPSULE, DELAYED RELEASE ORAL DAILY
Qty: 90 CAPSULE | Refills: 1 | Status: SHIPPED | OUTPATIENT
Start: 2022-07-18 | End: 2023-01-19 | Stop reason: SDUPTHER

## 2022-07-18 RX ORDER — METFORMIN HYDROCHLORIDE 500 MG/1
500 TABLET, EXTENDED RELEASE ORAL 2 TIMES DAILY WITH MEALS
Qty: 180 TABLET | Refills: 1 | Status: SHIPPED | OUTPATIENT
Start: 2022-07-18 | End: 2023-02-03 | Stop reason: SDUPTHER

## 2022-07-18 RX ORDER — DONEPEZIL HYDROCHLORIDE 5 MG/1
5 TABLET, FILM COATED ORAL DAILY
Qty: 90 TABLET | Refills: 1 | Status: SHIPPED | OUTPATIENT
Start: 2022-07-18 | End: 2023-02-03 | Stop reason: SDUPTHER

## 2022-07-18 RX ORDER — POTASSIUM CHLORIDE 1500 MG/1
20 TABLET, EXTENDED RELEASE ORAL DAILY
Qty: 90 TABLET | Refills: 1 | Status: SHIPPED | OUTPATIENT
Start: 2022-07-18 | End: 2023-01-19 | Stop reason: SDUPTHER

## 2022-07-21 ENCOUNTER — INFUSION (OUTPATIENT)
Dept: INFUSION THERAPY | Facility: HOSPITAL | Age: 77
End: 2022-07-21
Attending: FAMILY MEDICINE
Payer: MEDICARE

## 2022-07-21 VITALS
RESPIRATION RATE: 18 BRPM | BODY MASS INDEX: 35.48 KG/M2 | DIASTOLIC BLOOD PRESSURE: 51 MMHG | OXYGEN SATURATION: 97 % | WEIGHT: 176 LBS | TEMPERATURE: 98 F | SYSTOLIC BLOOD PRESSURE: 122 MMHG | HEIGHT: 59 IN | HEART RATE: 79 BPM

## 2022-07-21 DIAGNOSIS — M81.0 AGE-RELATED OSTEOPOROSIS WITHOUT CURRENT PATHOLOGICAL FRACTURE: Primary | ICD-10-CM

## 2022-07-21 PROCEDURE — 96372 THER/PROPH/DIAG INJ SC/IM: CPT

## 2022-07-21 PROCEDURE — 63600175 PHARM REV CODE 636 W HCPCS: Performed by: FAMILY MEDICINE

## 2022-07-21 RX ADMIN — DENOSUMAB 60 MG: 60 INJECTION SUBCUTANEOUS at 09:07

## 2022-07-21 NOTE — PROGRESS NOTES
0905  Patient in room.  Calcium level 3 weeks ago was 9.6  Patient stated she is taking her calcium with vitamin D.     0917 Administered Prolia 60mg SQ to left upper arm per protocol.  Patient tolerated well.      0937 Patient discharged to home ambulatory with no adverse reaction noted at this time.  Patient has appt to follow up in 6 months for next prolia injection.

## 2022-07-22 ENCOUNTER — OFFICE VISIT (OUTPATIENT)
Dept: FAMILY MEDICINE | Facility: CLINIC | Age: 77
End: 2022-07-22
Payer: MEDICARE

## 2022-07-22 VITALS
DIASTOLIC BLOOD PRESSURE: 73 MMHG | WEIGHT: 174 LBS | SYSTOLIC BLOOD PRESSURE: 118 MMHG | OXYGEN SATURATION: 98 % | HEART RATE: 104 BPM | BODY MASS INDEX: 35.08 KG/M2 | TEMPERATURE: 99 F | RESPIRATION RATE: 17 BRPM | HEIGHT: 59 IN

## 2022-07-22 DIAGNOSIS — U07.1 COVID-19: Primary | ICD-10-CM

## 2022-07-22 DIAGNOSIS — Z20.822 COUGH WITH EXPOSURE TO COVID-19 VIRUS: ICD-10-CM

## 2022-07-22 DIAGNOSIS — R05.8 COUGH WITH EXPOSURE TO COVID-19 VIRUS: ICD-10-CM

## 2022-07-22 LAB
CTP QC/QA: YES
FLUAV AG NPH QL: NEGATIVE
FLUBV AG NPH QL: NEGATIVE
SARS-COV-2 AG RESP QL IA.RAPID: POSITIVE

## 2022-07-22 PROCEDURE — 99213 OFFICE O/P EST LOW 20 MIN: CPT | Mod: CR,,, | Performed by: NURSE PRACTITIONER

## 2022-07-22 PROCEDURE — 99213 PR OFFICE/OUTPT VISIT, EST, LEVL III, 20-29 MIN: ICD-10-PCS | Mod: CR,,, | Performed by: NURSE PRACTITIONER

## 2022-07-22 PROCEDURE — 87428 SARSCOV & INF VIR A&B AG IA: CPT | Mod: RHCUB | Performed by: NURSE PRACTITIONER

## 2022-07-22 NOTE — PROGRESS NOTES
[facility] - FAMILY MEDICINE  Phone: [phone]       PATIENT NAME: Dayanara Monae   : 1945    AGE: 76 y.o. DATE: 2022    MRN: [id number]        Reason for Visit / Chief Complaint:  Sinus Problem (Patient is  here today for a sinus problems. Patient stated that her symptoms started on Tuesday. )     Subjective:     HPI:  76 yr old WF presents to the office for sinus congestion and drainage x 3 days     Review of Systems:    Pertinent items are above noted in HPI.    Review of patient's allergies indicates:   Allergen Reactions    Vaseline carbolated petroleum Blisters    Adhesive tape-silicones     Bactrim [sulfamethoxazole-trimethoprim]         Med List:  Current Outpatient Medications on File Prior to Visit   Medication Sig Dispense Refill    acitretin (SORIATANE) 25 MG capsule Take one 25mg capsule PO daily for Psoriasis 90 capsule 1    albuterol (VENTOLIN HFA) 90 mcg/actuation inhaler Inhale 2 puffs into the lungs every 6 (six) hours as needed for Wheezing or Shortness of Breath (cough). 18 g 0    aspirin (ECOTRIN) 81 MG EC tablet Take 81 mg by mouth once daily.      azithromycin (Z-GWEN) 250 MG tablet       calcium carbonate (OS-FATOU) 600 mg calcium (1,500 mg) Tab 1 tablet      calcium carbonate (OS-FATOU) 600 mg calcium (1,500 mg) Tab Take 600 mg by mouth.      ciprofloxacin HCl (CIPRO) 250 MG tablet       citalopram (CELEXA) 10 MG tablet Take 1 tablet (10 mg total) by mouth once daily. (Patient not taking: Reported on 2022) 90 tablet 1    citalopram (CELEXA) 20 MG tablet Take 1 tablet (20 mg total) by mouth once daily. For MOOD 90 tablet 1    cyanocobalamin (VITAMIN B-12) 1000 MCG tablet Take 1,000 mcg by mouth.      dexbrompheniramine-phenylep-DM (POLYTUSSIN DM,DEXBROMPHENIRMN,) 2-7.5-15 mg/5 mL Liqd Take 5 mLs by mouth 3 (three) times daily as needed (for COUGH and CONGESTION). (Patient not taking: Reported on 2022) 120 mL 0     donepeziL (ARICEPT) 5 MG tablet Take 1 tablet (5 mg total) by mouth once daily. For MEMORY 90 tablet 1    doxycycline (VIBRAMYCIN) 100 MG Cap       DULoxetine (CYMBALTA) 30 MG capsule TAKE 1 CAPSULE BY MOUTH ONCE DAILY FOR  JOINT  PAIN 90 capsule 1    hydroCHLOROthiazide (HYDRODIURIL) 25 MG tablet Take 1 tablet (25 mg total) by mouth once daily. For BLOOD PRESSURE 90 tablet 1    latanoprost 0.005 % ophthalmic solution INSTILL 1 DROP INTO EACH EYE AT BEDTIME AS DIRECTED      latanoprost 0.005 % ophthalmic solution 1 drop into affected eye in the evening 2.5 mL 1    leucovorin (WELLCOVORIN) 25 MG Tab Take 25 mg by mouth.      metFORMIN (GLUCOPHAGE-XR) 500 MG ER 24hr tablet Take 1 tablet (500 mg total) by mouth 2 (two) times daily with meals. For diabetes 180 tablet 1    metoprolol tartrate (LOPRESSOR) 25 MG tablet Take 1 tablet (25 mg total) by mouth 2 (two) times daily. For HEART 180 tablet 1    moxifloxacin (VIGAMOX) 0.5 % ophthalmic solution       omeprazole (PRILOSEC) 40 MG capsule Take 1 capsule (40 mg total) by mouth once daily. For STOMACH 90 capsule 1    potassium chloride (K-TAB) 20 mEq Take 1 tablet (20 mEq total) by mouth once daily at 6am. 90 tablet 1    rosuvastatin (CRESTOR) 20 MG tablet Take 1 tablet (20 mg total) by mouth every evening. For CHOLESTEROL 90 tablet 1    valACYclovir (VALTREX) 1000 MG tablet Take 2 tablets (2,000 mg total) by mouth every 12 (twelve) hours. for 1 day 4 tablet 0     No current facility-administered medications on file prior to visit.       Medical/Social/Family History:  Past Medical History:   Diagnosis Date    Actinic keratoses     Atrial fibrillation     CVA (cerebral vascular accident)     Diabetes mellitus     Epilepsy     HTN (hypertension)     Non-melanoma skin cancer     Skin cancer       Social History     Tobacco Use   Smoking Status Never Smoker   Smokeless Tobacco Never Used      Social History     Substance and Sexual Activity   Alcohol Use  "Never       Family History   Problem Relation Age of Onset    Hypertension Mother     Stroke Father     Diabetes Sister       Past Surgical History:   Procedure Laterality Date    APPENDECTOMY      carpel tunnel Right     CHOLECYSTECTOMY      EYE SURGERY      HAND SURGERY      HYSTERECTOMY      JOINT REPLACEMENT        Objective:      Vitals:    07/22/22 1015   BP: 118/73   BP Location: Right arm   Patient Position: Sitting   Pulse: 104   Resp: 17   Temp: 99 °F (37.2 °C)   SpO2: 98%   Weight: 78.9 kg (174 lb)   Height: 4' 11" (1.499 m)     Body mass index is 35.14 kg/m².     Physical Exam:    General: well developed, well nourished    Head: normocephalic, atraumatic    Eyes: conjunctivae/corneas clear. PERRL.    Mouth/ENT: postnasal drip notedmucous membranes moist, pharynx normal without lesions Uvula is midline.     Neck: supple, symmetrical, trachea midline, no adenopathy and thyroid: not enlarged, symmetric, no tenderness/mass/nodules    Respiratory: unlabored respirations, no intercostal retractions or accessory muscle use, clear to auscultation without rales or wheezes    Cardiovascular: normal rate and regular rhythm, S1 and S2 normal, no murmurs noted    Abdomen: soft, nontender    Musculoskeletal: negative; full range of motion, no tenderness, palpable spasm or pain on motion    Lymphadenopathy: No cervical or supraclavicular adenopathy    Neurological: normal without focal findings and mental status, speech normal, alert and oriented x3    Skin: Skin color, texture, turgor normal. No rashes or lesions    Psych: no auditory or visual hallucinations, no anxiety, no depression/worrying alot       Assessment:          ICD-10-CM ICD-9-CM   1. COVID-19  U07.1 079.89   2. Cough with exposure to COVID-19 virus  R05.8 786.2    Z20.822 V01.79        Plan:       COVID-19  -     pyrilamine-chlophedianoL 12.5-12.5 mg/5 mL Liqd; Take 10 mLs by mouth every 8 (eight) hours as needed (cough).  Dispense: 240 mL; " Refill: 0  -     nirmatrelvir-ritonavir 300 mg (150 mg x 2)-100 mg copackaged tablets (EUA); Take 3 tablets by mouth 2 (two) times daily for 5 days. Each dose contains 2 nirmatrelvir (pink tablets) and 1 ritonavir (white tablet). Take all 3 tablets together  Dispense: 30 tablet; Refill: 0    Cough with exposure to COVID-19 virus  -     POCT SARS-COV2 (COVID) with Flu Antigen        Current Outpatient Medications:     acitretin (SORIATANE) 25 MG capsule, Take one 25mg capsule PO daily for Psoriasis, Disp: 90 capsule, Rfl: 1    albuterol (VENTOLIN HFA) 90 mcg/actuation inhaler, Inhale 2 puffs into the lungs every 6 (six) hours as needed for Wheezing or Shortness of Breath (cough)., Disp: 18 g, Rfl: 0    aspirin (ECOTRIN) 81 MG EC tablet, Take 81 mg by mouth once daily., Disp: , Rfl:     azithromycin (Z-GWEN) 250 MG tablet, , Disp: , Rfl:     calcium carbonate (OS-FATOU) 600 mg calcium (1,500 mg) Tab, 1 tablet, Disp: , Rfl:     calcium carbonate (OS-FATOU) 600 mg calcium (1,500 mg) Tab, Take 600 mg by mouth., Disp: , Rfl:     ciprofloxacin HCl (CIPRO) 250 MG tablet, , Disp: , Rfl:     citalopram (CELEXA) 10 MG tablet, Take 1 tablet (10 mg total) by mouth once daily. (Patient not taking: Reported on 7/18/2022), Disp: 90 tablet, Rfl: 1    citalopram (CELEXA) 20 MG tablet, Take 1 tablet (20 mg total) by mouth once daily. For MOOD, Disp: 90 tablet, Rfl: 1    cyanocobalamin (VITAMIN B-12) 1000 MCG tablet, Take 1,000 mcg by mouth., Disp: , Rfl:     dexbrompheniramine-phenylep-DM (POLYTUSSIN DM,DEXBROMPHENIRMN,) 2-7.5-15 mg/5 mL Liqd, Take 5 mLs by mouth 3 (three) times daily as needed (for COUGH and CONGESTION). (Patient not taking: Reported on 7/18/2022), Disp: 120 mL, Rfl: 0    donepeziL (ARICEPT) 5 MG tablet, Take 1 tablet (5 mg total) by mouth once daily. For MEMORY, Disp: 90 tablet, Rfl: 1    doxycycline (VIBRAMYCIN) 100 MG Cap, , Disp: , Rfl:     DULoxetine (CYMBALTA) 30 MG capsule, TAKE 1 CAPSULE BY MOUTH  ONCE DAILY FOR  JOINT  PAIN, Disp: 90 capsule, Rfl: 1    hydroCHLOROthiazide (HYDRODIURIL) 25 MG tablet, Take 1 tablet (25 mg total) by mouth once daily. For BLOOD PRESSURE, Disp: 90 tablet, Rfl: 1    latanoprost 0.005 % ophthalmic solution, INSTILL 1 DROP INTO EACH EYE AT BEDTIME AS DIRECTED, Disp: , Rfl:     latanoprost 0.005 % ophthalmic solution, 1 drop into affected eye in the evening, Disp: 2.5 mL, Rfl: 1    leucovorin (WELLCOVORIN) 25 MG Tab, Take 25 mg by mouth., Disp: , Rfl:     metFORMIN (GLUCOPHAGE-XR) 500 MG ER 24hr tablet, Take 1 tablet (500 mg total) by mouth 2 (two) times daily with meals. For diabetes, Disp: 180 tablet, Rfl: 1    metoprolol tartrate (LOPRESSOR) 25 MG tablet, Take 1 tablet (25 mg total) by mouth 2 (two) times daily. For HEART, Disp: 180 tablet, Rfl: 1    moxifloxacin (VIGAMOX) 0.5 % ophthalmic solution, , Disp: , Rfl:     nirmatrelvir-ritonavir 300 mg (150 mg x 2)-100 mg copackaged tablets (EUA), Take 3 tablets by mouth 2 (two) times daily for 5 days. Each dose contains 2 nirmatrelvir (pink tablets) and 1 ritonavir (white tablet). Take all 3 tablets together, Disp: 30 tablet, Rfl: 0    omeprazole (PRILOSEC) 40 MG capsule, Take 1 capsule (40 mg total) by mouth once daily. For STOMACH, Disp: 90 capsule, Rfl: 1    potassium chloride (K-TAB) 20 mEq, Take 1 tablet (20 mEq total) by mouth once daily at 6am., Disp: 90 tablet, Rfl: 1    pyrilamine-chlophedianoL 12.5-12.5 mg/5 mL Liqd, Take 10 mLs by mouth every 8 (eight) hours as needed (cough)., Disp: 240 mL, Rfl: 0    rosuvastatin (CRESTOR) 20 MG tablet, Take 1 tablet (20 mg total) by mouth every evening. For CHOLESTEROL, Disp: 90 tablet, Rfl: 1    valACYclovir (VALTREX) 1000 MG tablet, Take 2 tablets (2,000 mg total) by mouth every 12 (twelve) hours. for 1 day, Disp: 4 tablet, Rfl: 0      New & refilled meds:  Requested Prescriptions     Signed Prescriptions Disp Refills    pyrilamine-chlophedianoL 12.5-12.5 mg/5 mL Liqd 240  mL 0     Sig: Take 10 mLs by mouth every 8 (eight) hours as needed (cough).    nirmatrelvir-ritonavir 300 mg (150 mg x 2)-100 mg copackaged tablets (EUA) 30 tablet 0     Sig: Take 3 tablets by mouth 2 (two) times daily for 5 days. Each dose contains 2 nirmatrelvir (pink tablets) and 1 ritonavir (white tablet). Take all 3 tablets together     Treatment Recommendations:    Orders and follow up as documented in patient record.  Rest, hydrate, tylenol otc prn, quarantine x 5 days  Return to clinic as needed.    Signature: YAMILA Webb

## 2022-08-02 ENCOUNTER — OFFICE VISIT (OUTPATIENT)
Dept: FAMILY MEDICINE | Facility: CLINIC | Age: 77
End: 2022-08-02
Payer: MEDICARE

## 2022-08-02 VITALS
SYSTOLIC BLOOD PRESSURE: 132 MMHG | RESPIRATION RATE: 16 BRPM | HEART RATE: 80 BPM | TEMPERATURE: 98 F | DIASTOLIC BLOOD PRESSURE: 82 MMHG | BODY MASS INDEX: 35 KG/M2 | HEIGHT: 59 IN | WEIGHT: 173.63 LBS | OXYGEN SATURATION: 97 %

## 2022-08-02 DIAGNOSIS — U07.1 COVID-19: Primary | ICD-10-CM

## 2022-08-02 DIAGNOSIS — J06.9 UPPER RESPIRATORY TRACT INFECTION, UNSPECIFIED TYPE: ICD-10-CM

## 2022-08-02 PROCEDURE — 99214 OFFICE O/P EST MOD 30 MIN: CPT | Mod: ,,, | Performed by: FAMILY MEDICINE

## 2022-08-02 PROCEDURE — 99214 PR OFFICE/OUTPT VISIT, EST, LEVL IV, 30-39 MIN: ICD-10-PCS | Mod: ,,, | Performed by: FAMILY MEDICINE

## 2022-08-02 PROCEDURE — 96372 PR INJECTION,THERAP/PROPH/DIAG2ST, IM OR SUBCUT: ICD-10-PCS | Mod: ,,, | Performed by: FAMILY MEDICINE

## 2022-08-02 PROCEDURE — 96372 THER/PROPH/DIAG INJ SC/IM: CPT | Mod: ,,, | Performed by: FAMILY MEDICINE

## 2022-08-02 RX ORDER — AZITHROMYCIN 250 MG/1
TABLET, FILM COATED ORAL
Qty: 6 TABLET | Refills: 0 | Status: SHIPPED | OUTPATIENT
Start: 2022-08-02 | End: 2022-08-07

## 2022-08-02 RX ORDER — CEFTRIAXONE 1 G/1
1 INJECTION, POWDER, FOR SOLUTION INTRAMUSCULAR; INTRAVENOUS
Status: COMPLETED | OUTPATIENT
Start: 2022-08-02 | End: 2022-08-02

## 2022-08-02 RX ORDER — DEXBROMPHENIRAMINE MALEATE, DEXTROMETHORPHAN HBR, PHENYLEPHRINE HCL 2; 15; 7.5 MG/5ML; MG/5ML; MG/5ML
5 LIQUID ORAL 3 TIMES DAILY PRN
Qty: 120 ML | Refills: 0 | Status: SHIPPED | OUTPATIENT
Start: 2022-08-02 | End: 2022-12-27

## 2022-08-02 RX ORDER — PREDNISONE 20 MG/1
20 TABLET ORAL DAILY
Qty: 7 TABLET | Refills: 0 | Status: SHIPPED | OUTPATIENT
Start: 2022-08-02 | End: 2022-10-31

## 2022-08-02 RX ADMIN — CEFTRIAXONE 1 G: 1 INJECTION, POWDER, FOR SOLUTION INTRAMUSCULAR; INTRAVENOUS at 10:08

## 2022-08-02 NOTE — ASSESSMENT & PLAN NOTE
She is still recovering from COVID-19, diagnosed about 2 weeks ago, likely continues to have an active infection. She understands that this could take some time to recover from.

## 2022-08-02 NOTE — PROGRESS NOTES
Salty Barahona MD    [address] [place] [address]     PATIENT NAME: Dayanara Monae  : 1945  DATE: 22  MRN: [id number]      Billing Provider: Salty Barahona MD  Level of Service:   Patient PCP Information     Provider PCP Type    Salty Barahona MD General          Reason for Visit / Chief Complaint: Follow-up (COVID on 2022 she states she has taken all medication but still feels tired, fatigued, SOB, cough)       Update PCP  Update Chief Complaint         History of Present Illness / Problem Focused Workflow     Dayanara Monae presents to the clinic with Follow-up (COVID on 2022 she states she has taken all medication but still feels tired, fatigued, SOB, cough)     She does report feeling better while on Paxlovid, but complains today that she still cannot taste or smell and still has a cough and fatigue    HPI    Review of Systems     Review of Systems   Constitutional: Positive for fatigue. Negative for activity change, appetite change, chills and fever.   HENT: Negative for nasal congestion, ear pain, hearing loss, postnasal drip and sore throat.    Respiratory: Positive for cough and shortness of breath. Negative for chest tightness and wheezing.    Cardiovascular: Negative for chest pain, palpitations, leg swelling and claudication.   Gastrointestinal: Negative for abdominal pain, change in bowel habit, constipation, diarrhea, nausea, vomiting and change in bowel habit.   Genitourinary: Negative for dysuria.   Musculoskeletal: Negative for arthralgias, back pain, gait problem and myalgias.   Integumentary:  Negative for rash.   Neurological: Negative for weakness and headaches.   Psychiatric/Behavioral: Negative for suicidal ideas. The patient is not nervous/anxious.         Medical / Social / Family History     Past Medical History:   Diagnosis Date    Actinic keratoses     Atrial fibrillation     CVA (cerebral  vascular accident)     Diabetes mellitus     Epilepsy     HTN (hypertension)     Non-melanoma skin cancer     Skin cancer        Past Surgical History:   Procedure Laterality Date    APPENDECTOMY      carpel tunnel Right     CHOLECYSTECTOMY      EYE SURGERY      HAND SURGERY      HYSTERECTOMY      JOINT REPLACEMENT         Social History  Ms.  reports that she has never smoked. She has never used smokeless tobacco. She reports that she does not drink alcohol and does not use drugs.    Family History  Ms.'s family history includes Diabetes in her sister; Hypertension in her mother; Stroke in her father.    Medications and Allergies     Medications  Outpatient Medications Marked as Taking for the 8/2/22 encounter (Office Visit) with Salty Barahona MD   Medication Sig Dispense Refill    acitretin (SORIATANE) 25 MG capsule Take one 25mg capsule PO daily for Psoriasis 90 capsule 1    aspirin (ECOTRIN) 81 MG EC tablet Take 81 mg by mouth once daily.      calcium carbonate (OS-FATOU) 600 mg calcium (1,500 mg) Tab 1 tablet      calcium carbonate (OS-FATOU) 600 mg calcium (1,500 mg) Tab Take 600 mg by mouth.      citalopram (CELEXA) 20 MG tablet Take 1 tablet (20 mg total) by mouth once daily. For MOOD 90 tablet 1    cyanocobalamin (VITAMIN B-12) 1000 MCG tablet Take 1,000 mcg by mouth.      donepeziL (ARICEPT) 5 MG tablet Take 1 tablet (5 mg total) by mouth once daily. For MEMORY 90 tablet 1    DULoxetine (CYMBALTA) 30 MG capsule TAKE 1 CAPSULE BY MOUTH ONCE DAILY FOR  JOINT  PAIN 90 capsule 1    hydroCHLOROthiazide (HYDRODIURIL) 25 MG tablet Take 1 tablet (25 mg total) by mouth once daily. For BLOOD PRESSURE 90 tablet 1    latanoprost 0.005 % ophthalmic solution INSTILL 1 DROP INTO EACH EYE AT BEDTIME AS DIRECTED      latanoprost 0.005 % ophthalmic solution 1 drop into affected eye in the evening 2.5 mL 1    leucovorin (WELLCOVORIN) 25 MG Tab Take 25 mg by mouth.      metFORMIN (GLUCOPHAGE-XR) 500  MG ER 24hr tablet Take 1 tablet (500 mg total) by mouth 2 (two) times daily with meals. For diabetes 180 tablet 1    metoprolol tartrate (LOPRESSOR) 25 MG tablet Take 1 tablet (25 mg total) by mouth 2 (two) times daily. For HEART 180 tablet 1    moxifloxacin (VIGAMOX) 0.5 % ophthalmic solution       omeprazole (PRILOSEC) 40 MG capsule Take 1 capsule (40 mg total) by mouth once daily. For STOMACH 90 capsule 1    potassium chloride (K-TAB) 20 mEq Take 1 tablet (20 mEq total) by mouth once daily at 6am. 90 tablet 1    rosuvastatin (CRESTOR) 20 MG tablet Take 1 tablet (20 mg total) by mouth every evening. For CHOLESTEROL 90 tablet 1    [DISCONTINUED] pyrilamine-chlophedianoL 12.5-12.5 mg/5 mL Liqd Take 10 mLs by mouth every 8 (eight) hours as needed (cough). 240 mL 0     Current Facility-Administered Medications for the 8/2/22 encounter (Office Visit) with Salty Barahona MD   Medication Dose Route Frequency Provider Last Rate Last Admin    [COMPLETED] cefTRIAXone injection 1 g  1 g Intramuscular 1 time in Clinic/HOD Salty Barahona MD   1 g at 08/02/22 1028       Allergies  Review of patient's allergies indicates:   Allergen Reactions    Vaseline carbolated petroleum Blisters    Adhesive tape-silicones     Bactrim [sulfamethoxazole-trimethoprim]        Physical Examination     Vitals:    08/02/22 0931   BP: 132/82   Pulse: 80   Resp: 16   Temp: 98 °F (36.7 °C)     Physical Exam  Vitals and nursing note reviewed.   Constitutional:       General: She is not in acute distress.     Appearance: Normal appearance. She is not ill-appearing.   Eyes:      Extraocular Movements: Extraocular movements intact.      Pupils: Pupils are equal, round, and reactive to light.   Cardiovascular:      Rate and Rhythm: Normal rate and regular rhythm.      Heart sounds: Normal heart sounds.   Pulmonary:      Effort: Pulmonary effort is normal.      Breath sounds: Normal breath sounds.   Abdominal:      General: Bowel sounds  are normal.      Palpations: Abdomen is soft.   Musculoskeletal:         General: Normal range of motion.   Skin:     Findings: No rash.   Neurological:      General: No focal deficit present.      Mental Status: She is alert and oriented to person, place, and time. Mental status is at baseline.   Psychiatric:         Mood and Affect: Mood normal.         Behavior: Behavior normal.          Assessment and Plan (including Health Maintenance)      Problem List  Smart Sets  Document Outside HM   :    Plan:         Health Maintenance Due   Topic Date Due    Diabetes Urine Screening  06/01/2022    COVID-19 Vaccine (4 - Booster) 06/04/2022    Hemoglobin A1c  07/25/2022       Problem List Items Addressed This Visit        ID    COVID-19 - Primary    Current Assessment & Plan     She is still recovering from COVID-19, diagnosed about 2 weeks ago, likely continues to have an active infection. She understands that this could take some time to recover from.            Relevant Medications    cefTRIAXone injection 1 g (Completed)      Other Visit Diagnoses     Upper respiratory tract infection, unspecified type        Relevant Medications    predniSONE (DELTASONE) 20 MG tablet    azithromycin (Z-GWEN) 250 MG tablet    dexbrompheniramine-phenylep-DM (POLYTUSSIN DM,DEXBROMPHENIRMN,) 2-7.5-15 mg/5 mL Liqd    cefTRIAXone injection 1 g (Completed)        COVID-19  -     cefTRIAXone injection 1 g    Upper respiratory tract infection, unspecified type  -     predniSONE (DELTASONE) 20 MG tablet; Take 1 tablet (20 mg total) by mouth once daily.  Dispense: 7 tablet; Refill: 0  -     azithromycin (Z-GWEN) 250 MG tablet; Take 2 tablets by mouth on day 1; Take 1 tablet by mouth on days 2-5  Dispense: 6 tablet; Refill: 0  -     dexbrompheniramine-phenylep-DM (POLYTUSSIN DM,DEXBROMPHENIRMN,) 2-7.5-15 mg/5 mL Liqd; Take 5 mLs by mouth 3 (three) times daily as needed (for COUGH and CONGESTION).  Dispense: 120 mL; Refill: 0  -     cefTRIAXone  injection 1 g       Health Maintenance Topics with due status: Not Due       Topic Last Completion Date    Colorectal Cancer Screening 02/04/2020    Influenza Vaccine 01/31/2022    Eye Exam 04/20/2022    Foot Exam 04/25/2022    Mammogram 04/29/2022    DEXA Scan 04/29/2022    Lipid Panel 06/30/2022    Low Dose Statin 08/02/2022       Procedures     Future Appointments   Date Time Provider Department Center   1/4/2023  9:15 AM Rebekah Garcia MD [facility] DERM [place]   1/24/2023 10:15 AM INFUSION, [facility] INFUSN [place] Jak M   2/3/2023  3:00 PM AWV NURSE, [facility] FAMILY MEDICINE [facility] LON Wong        Follow up if symptoms worsen or fail to improve.     Signature:  Salty Barahona MD  [address] [place] [address]  Phone #: [phone]  Fax #: [phone]    Date of encounter: 8/2/22    There are no Patient Instructions on file for this visit.

## 2022-09-19 ENCOUNTER — OFFICE VISIT (OUTPATIENT)
Dept: FAMILY MEDICINE | Facility: CLINIC | Age: 77
End: 2022-09-19
Payer: MEDICARE

## 2022-09-19 VITALS
SYSTOLIC BLOOD PRESSURE: 137 MMHG | DIASTOLIC BLOOD PRESSURE: 82 MMHG | HEIGHT: 59 IN | BODY MASS INDEX: 35.28 KG/M2 | TEMPERATURE: 98 F | WEIGHT: 175 LBS | HEART RATE: 76 BPM | RESPIRATION RATE: 16 BRPM | OXYGEN SATURATION: 97 %

## 2022-09-19 DIAGNOSIS — K42.9 PERIUMBILICAL HERNIA: Chronic | ICD-10-CM

## 2022-09-19 DIAGNOSIS — E11.9 TYPE 2 DIABETES MELLITUS WITHOUT COMPLICATION, WITHOUT LONG-TERM CURRENT USE OF INSULIN: Primary | Chronic | ICD-10-CM

## 2022-09-19 LAB
EST. AVERAGE GLUCOSE BLD GHB EST-MCNC: 144 MG/DL
HBA1C MFR BLD HPLC: 6.9 % (ref 4.5–6.6)

## 2022-09-19 PROCEDURE — 99214 OFFICE O/P EST MOD 30 MIN: CPT | Mod: ,,, | Performed by: FAMILY MEDICINE

## 2022-09-19 PROCEDURE — 82043 MICROALBUMIN / CREATININE RATIO URINE: ICD-10-PCS | Mod: ,,, | Performed by: CLINICAL MEDICAL LABORATORY

## 2022-09-19 PROCEDURE — 82043 UR ALBUMIN QUANTITATIVE: CPT | Mod: ,,, | Performed by: CLINICAL MEDICAL LABORATORY

## 2022-09-19 PROCEDURE — 82570 MICROALBUMIN / CREATININE RATIO URINE: ICD-10-PCS | Mod: ,,, | Performed by: CLINICAL MEDICAL LABORATORY

## 2022-09-19 PROCEDURE — 83036 HEMOGLOBIN GLYCOSYLATED A1C: CPT | Mod: ,,, | Performed by: CLINICAL MEDICAL LABORATORY

## 2022-09-19 PROCEDURE — 83036 HEMOGLOBIN A1C: ICD-10-PCS | Mod: ,,, | Performed by: CLINICAL MEDICAL LABORATORY

## 2022-09-19 PROCEDURE — 99214 PR OFFICE/OUTPT VISIT, EST, LEVL IV, 30-39 MIN: ICD-10-PCS | Mod: ,,, | Performed by: FAMILY MEDICINE

## 2022-09-19 PROCEDURE — 82570 ASSAY OF URINE CREATININE: CPT | Mod: ,,, | Performed by: CLINICAL MEDICAL LABORATORY

## 2022-09-19 NOTE — PROGRESS NOTES
Salty Barahona MD    [address] [place] [address]     PATIENT NAME: aDyanara Monae  : 1945  DATE: 22  MRN: [id number]      Billing Provider: Salty Barahona MD  Level of Service: ND OFFICE/OUTPT VISIT, EST, LEVL III, 20-29 MIN  Patient PCP Information       Provider PCP Type    Salty Barahona MD General            Reason for Visit / Chief Complaint: Lesion (Upper left abdominal area lesion under the skin she states she noticed it last night.)       Update PCP  Update Chief Complaint         History of Present Illness / Problem Focused Workflow     Dayanara Monae presents to the clinic with Lesion (Upper left abdominal area lesion under the skin she states she noticed it last night.)     HPI    Review of Systems     Review of Systems   Constitutional:  Negative for activity change, appetite change, chills, fatigue and fever.   HENT:  Negative for nasal congestion, ear pain, hearing loss, postnasal drip and sore throat.    Respiratory:  Negative for cough, chest tightness, shortness of breath and wheezing.    Cardiovascular:  Negative for chest pain, palpitations, leg swelling and claudication.   Gastrointestinal:  Negative for abdominal pain, change in bowel habit, constipation, diarrhea, nausea, vomiting and change in bowel habit.   Genitourinary:  Negative for dysuria.   Musculoskeletal:  Negative for arthralgias, back pain, gait problem and myalgias.   Integumentary:  Negative for rash.   Neurological:  Negative for weakness and headaches.   Psychiatric/Behavioral:  Negative for suicidal ideas. The patient is not nervous/anxious.       Medical / Social / Family History     Past Medical History:   Diagnosis Date    Actinic keratoses     Atrial fibrillation     CVA (cerebral vascular accident)     Diabetes mellitus     Epilepsy     HTN (hypertension)     Non-melanoma skin cancer     Skin cancer        Past Surgical History:   Procedure  Laterality Date    APPENDECTOMY      carpel tunnel Right     CHOLECYSTECTOMY      EYE SURGERY      HAND SURGERY      HYSTERECTOMY      JOINT REPLACEMENT         Social History  Ms.  reports that she has never smoked. She has never used smokeless tobacco. She reports that she does not drink alcohol and does not use drugs.    Family History  Ms.'s family history includes Diabetes in her sister; Hypertension in her mother; Stroke in her father.    Medications and Allergies     Medications  Outpatient Medications Marked as Taking for the 9/19/22 encounter (Office Visit) with Salty Barahona MD   Medication Sig Dispense Refill    acitretin (SORIATANE) 25 MG capsule Take one 25mg capsule PO daily for Psoriasis 90 capsule 1    aspirin (ECOTRIN) 81 MG EC tablet Take 81 mg by mouth once daily.      calcium carbonate (OS-FATOU) 600 mg calcium (1,500 mg) Tab 1 tablet      calcium carbonate (OS-FATOU) 600 mg calcium (1,500 mg) Tab Take 600 mg by mouth.      citalopram (CELEXA) 20 MG tablet Take 1 tablet (20 mg total) by mouth once daily. For MOOD 90 tablet 1    cyanocobalamin (VITAMIN B-12) 1000 MCG tablet Take 1,000 mcg by mouth.      donepeziL (ARICEPT) 5 MG tablet Take 1 tablet (5 mg total) by mouth once daily. For MEMORY 90 tablet 1    DULoxetine (CYMBALTA) 30 MG capsule TAKE 1 CAPSULE BY MOUTH ONCE DAILY FOR  JOINT  PAIN 90 capsule 1    hydroCHLOROthiazide (HYDRODIURIL) 25 MG tablet Take 1 tablet (25 mg total) by mouth once daily. For BLOOD PRESSURE 90 tablet 1    latanoprost 0.005 % ophthalmic solution INSTILL 1 DROP INTO EACH EYE AT BEDTIME AS DIRECTED      latanoprost 0.005 % ophthalmic solution 1 drop into affected eye in the evening 2.5 mL 1    leucovorin (WELLCOVORIN) 25 MG Tab Take 25 mg by mouth.      metFORMIN (GLUCOPHAGE-XR) 500 MG ER 24hr tablet Take 1 tablet (500 mg total) by mouth 2 (two) times daily with meals. For diabetes 180 tablet 1    metoprolol tartrate (LOPRESSOR) 25 MG tablet Take 1 tablet (25 mg  total) by mouth 2 (two) times daily. For HEART 180 tablet 1    moxifloxacin (VIGAMOX) 0.5 % ophthalmic solution       omeprazole (PRILOSEC) 40 MG capsule Take 1 capsule (40 mg total) by mouth once daily. For STOMACH 90 capsule 1    potassium chloride (K-TAB) 20 mEq Take 1 tablet (20 mEq total) by mouth once daily at 6am. 90 tablet 1    rosuvastatin (CRESTOR) 20 MG tablet Take 1 tablet (20 mg total) by mouth every evening. For CHOLESTEROL 90 tablet 1       Allergies  Review of patient's allergies indicates:   Allergen Reactions    Vaseline carbolated petroleum Blisters    Adhesive tape-silicones     Bactrim [sulfamethoxazole-trimethoprim]        Physical Examination     Vitals:    09/19/22 1216   BP: 137/82   Pulse: 76   Resp: 16   Temp: 97.8 °F (36.6 °C)     Physical Exam  Vitals and nursing note reviewed.   Constitutional:       General: She is not in acute distress.     Appearance: Normal appearance. She is not ill-appearing.   Eyes:      Extraocular Movements: Extraocular movements intact.      Pupils: Pupils are equal, round, and reactive to light.   Cardiovascular:      Rate and Rhythm: Normal rate and regular rhythm.      Heart sounds: Normal heart sounds.   Pulmonary:      Effort: Pulmonary effort is normal.      Breath sounds: Normal breath sounds.   Abdominal:      General: Bowel sounds are normal.      Palpations: Abdomen is soft.          Comments: No tenderness, bulging lesion worse with valsalva. Approximately 2cm in diameter   Musculoskeletal:         General: Normal range of motion.   Skin:     Findings: No rash.   Neurological:      General: No focal deficit present.      Mental Status: She is alert and oriented to person, place, and time. Mental status is at baseline.   Psychiatric:         Mood and Affect: Mood normal.         Behavior: Behavior normal.          Assessment and Plan (including Health Maintenance)      Problem List  Smart Sets  Document Outside HM   :    Plan:         Health  Maintenance Due   Topic Date Due    Diabetes Urine Screening  06/01/2022    COVID-19 Vaccine (4 - Booster) 06/04/2022    Influenza Vaccine (1) 09/01/2022       Problem List Items Addressed This Visit          Endocrine    Type 2 diabetes mellitus without complication, without long-term current use of insulin - Primary (Chronic)    Current Assessment & Plan      - Check glucose outside of clinic, record numbers, and bring log to follow up visit.    - Take medications as directed, and bring all medications to every clinic appointment.    - Eat a diabetic diet, if there are concerns about what that entails, we have a diabetic educator in our clinic.    - Cardiovascular exercise at least 3 times per week, for at least 15 minutes.    - Patient should be on a Statin medication, unless patient cannot tolerate a Statin.    - Aspirin should be taken everyday,  81mg, unless a higher dose is indicated for other medicaiton conditions. Also do not recommend Aspirin for a patient with known adverse effects from Aspirin.    - Recommend yearly, dilated eye exams for all Diabetic Patients.    - Monitor feet for calluses, abrasion, or other abnormalities. Report any concerns at every clinic visit.    -   Hemoglobin A1C   Date Value Ref Range Status   04/25/2022 7.9 (H) 4.5 - 6.6 % Final     Comment:       Normal:               <5.7%  Pre-Diabetic:       5.7% to 6.4%  Diabetic:             >6.4%  Diabetic Goal:     <7%   01/17/2022 8.9 (H) 4.5 - 6.6 % Final     Comment:       Normal:               <5.7%  Pre-Diabetic:       5.7% to 6.4%  Diabetic:             >6.4%  Diabetic Goal:     <7%                  GI    Periumbilical hernia (Chronic)    Current Assessment & Plan     Approximately 2cm, easily reduced. Will monitor for now.           Type 2 diabetes mellitus without complication, without long-term current use of insulin    Periumbilical hernia     Health Maintenance Topics with due status: Not Due       Topic Last Completion  Date    Colorectal Cancer Screening 02/04/2020    Eye Exam 04/20/2022    Foot Exam 04/25/2022    Mammogram 04/29/2022    DEXA Scan 04/29/2022    Lipid Panel 06/30/2022    Low Dose Statin 09/19/2022       Procedures     Future Appointments   Date Time Provider Department Center   1/4/2023  9:15 AM Rebekah Garcia MD [facility]   1/24/2023 10:15 AM INFUSION, [facility] INFUSN [place] Jak PATINO   2/3/2023  3:00 PM AWV NURSE, [facility] FAMILY MEDICINE [facility] LON Wong        No follow-ups on file.     Signature:  Salty Barahona MD  [address] [place] [address]  Phone #: [phone]  Fax #: [phone]    Date of encounter: 9/19/22    There are no Patient Instructions on file for this visit.

## 2022-09-19 NOTE — ASSESSMENT & PLAN NOTE
- Check glucose outside of clinic, record numbers, and bring log to follow up visit.    - Take medications as directed, and bring all medications to every clinic appointment.    - Eat a diabetic diet, if there are concerns about what that entails, we have a diabetic educator in our clinic.    - Cardiovascular exercise at least 3 times per week, for at least 15 minutes.    - Patient should be on a Statin medication, unless patient cannot tolerate a Statin.    - Aspirin should be taken everyday,  81mg, unless a higher dose is indicated for other medicaiton conditions. Also do not recommend Aspirin for a patient with known adverse effects from Aspirin.    - Recommend yearly, dilated eye exams for all Diabetic Patients.    - Monitor feet for calluses, abrasion, or other abnormalities. Report any concerns at every clinic visit.    -   Hemoglobin A1C   Date Value Ref Range Status   04/25/2022 7.9 (H) 4.5 - 6.6 % Final     Comment:       Normal:               <5.7%  Pre-Diabetic:       5.7% to 6.4%  Diabetic:             >6.4%  Diabetic Goal:     <7%   01/17/2022 8.9 (H) 4.5 - 6.6 % Final     Comment:       Normal:               <5.7%  Pre-Diabetic:       5.7% to 6.4%  Diabetic:             >6.4%  Diabetic Goal:     <7%

## 2022-09-20 ENCOUNTER — PATIENT MESSAGE (OUTPATIENT)
Dept: FAMILY MEDICINE | Facility: CLINIC | Age: 77
End: 2022-09-20
Payer: MEDICARE

## 2022-09-20 LAB
CREAT UR-MCNC: 83 MG/DL (ref 28–219)
MICROALBUMIN UR-MCNC: 1.2 MG/DL (ref 0–2.8)
MICROALBUMIN/CREAT RATIO PNL UR: 14.5 MG/G (ref 0–30)

## 2022-10-09 DIAGNOSIS — Z71.89 COMPLEX CARE COORDINATION: ICD-10-CM

## 2022-10-31 ENCOUNTER — OFFICE VISIT (OUTPATIENT)
Dept: FAMILY MEDICINE | Facility: CLINIC | Age: 77
End: 2022-10-31
Payer: MEDICARE

## 2022-10-31 VITALS — DIASTOLIC BLOOD PRESSURE: 71 MMHG | SYSTOLIC BLOOD PRESSURE: 125 MMHG

## 2022-10-31 DIAGNOSIS — J30.89 NON-SEASONAL ALLERGIC RHINITIS, UNSPECIFIED TRIGGER: Primary | Chronic | ICD-10-CM

## 2022-10-31 DIAGNOSIS — J02.9 SORE THROAT: ICD-10-CM

## 2022-10-31 PROBLEM — E53.8 B12 DEFICIENCY: Status: ACTIVE | Noted: 2022-05-10

## 2022-10-31 PROBLEM — H90.5 SENSORINEURAL HEARING LOSS (SNHL) OF RIGHT EAR: Chronic | Status: ACTIVE | Noted: 2022-05-10

## 2022-10-31 PROBLEM — G56.03 BILATERAL CARPAL TUNNEL SYNDROME: Chronic | Status: ACTIVE | Noted: 2022-05-10

## 2022-10-31 PROBLEM — G56.03 BILATERAL CARPAL TUNNEL SYNDROME: Status: ACTIVE | Noted: 2022-05-10

## 2022-10-31 PROBLEM — G43.109 MIGRAINE WITH AURA AND WITHOUT STATUS MIGRAINOSUS, NOT INTRACTABLE: Chronic | Status: ACTIVE | Noted: 2022-05-10

## 2022-10-31 PROBLEM — G43.109 MIGRAINE WITH AURA AND WITHOUT STATUS MIGRAINOSUS, NOT INTRACTABLE: Status: ACTIVE | Noted: 2022-05-10

## 2022-10-31 PROBLEM — H90.5 SENSORINEURAL HEARING LOSS (SNHL) OF RIGHT EAR: Status: ACTIVE | Noted: 2022-05-10

## 2022-10-31 PROBLEM — E53.8 B12 DEFICIENCY: Chronic | Status: ACTIVE | Noted: 2022-05-10

## 2022-10-31 PROBLEM — G62.9 NEUROPATHY: Status: ACTIVE | Noted: 2022-05-10

## 2022-10-31 PROBLEM — G62.9 NEUROPATHY: Chronic | Status: ACTIVE | Noted: 2022-05-10

## 2022-10-31 PROCEDURE — 99213 OFFICE O/P EST LOW 20 MIN: CPT | Mod: ,,, | Performed by: FAMILY MEDICINE

## 2022-10-31 PROCEDURE — 99213 PR OFFICE/OUTPT VISIT, EST, LEVL III, 20-29 MIN: ICD-10-PCS | Mod: ,,, | Performed by: FAMILY MEDICINE

## 2022-10-31 RX ORDER — LORATADINE 10 MG/1
10 TABLET ORAL DAILY
Qty: 90 TABLET | Refills: 1 | Status: SHIPPED | OUTPATIENT
Start: 2022-10-31 | End: 2023-02-03

## 2022-10-31 RX ORDER — FLUTICASONE PROPIONATE 50 MCG
1 SPRAY, SUSPENSION (ML) NASAL DAILY
Qty: 16 G | Refills: 5 | Status: SHIPPED | OUTPATIENT
Start: 2022-10-31

## 2022-10-31 NOTE — PROGRESS NOTES
Salty Barahona MD    [address] [place] [address]     PATIENT NAME: Dayanara Monae  : 1945  DATE: 10/31/22  MRN: [id number]      Billing Provider: Salty Barahona MD  Level of Service: MO OFFICE/OUTPT VISIT, EST, LEVL III, 20-29 MIN  Patient PCP Information       Provider PCP Type    Salty Barahona MD General            Reason for Visit / Chief Complaint: Nasal Congestion (Pt is here with nasal congestion and runny nose. She also has a dry cough that has been present for about a week. She also has a spot under her tongue that she has had checked before that still has not healed.)       Update PCP  Update Chief Complaint         History of Present Illness / Problem Focused Workflow     Dayanara Monae presents to the clinic with Nasal Congestion (Pt is here with nasal congestion and runny nose. She also has a dry cough that has been present for about a week. She also has a spot under her tongue that she has had checked before that still has not healed.)     Her symptoms are more chronic than acute, she has a lot of sinus drainage, and then a dry cough    HPI    Review of Systems     Review of Systems   Constitutional:  Negative for activity change, appetite change, chills, fatigue and fever.   HENT:  Positive for nasal congestion, sinus pressure/congestion and sore throat. Negative for ear pain, hearing loss and postnasal drip.    Respiratory:  Positive for cough. Negative for chest tightness, shortness of breath and wheezing.    Cardiovascular:  Negative for chest pain, palpitations, leg swelling and claudication.   Gastrointestinal:  Negative for abdominal pain, change in bowel habit, constipation, diarrhea, nausea, vomiting and change in bowel habit.   Genitourinary:  Negative for dysuria.   Musculoskeletal:  Negative for arthralgias, back pain, gait problem and myalgias.   Integumentary:  Negative for rash.   Neurological:  Negative for  weakness and headaches.   Psychiatric/Behavioral:  Negative for suicidal ideas. The patient is not nervous/anxious.       Medical / Social / Family History     Past Medical History:   Diagnosis Date    Actinic keratoses     Atrial fibrillation     CVA (cerebral vascular accident)     Epilepsy     Non-melanoma skin cancer     Skin cancer        Past Surgical History:   Procedure Laterality Date    APPENDECTOMY      carpel tunnel Right     CHOLECYSTECTOMY      EYE SURGERY      HAND SURGERY      HYSTERECTOMY      JOINT REPLACEMENT         Social History  Ms.  reports that she has never smoked. She has never used smokeless tobacco. She reports that she does not drink alcohol and does not use drugs.    Family History  Ms.'s family history includes Diabetes in her sister; Hypertension in her mother; Stroke in her father.    Medications and Allergies     Medications  Outpatient Medications Marked as Taking for the 10/31/22 encounter (Office Visit) with Salty Barahona MD   Medication Sig Dispense Refill    acitretin (SORIATANE) 25 MG capsule Take one 25mg capsule PO daily for Psoriasis 90 capsule 1    aspirin (ECOTRIN) 81 MG EC tablet Take 81 mg by mouth once daily.      calcium carbonate (OS-FATOU) 600 mg calcium (1,500 mg) Tab 1 tablet      ciprofloxacin HCl (CIPRO) 250 MG tablet       citalopram (CELEXA) 20 MG tablet Take 1 tablet (20 mg total) by mouth once daily. For MOOD 90 tablet 1    cyanocobalamin (VITAMIN B-12) 1000 MCG tablet Take 1,000 mcg by mouth.      dexbrompheniramine-phenylep-DM (POLYTUSSIN DM,DEXBROMPHENIRMN,) 2-7.5-15 mg/5 mL Liqd Take 5 mLs by mouth 3 (three) times daily as needed (for COUGH and CONGESTION). 120 mL 0    donepeziL (ARICEPT) 5 MG tablet Take 1 tablet (5 mg total) by mouth once daily. For MEMORY 90 tablet 1    doxycycline (VIBRAMYCIN) 100 MG Cap       DULoxetine (CYMBALTA) 30 MG capsule TAKE 1 CAPSULE BY MOUTH ONCE DAILY FOR  JOINT  PAIN 90 capsule 1    hydroCHLOROthiazide (HYDRODIURIL)  25 MG tablet Take 1 tablet (25 mg total) by mouth once daily. For BLOOD PRESSURE 90 tablet 1    latanoprost 0.005 % ophthalmic solution 1 drop into affected eye in the evening 2.5 mL 1    metFORMIN (GLUCOPHAGE-XR) 500 MG ER 24hr tablet Take 1 tablet (500 mg total) by mouth 2 (two) times daily with meals. For diabetes 180 tablet 1    metoprolol tartrate (LOPRESSOR) 25 MG tablet Take 1 tablet (25 mg total) by mouth 2 (two) times daily. For HEART 180 tablet 1    omeprazole (PRILOSEC) 40 MG capsule Take 1 capsule (40 mg total) by mouth once daily. For STOMACH 90 capsule 1    potassium chloride (K-TAB) 20 mEq Take 1 tablet (20 mEq total) by mouth once daily at 6am. 90 tablet 1    rosuvastatin (CRESTOR) 20 MG tablet Take 1 tablet (20 mg total) by mouth every evening. For CHOLESTEROL 90 tablet 1       Allergies  Review of patient's allergies indicates:   Allergen Reactions    Vaseline carbolated petroleum Blisters    Adhesive tape-silicones     Bactrim [sulfamethoxazole-trimethoprim]        Physical Examination     Vitals:    10/31/22 1045   BP: 125/71     Physical Exam  Vitals and nursing note reviewed.   Constitutional:       General: She is not in acute distress.     Appearance: Normal appearance. She is not ill-appearing.   Eyes:      Extraocular Movements: Extraocular movements intact.      Pupils: Pupils are equal, round, and reactive to light.   Cardiovascular:      Rate and Rhythm: Normal rate and regular rhythm.      Heart sounds: Normal heart sounds.   Pulmonary:      Effort: Pulmonary effort is normal.      Breath sounds: Normal breath sounds.   Abdominal:      General: Bowel sounds are normal.      Palpations: Abdomen is soft.   Musculoskeletal:         General: Normal range of motion.   Skin:     Findings: No rash.   Neurological:      General: No focal deficit present.      Mental Status: She is alert and oriented to person, place, and time. Mental status is at baseline.   Psychiatric:         Mood and  Affect: Mood normal.         Behavior: Behavior normal.          Assessment and Plan (including Health Maintenance)      Problem List  Smart Sets  Document Outside HM   :    Plan:         There are no preventive care reminders to display for this patient.      Problem List Items Addressed This Visit          ENT    Non-seasonal allergic rhinitis - Primary (Chronic)    Relevant Medications    loratadine (CLARITIN) 10 mg tablet    fluticasone propionate (FLONASE) 50 mcg/actuation nasal spray     Other Visit Diagnoses       Sore throat              Non-seasonal allergic rhinitis, unspecified trigger  -     loratadine (CLARITIN) 10 mg tablet; Take 1 tablet (10 mg total) by mouth once daily. For ALLERGIES  Dispense: 90 tablet; Refill: 1  -     fluticasone propionate (FLONASE) 50 mcg/actuation nasal spray; 1 spray (50 mcg total) by Each Nostril route once daily.  Dispense: 16 g; Refill: 5    Sore throat     Health Maintenance Topics with due status: Not Due       Topic Last Completion Date    Colorectal Cancer Screening 02/04/2020    Eye Exam 04/20/2022    Foot Exam 04/25/2022    DEXA Scan 04/29/2022    Lipid Panel 06/30/2022    Diabetes Urine Screening 09/19/2022    Hemoglobin A1c 09/19/2022    Low Dose Statin 10/31/2022       Procedures     Future Appointments   Date Time Provider Department Center   1/4/2023  9:15 AM Rebekah Garcia MD [facility] DERM [place]   1/24/2023 10:15 AM INFUSION, [facility] INFUSN [place] [facility]   2/3/2023  3:00 PM AWV NURSE, [facility] FAMILY MEDICINE [facility] LON Wong        Follow up if symptoms worsen or fail to improve.     Signature:  Salty Barahona MD  [address] [place] [address]  Phone #: [phone]  Fax #: [phone]    Date of encounter: 10/31/22    There are no Patient Instructions on file for this visit.

## 2022-11-29 ENCOUNTER — OFFICE VISIT (OUTPATIENT)
Dept: FAMILY MEDICINE | Facility: CLINIC | Age: 77
End: 2022-11-29
Payer: MEDICARE

## 2022-11-29 ENCOUNTER — HOSPITAL ENCOUNTER (OUTPATIENT)
Dept: RADIOLOGY | Facility: HOSPITAL | Age: 77
Discharge: HOME | End: 2022-11-29
Attending: FAMILY MEDICINE
Payer: MEDICARE

## 2022-11-29 DIAGNOSIS — R05.3 CHRONIC COUGH: ICD-10-CM

## 2022-11-29 DIAGNOSIS — I10 ESSENTIAL HYPERTENSION: Chronic | ICD-10-CM

## 2022-11-29 DIAGNOSIS — R06.2 WHEEZE: ICD-10-CM

## 2022-11-29 DIAGNOSIS — J30.89 NON-SEASONAL ALLERGIC RHINITIS, UNSPECIFIED TRIGGER: Chronic | ICD-10-CM

## 2022-11-29 DIAGNOSIS — E11.9 TYPE 2 DIABETES MELLITUS WITHOUT COMPLICATION, WITHOUT LONG-TERM CURRENT USE OF INSULIN: Chronic | ICD-10-CM

## 2022-11-29 DIAGNOSIS — J22 LOWER RESPIRATORY INFECTION: Primary | ICD-10-CM

## 2022-11-29 DIAGNOSIS — R05.3 CHRONIC COUGH: Chronic | ICD-10-CM

## 2022-11-29 PROBLEM — U07.1 COVID-19: Status: RESOLVED | Noted: 2022-08-02 | Resolved: 2022-11-29

## 2022-11-29 PROBLEM — Z11.52 ENCOUNTER FOR SCREENING LABORATORY TESTING FOR COVID-19 VIRUS: Status: RESOLVED | Noted: 2021-08-16 | Resolved: 2022-11-29

## 2022-11-29 PROCEDURE — 99214 PR OFFICE/OUTPT VISIT, EST, LEVL IV, 30-39 MIN: ICD-10-PCS | Mod: ,,, | Performed by: FAMILY MEDICINE

## 2022-11-29 PROCEDURE — 71046 X-RAY EXAM CHEST 2 VIEWS: CPT | Mod: TC,PN

## 2022-11-29 PROCEDURE — 99214 OFFICE O/P EST MOD 30 MIN: CPT | Mod: ,,, | Performed by: FAMILY MEDICINE

## 2022-11-29 PROCEDURE — 94640 PR INHAL RX, AIRWAY OBST/DX SPUTUM INDUCT: ICD-10-PCS | Mod: ,,, | Performed by: FAMILY MEDICINE

## 2022-11-29 PROCEDURE — 94640 AIRWAY INHALATION TREATMENT: CPT | Mod: ,,, | Performed by: FAMILY MEDICINE

## 2022-11-29 RX ORDER — ALBUTEROL SULFATE 90 UG/1
2 AEROSOL, METERED RESPIRATORY (INHALATION) EVERY 6 HOURS PRN
Qty: 18 G | Refills: 5 | Status: SHIPPED | OUTPATIENT
Start: 2022-11-29

## 2022-11-29 RX ORDER — PREDNISONE 20 MG/1
TABLET ORAL
Qty: 46 TABLET | Refills: 0 | Status: SHIPPED | OUTPATIENT
Start: 2022-11-29 | End: 2022-12-27

## 2022-11-29 RX ORDER — BENZONATATE 100 MG/1
100 CAPSULE ORAL 3 TIMES DAILY PRN
Qty: 30 CAPSULE | Refills: 1 | Status: SHIPPED | OUTPATIENT
Start: 2022-11-29 | End: 2022-12-09

## 2022-11-29 RX ORDER — IPRATROPIUM BROMIDE AND ALBUTEROL SULFATE 2.5; .5 MG/3ML; MG/3ML
3 SOLUTION RESPIRATORY (INHALATION)
Status: COMPLETED | OUTPATIENT
Start: 2022-11-29 | End: 2022-11-29

## 2022-11-29 RX ORDER — LEVOFLOXACIN 500 MG/1
500 TABLET, FILM COATED ORAL DAILY
Qty: 7 TABLET | Refills: 0 | Status: SHIPPED | OUTPATIENT
Start: 2022-11-29 | End: 2022-12-27

## 2022-11-29 RX ADMIN — IPRATROPIUM BROMIDE AND ALBUTEROL SULFATE 3 ML: 2.5; .5 SOLUTION RESPIRATORY (INHALATION) at 11:11

## 2022-11-29 NOTE — PROGRESS NOTES
Salty Barahona MD    [address] [place] [address]     PATIENT NAME: Dayanara Monae  : 1945  DATE: 22  MRN: [id number]      Billing Provider: Salty Barahona MD  Level of Service: [place] OFFICE/OUTPT VISIT, EST, LEVL IV, 30-39 MIN  Patient PCP Information       Provider PCP Type    Salty Barahona MD General            Reason for Visit / Chief Complaint: Cough (Pt was seen two weeks ago for sinus problems. She is still coughing and has taken all the medications we gave her last time. She is also concerned some of her medications might be causing it. )       Update PCP  Update Chief Complaint         History of Present Illness / Problem Focused Workflow     Dayanara Monae presents to the clinic with Cough (Pt was seen two weeks ago for sinus problems. She is still coughing and has taken all the medications we gave her last time. She is also concerned some of her medications might be causing it. )     She has worsened since her last visit a month ago, she has recurrent sinus symptoms but this cough is a more new symptom. The cough is deep, to the point of vomiting at times.     Cough  Pertinent negatives include no chest pain, chills, ear pain, fever, headaches, myalgias, postnasal drip, rash, sore throat, shortness of breath or wheezing.     Review of Systems     Review of Systems   Constitutional:  Negative for activity change, appetite change, chills, fatigue and fever.   HENT:  Negative for nasal congestion, ear pain, hearing loss, postnasal drip and sore throat.    Respiratory:  Positive for cough. Negative for chest tightness, shortness of breath and wheezing.    Cardiovascular:  Negative for chest pain, palpitations, leg swelling and claudication.   Gastrointestinal:  Negative for abdominal pain, change in bowel habit, constipation, diarrhea, nausea, vomiting and change in bowel habit.   Genitourinary:  Negative for dysuria.    Musculoskeletal:  Negative for arthralgias, back pain, gait problem and myalgias.   Integumentary:  Negative for rash.   Neurological:  Negative for weakness and headaches.   Psychiatric/Behavioral:  Negative for suicidal ideas. The patient is not nervous/anxious.       Medical / Social / Family History     Past Medical History:   Diagnosis Date    Actinic keratoses     Atrial fibrillation     CVA (cerebral vascular accident)     Epilepsy     Non-melanoma skin cancer     Skin cancer        Past Surgical History:   Procedure Laterality Date    APPENDECTOMY      carpel tunnel Right     CHOLECYSTECTOMY      EYE SURGERY      HAND SURGERY      HYSTERECTOMY      JOINT REPLACEMENT         Social History  Ms.  reports that she has never smoked. She has never used smokeless tobacco. She reports that she does not drink alcohol and does not use drugs.    Family History  Ms.'s family history includes Diabetes in her sister; Hypertension in her mother; Stroke in her father.    Medications and Allergies     Medications  Outpatient Medications Marked as Taking for the 11/29/22 encounter (Office Visit) with Salty Barahona MD   Medication Sig Dispense Refill    acitretin (SORIATANE) 25 MG capsule Take one 25mg capsule PO daily for Psoriasis 90 capsule 1    aspirin (ECOTRIN) 81 MG EC tablet Take 81 mg by mouth once daily.      calcium carbonate (OS-FATOU) 600 mg calcium (1,500 mg) Tab 1 tablet      ciprofloxacin HCl (CIPRO) 250 MG tablet       citalopram (CELEXA) 20 MG tablet Take 1 tablet (20 mg total) by mouth once daily. For MOOD 90 tablet 1    cyanocobalamin (VITAMIN B-12) 1000 MCG tablet Take 1,000 mcg by mouth.      dexbrompheniramine-phenylep-DM (POLYTUSSIN DM,DEXBROMPHENIRMN,) 2-7.5-15 mg/5 mL Liqd Take 5 mLs by mouth 3 (three) times daily as needed (for COUGH and CONGESTION). 120 mL 0    donepeziL (ARICEPT) 5 MG tablet Take 1 tablet (5 mg total) by mouth once daily. For MEMORY 90 tablet 1    doxycycline (VIBRAMYCIN) 100  MG Cap       DULoxetine (CYMBALTA) 30 MG capsule TAKE 1 CAPSULE BY MOUTH ONCE DAILY FOR  JOINT  PAIN 90 capsule 1    fluticasone propionate (FLONASE) 50 mcg/actuation nasal spray 1 spray (50 mcg total) by Each Nostril route once daily. 16 g 5    hydroCHLOROthiazide (HYDRODIURIL) 25 MG tablet Take 1 tablet (25 mg total) by mouth once daily. For BLOOD PRESSURE 90 tablet 1    latanoprost 0.005 % ophthalmic solution INSTILL 1 DROP INTO EACH EYE AT BEDTIME AS DIRECTED      latanoprost 0.005 % ophthalmic solution 1 drop into affected eye in the evening 2.5 mL 1    leucovorin (WELLCOVORIN) 25 MG Tab Take 25 mg by mouth.      loratadine (CLARITIN) 10 mg tablet Take 1 tablet (10 mg total) by mouth once daily. For ALLERGIES 90 tablet 1    metFORMIN (GLUCOPHAGE-XR) 500 MG ER 24hr tablet Take 1 tablet (500 mg total) by mouth 2 (two) times daily with meals. For diabetes 180 tablet 1    metoprolol tartrate (LOPRESSOR) 25 MG tablet Take 1 tablet (25 mg total) by mouth 2 (two) times daily. For HEART 180 tablet 1    moxifloxacin (VIGAMOX) 0.5 % ophthalmic solution       omeprazole (PRILOSEC) 40 MG capsule Take 1 capsule (40 mg total) by mouth once daily. For STOMACH 90 capsule 1    potassium chloride (K-TAB) 20 mEq Take 1 tablet (20 mEq total) by mouth once daily at 6am. 90 tablet 1    rosuvastatin (CRESTOR) 20 MG tablet Take 1 tablet (20 mg total) by mouth every evening. For CHOLESTEROL 90 tablet 1    travoprost, benzalkonium, (TRAVATAN) 0.004 % ophthalmic solution Place 1 drop into both eyes every evening.         Allergies  Review of patient's allergies indicates:   Allergen Reactions    Vaseline carbolated petroleum Blisters    Adhesive tape-silicones     Bactrim [sulfamethoxazole-trimethoprim]        Physical Examination   There were no vitals filed for this visit.  Physical Exam  Vitals and nursing note reviewed.   Constitutional:       General: She is not in acute distress.     Appearance: Normal appearance. She is not  ill-appearing.   Eyes:      Extraocular Movements: Extraocular movements intact.      Pupils: Pupils are equal, round, and reactive to light.   Cardiovascular:      Rate and Rhythm: Normal rate and regular rhythm.      Heart sounds: Normal heart sounds.   Pulmonary:      Effort: Pulmonary effort is normal.      Breath sounds: Normal breath sounds.   Abdominal:      General: Bowel sounds are normal.      Palpations: Abdomen is soft.   Musculoskeletal:         General: Normal range of motion.   Skin:     Findings: No rash.   Neurological:      General: No focal deficit present.      Mental Status: She is alert and oriented to person, place, and time. Mental status is at baseline.   Psychiatric:         Mood and Affect: Mood normal.         Behavior: Behavior normal.          Assessment and Plan (including Health Maintenance)      Problem List  Smart Sets  Document Outside HM   :    Plan:         There are no preventive care reminders to display for this patient.    Problem List Items Addressed This Visit          ENT    Non-seasonal allergic rhinitis (Chronic)       Pulmonary    Chronic cough (Chronic)    Relevant Medications    levoFLOXacin (LEVAQUIN) 500 MG tablet    predniSONE (DELTASONE) 20 MG tablet    benzonatate (TESSALON) 100 MG capsule    albuterol (VENTOLIN HFA) 90 mcg/actuation inhaler    Other Relevant Orders    X-Ray Chest PA And Lateral (Completed)    Lower respiratory infection - Primary    Relevant Medications    levoFLOXacin (LEVAQUIN) 500 MG tablet    predniSONE (DELTASONE) 20 MG tablet    benzonatate (TESSALON) 100 MG capsule    albuterol (VENTOLIN HFA) 90 mcg/actuation inhaler       Cardiac/Vascular    Essential hypertension (Chronic)       Endocrine    Type 2 diabetes mellitus without complication, without long-term current use of insulin (Chronic)     Other Visit Diagnoses       Wheeze        Relevant Medications    albuterol-ipratropium 2.5 mg-0.5 mg/3 mL nebulizer solution 3 mL (Completed)           Lower respiratory infection  -     levoFLOXacin (LEVAQUIN) 500 MG tablet; Take 1 tablet (500 mg total) by mouth once daily.  Dispense: 7 tablet; Refill: 0  -     predniSONE (DELTASONE) 20 MG tablet; Take 3 tablets (60 mg total) by mouth once daily for 7 days, THEN 2 tablets (40 mg total) once daily for 7 days, THEN 1 tablet (20 mg total) once daily for 7 days, THEN 0.5 tablets (10 mg total) once daily for 7 days.  Dispense: 46 tablet; Refill: 0  -     benzonatate (TESSALON) 100 MG capsule; Take 1 capsule (100 mg total) by mouth 3 (three) times daily as needed for Cough.  Dispense: 30 capsule; Refill: 1  -     albuterol (VENTOLIN HFA) 90 mcg/actuation inhaler; Inhale 2 puffs into the lungs every 6 (six) hours as needed for Wheezing. Or COUGH  Dispense: 18 g; Refill: 5    Chronic cough  -     X-Ray Chest PA And Lateral; Future; Expected date: 11/29/2022  -     levoFLOXacin (LEVAQUIN) 500 MG tablet; Take 1 tablet (500 mg total) by mouth once daily.  Dispense: 7 tablet; Refill: 0  -     predniSONE (DELTASONE) 20 MG tablet; Take 3 tablets (60 mg total) by mouth once daily for 7 days, THEN 2 tablets (40 mg total) once daily for 7 days, THEN 1 tablet (20 mg total) once daily for 7 days, THEN 0.5 tablets (10 mg total) once daily for 7 days.  Dispense: 46 tablet; Refill: 0  -     benzonatate (TESSALON) 100 MG capsule; Take 1 capsule (100 mg total) by mouth 3 (three) times daily as needed for Cough.  Dispense: 30 capsule; Refill: 1  -     albuterol (VENTOLIN HFA) 90 mcg/actuation inhaler; Inhale 2 puffs into the lungs every 6 (six) hours as needed for Wheezing. Or COUGH  Dispense: 18 g; Refill: 5    Non-seasonal allergic rhinitis, unspecified trigger    Wheeze  -     albuterol-ipratropium 2.5 mg-0.5 mg/3 mL nebulizer solution 3 mL    Type 2 diabetes mellitus without complication, without long-term current use of insulin    Essential hypertension     Health Maintenance Topics with due status: Not Due       Topic Last  Completion Date    Colorectal Cancer Screening 02/04/2020    Eye Exam 04/20/2022    Foot Exam 04/25/2022    DEXA Scan 04/29/2022    Lipid Panel 06/30/2022    Diabetes Urine Screening 09/19/2022    Hemoglobin A1c 09/19/2022    Low Dose Statin 11/29/2022       Procedures     Future Appointments   Date Time Provider Department Center   1/4/2023  9:15 AM Rebekah Garcia MD [facility]   1/24/2023 10:15 AM [facility] [place] [facility] M   2/3/2023  3:00 PM AWV NURSE, [facility] FAMILY MEDICINE [facility] LON Wong        Follow up if symptoms worsen or fail to improve.     Signature:  Salty Barahona MD  [address] [place] [address]  Phone #: [phone]  Fax #: [phone]    Date of encounter: 11/29/22    There are no Patient Instructions on file for this visit.

## 2022-12-06 DIAGNOSIS — M81.0 OSTEOPOROSIS, UNSPECIFIED OSTEOPOROSIS TYPE, UNSPECIFIED PATHOLOGICAL FRACTURE PRESENCE: Primary | ICD-10-CM

## 2022-12-09 PROBLEM — J22 LOWER RESPIRATORY INFECTION: Status: ACTIVE | Noted: 2022-12-09

## 2022-12-27 ENCOUNTER — OFFICE VISIT (OUTPATIENT)
Dept: FAMILY MEDICINE | Facility: CLINIC | Age: 77
End: 2022-12-27
Payer: MEDICARE

## 2022-12-27 VITALS
HEIGHT: 59 IN | BODY MASS INDEX: 35.68 KG/M2 | SYSTOLIC BLOOD PRESSURE: 112 MMHG | RESPIRATION RATE: 20 BRPM | TEMPERATURE: 99 F | DIASTOLIC BLOOD PRESSURE: 64 MMHG | HEART RATE: 85 BPM | WEIGHT: 177 LBS | OXYGEN SATURATION: 96 %

## 2022-12-27 DIAGNOSIS — G89.29 CHRONIC LEFT SHOULDER PAIN: ICD-10-CM

## 2022-12-27 DIAGNOSIS — M79.675 PAIN OF LEFT GREAT TOE: Primary | ICD-10-CM

## 2022-12-27 DIAGNOSIS — M25.512 CHRONIC LEFT SHOULDER PAIN: ICD-10-CM

## 2022-12-27 DIAGNOSIS — E11.9 TYPE 2 DIABETES MELLITUS WITHOUT COMPLICATION, WITHOUT LONG-TERM CURRENT USE OF INSULIN: ICD-10-CM

## 2022-12-27 DIAGNOSIS — R73.9 HYPERGLYCEMIA: ICD-10-CM

## 2022-12-27 DIAGNOSIS — M77.8 TENDINITIS OF LEFT SHOULDER: ICD-10-CM

## 2022-12-27 LAB
ANION GAP SERPL CALCULATED.3IONS-SCNC: 10 MMOL/L (ref 7–16)
BUN SERPL-MCNC: 14 MG/DL (ref 7–18)
BUN/CREAT SERPL: 14 (ref 6–20)
CALCIUM SERPL-MCNC: 9.8 MG/DL (ref 8.5–10.1)
CHLORIDE SERPL-SCNC: 103 MMOL/L (ref 98–107)
CO2 SERPL-SCNC: 28 MMOL/L (ref 21–32)
CREAT SERPL-MCNC: 0.97 MG/DL (ref 0.55–1.02)
EGFR (NO RACE VARIABLE) (RUSH/TITUS): 61 ML/MIN/1.73M²
GLUCOSE SERPL-MCNC: 215 MG/DL (ref 74–106)
POTASSIUM SERPL-SCNC: 4.4 MMOL/L (ref 3.5–5.1)
SODIUM SERPL-SCNC: 137 MMOL/L (ref 136–145)

## 2022-12-27 PROCEDURE — 84550 ASSAY OF BLOOD/URIC ACID: CPT | Mod: ,,, | Performed by: CLINICAL MEDICAL LABORATORY

## 2022-12-27 PROCEDURE — 99214 PR OFFICE/OUTPT VISIT, EST, LEVL IV, 30-39 MIN: ICD-10-PCS | Mod: ,,, | Performed by: NURSE PRACTITIONER

## 2022-12-27 PROCEDURE — 96372 THER/PROPH/DIAG INJ SC/IM: CPT | Mod: ,,, | Performed by: NURSE PRACTITIONER

## 2022-12-27 PROCEDURE — 80048 BASIC METABOLIC PNL TOTAL CA: CPT | Mod: ,,, | Performed by: CLINICAL MEDICAL LABORATORY

## 2022-12-27 PROCEDURE — 84550 URIC ACID: ICD-10-PCS | Mod: ,,, | Performed by: CLINICAL MEDICAL LABORATORY

## 2022-12-27 PROCEDURE — 80048 BASIC METABOLIC PANEL: ICD-10-PCS | Mod: ,,, | Performed by: CLINICAL MEDICAL LABORATORY

## 2022-12-27 PROCEDURE — 96372 PR INJECTION,THERAP/PROPH/DIAG2ST, IM OR SUBCUT: ICD-10-PCS | Mod: ,,, | Performed by: NURSE PRACTITIONER

## 2022-12-27 PROCEDURE — 99214 OFFICE O/P EST MOD 30 MIN: CPT | Mod: ,,, | Performed by: NURSE PRACTITIONER

## 2022-12-27 RX ORDER — HYDROCHLOROTHIAZIDE 25 MG/1
1 TABLET ORAL EVERY MORNING
COMMUNITY
End: 2022-12-27 | Stop reason: SDUPTHER

## 2022-12-27 RX ORDER — KETOROLAC TROMETHAMINE 30 MG/ML
30 INJECTION, SOLUTION INTRAMUSCULAR; INTRAVENOUS
Status: COMPLETED | OUTPATIENT
Start: 2022-12-27 | End: 2022-12-27

## 2022-12-27 RX ORDER — TRAVOPROST OPHTHALMIC SOLUTION 0.04 MG/ML
1 SOLUTION OPHTHALMIC NIGHTLY
COMMUNITY
Start: 2022-12-09

## 2022-12-27 RX ADMIN — KETOROLAC TROMETHAMINE 30 MG: 30 INJECTION, SOLUTION INTRAMUSCULAR; INTRAVENOUS at 11:12

## 2022-12-27 NOTE — PROGRESS NOTES
Maria R Kimball DNP, CHARLEE    [address] [place] [address]     PATIENT NAME: Dayanara Monae  : 1945  DATE: 22  MRN: [id number]      Billing Provider: Maria R Kimball DNP, FNP  Level of Service:   Patient PCP Information       Provider PCP Type    Salty Barahona MD General            Reason for Visit / Chief Complaint: Arthritis (Patient complains of arthritis pain in her left shoulder. States she can only raise her arm so high) and Foot Pain (Patient complains of left foot pain. States she bumped her foot on [place]. States it hurts and is red.)       Update PCP  Update Chief Complaint         History of Present Illness / Problem Focused Workflow     Dayanara Monae presents to the clinic with Arthritis (Patient complains of arthritis pain in her left shoulder. States she can only raise her arm so high) and Foot Pain (Patient complains of left foot pain. States she bumped her foot on [place]. States it hurts and is red.)     Arthritis  Pertinent negatives include no diarrhea, dysuria, fatigue, fever or rash.   Foot Pain  Associated symptoms include arthralgias. Pertinent negatives include no abdominal pain, change in bowel habit, chest pain, chills, congestion, coughing, fatigue, fever, headaches, myalgias, nausea, rash, sore throat, vomiting or weakness.     Review of Systems     Review of Systems   Constitutional:  Negative for activity change, appetite change, chills, fatigue and fever.   HENT:  Negative for nasal congestion, ear pain, hearing loss, postnasal drip and sore throat.    Respiratory:  Negative for cough, chest tightness, shortness of breath and wheezing.    Cardiovascular:  Negative for chest pain, palpitations, leg swelling and claudication.   Gastrointestinal:  Negative for abdominal pain, change in bowel habit, constipation, diarrhea, nausea, vomiting and change in bowel habit.   Genitourinary:  Negative for dysuria.    Musculoskeletal:  Positive for arthralgias and arthritis. Negative for back pain, gait problem and myalgias.        Left shoulder and left great toe   Integumentary:  Negative for rash.   Neurological:  Negative for weakness and headaches.   Psychiatric/Behavioral:  Negative for suicidal ideas. The patient is not nervous/anxious.       Medical / Social / Family History     Past Medical History:   Diagnosis Date    Actinic keratoses     Atrial fibrillation     CVA (cerebral vascular accident)     Epilepsy     Non-melanoma skin cancer     Skin cancer        Past Surgical History:   Procedure Laterality Date    APPENDECTOMY      carpel tunnel Right     CHOLECYSTECTOMY      EYE SURGERY      HAND SURGERY      HYSTERECTOMY      JOINT REPLACEMENT         Social History  Ms. Dayanara Monae  reports that she has never smoked. She has never used smokeless tobacco. She reports that she does not drink alcohol and does not use drugs.    Family History  Ms. Dayanara Monae's family history includes Diabetes in her sister; Hypertension in her mother; Stroke in her father.    Medications and Allergies     Medications  Outpatient Medications Marked as Taking for the 12/27/22 encounter (Office Visit) with Maria R Kimball, SHANON, FNP   Medication Sig Dispense Refill    acitretin (SORIATANE) 25 MG capsule Take one 25mg capsule PO daily for Psoriasis 90 capsule 1    albuterol (VENTOLIN HFA) 90 mcg/actuation inhaler Inhale 2 puffs into the lungs every 6 (six) hours as needed for Wheezing. Or COUGH 18 g 5    aspirin (ECOTRIN) 81 MG EC tablet Take 81 mg by mouth once daily.      calcium carbonate (OS-FATOU) 600 mg calcium (1,500 mg) Tab Take 600 mg by mouth.      citalopram (CELEXA) 20 MG tablet Take 1 tablet (20 mg total) by mouth once daily. For MOOD 90 tablet 1    cyanocobalamin (VITAMIN B-12) 1000 MCG tablet Take 1,000 mcg by mouth.      donepeziL (ARICEPT) 5 MG tablet Take 1 tablet (5 mg total) by mouth once daily. For MEMORY 90 tablet 1     DULoxetine (CYMBALTA) 30 MG capsule TAKE 1 CAPSULE BY MOUTH ONCE DAILY FOR  JOINT  PAIN 90 capsule 1    fluticasone propionate (FLONASE) 50 mcg/actuation nasal spray 1 spray (50 mcg total) by Each Nostril route once daily. 16 g 5    hydroCHLOROthiazide (HYDRODIURIL) 25 MG tablet Take 1 tablet (25 mg total) by mouth once daily. For BLOOD PRESSURE 90 tablet 1    loratadine (CLARITIN) 10 mg tablet Take 1 tablet (10 mg total) by mouth once daily. For ALLERGIES 90 tablet 1    metFORMIN (GLUCOPHAGE-XR) 500 MG ER 24hr tablet Take 1 tablet (500 mg total) by mouth 2 (two) times daily with meals. For diabetes 180 tablet 1    metoprolol tartrate (LOPRESSOR) 25 MG tablet Take 1 tablet (25 mg total) by mouth 2 (two) times daily. For HEART 180 tablet 1    omeprazole (PRILOSEC) 40 MG capsule Take 1 capsule (40 mg total) by mouth once daily. For STOMACH 90 capsule 1    potassium chloride (K-TAB) 20 mEq Take 1 tablet (20 mEq total) by mouth once daily at 6am. 90 tablet 1    rosuvastatin (CRESTOR) 20 MG tablet Take 1 tablet (20 mg total) by mouth every evening. For CHOLESTEROL 90 tablet 1    travoprost (TRAVATAN Z) 0.004 % ophthalmic solution Place 1 drop into both eyes every evening.       Current Facility-Administered Medications for the 12/27/22 encounter (Office Visit) with Maria R Kimball DNP, FNP   Medication Dose Route Frequency Provider Last Rate Last Admin    [COMPLETED] ketorolac injection 30 mg  30 mg Intramuscular 1 time in Clinic/HOD Maria R Kimball DNP, FNP   30 mg at 12/27/22 1122       Allergies  Review of patient's allergies indicates:   Allergen Reactions    Vaseline carbolated petroleum Blisters    Adhesive tape-silicones     Bactrim [sulfamethoxazole-trimethoprim]        Physical Examination     Vitals:    12/27/22 1036   BP: 112/64   BP Location: Left arm   Patient Position: Sitting   BP Method: Large (Automatic)   Pulse: 85   Resp: 20   Temp: 98.8 °F (37.1 °C)   TempSrc: Oral   SpO2: 96%   Weight: 80.3 kg (177  "lb)   Height: 4' 11" (1.499 m)     Physical Exam  Vitals and nursing note reviewed.   Constitutional:       General: She is not in acute distress.     Appearance: Normal appearance. She is not ill-appearing.   Eyes:      Extraocular Movements: Extraocular movements intact.      Pupils: Pupils are equal, round, and reactive to light.   Cardiovascular:      Rate and Rhythm: Normal rate and regular rhythm.      Heart sounds: Normal heart sounds.   Pulmonary:      Effort: Pulmonary effort is normal.      Breath sounds: Normal breath sounds.   Abdominal:      General: Bowel sounds are normal.      Palpations: Abdomen is soft.   Musculoskeletal:         General: Normal range of motion.   Skin:     Findings: No rash.   Neurological:      General: No focal deficit present.      Mental Status: She is alert and oriented to person, place, and time. Mental status is at baseline.   Psychiatric:         Mood and Affect: Mood normal.         Behavior: Behavior normal.        Assessment and Plan (including Health Maintenance)      Problem List  Smart Vi[employer]u  Document Outside HM   :    Plan:         Health Maintenance Due   Topic Date Due    Hemoglobin A1c  12/19/2022       Problem List Items Addressed This Visit    None  Visit Diagnoses       Pain of left great toe    -  Primary    Relevant Medications    ketorolac injection 30 mg (Completed)    Other Relevant Orders    Basic Metabolic Panel    Uric Acid    Chronic left shoulder pain        Tendinitis of left shoulder              Pain of left great toe  -     ketorolac injection 30 mg  -     Basic Metabolic Panel; Future; Expected date: 12/27/2022  -     Uric Acid; Future; Expected date: 12/27/2022    Chronic left shoulder pain    Tendinitis of left shoulder       Health Maintenance Topics with due status: Not Due       Topic Last Completion Date    Colorectal Cancer Screening 02/04/2020    Eye Exam 04/20/2022    Foot Exam 04/25/2022    DEXA Scan 04/29/2022    Lipid Panel 06/30/2022 "    Diabetes Urine Screening 09/19/2022    Low Dose Statin 12/27/2022         Future Appointments   Date Time Provider Department Center   1/4/2023  9:15 AM Rebekah Garcia MD [facility]   1/24/2023 10:15 AM INFUSION, [facility] [place] [facility]   2/3/2023  3:00 PM AWV NURSE, [facility] LON Wong        Follow up if symptoms worsen or fail to improve.     Signature:  Maria R Kimball DNP, FNP  [address] [place] [address]  Phone #: [phone]  Fax #: [phone]    Date of encounter: 12/27/22    Patient Instructions   Await labs to rule out gout. Recommend Aleve 2 times a day for 1 week. Ice to shoulder and great toe. Follow up if symptoms persist.

## 2022-12-27 NOTE — PATIENT INSTRUCTIONS
Await labs to rule out gout. Recommend Aleve 2 times a day for 1 week. Ice to shoulder and great toe. Follow up if symptoms persist.

## 2022-12-28 DIAGNOSIS — E11.9 TYPE 2 DIABETES MELLITUS WITHOUT COMPLICATION, WITHOUT LONG-TERM CURRENT USE OF INSULIN: Primary | ICD-10-CM

## 2022-12-28 DIAGNOSIS — R73.9 HYPERGLYCEMIA: ICD-10-CM

## 2022-12-28 LAB
EST. AVERAGE GLUCOSE BLD GHB EST-MCNC: 190 MG/DL
HBA1C MFR BLD HPLC: 8.3 % (ref 4.5–6.6)
URATE SERPL-MCNC: 2.5 MG/DL (ref 2.6–6)

## 2022-12-28 PROCEDURE — 83036 HEMOGLOBIN GLYCOSYLATED A1C: CPT | Mod: ,,, | Performed by: CLINICAL MEDICAL LABORATORY

## 2022-12-28 PROCEDURE — 83036 HEMOGLOBIN A1C: ICD-10-PCS | Mod: ,,, | Performed by: CLINICAL MEDICAL LABORATORY

## 2023-01-04 ENCOUNTER — OFFICE VISIT (OUTPATIENT)
Dept: DERMATOLOGY | Facility: CLINIC | Age: 78
End: 2023-01-04
Payer: MEDICARE

## 2023-01-04 VITALS — BODY MASS INDEX: 35.68 KG/M2 | WEIGHT: 177 LBS | HEIGHT: 59 IN

## 2023-01-04 DIAGNOSIS — B35.1 ONYCHOMYCOSIS: ICD-10-CM

## 2023-01-04 DIAGNOSIS — L40.0 PLAQUE PSORIASIS: Primary | Chronic | ICD-10-CM

## 2023-01-04 DIAGNOSIS — Z79.899 HIGH RISK MEDICATION USE: ICD-10-CM

## 2023-01-04 PROCEDURE — 99214 PR OFFICE/OUTPT VISIT, EST, LEVL IV, 30-39 MIN: ICD-10-PCS | Mod: ,,, | Performed by: DERMATOLOGY

## 2023-01-04 PROCEDURE — 99214 OFFICE O/P EST MOD 30 MIN: CPT | Mod: ,,, | Performed by: DERMATOLOGY

## 2023-01-04 RX ORDER — KETOCONAZOLE 20 MG/G
CREAM TOPICAL DAILY
Qty: 60 G | Refills: 5 | Status: SHIPPED | OUTPATIENT
Start: 2023-01-04 | End: 2023-07-10 | Stop reason: SDUPTHER

## 2023-01-04 RX ORDER — ACITRETIN 25 MG/1
CAPSULE ORAL
Qty: 90 CAPSULE | Refills: 1 | Status: SHIPPED | OUTPATIENT
Start: 2023-01-04 | End: 2023-07-10 | Stop reason: SDUPTHER

## 2023-01-04 NOTE — PROGRESS NOTES
[facility]   Rebekah Garcia MD    Patient Name: Dayanara Monae  Patient YOB: 1945   Date of Service: 1/4/23    CC: Follow-up Psoriasis    HPI: Dayanara Monae is a 77 y.o. female here today for follow-up of psoriasis, last seen 06/2022.  Previous treatments include acitretin .  Overall, the psoriasis is improved.  Treatment plan was followed as directed.    Past Medical History:   Diagnosis Date    Actinic keratoses     Atrial fibrillation     CVA (cerebral vascular accident)     Epilepsy     Non-melanoma skin cancer     Skin cancer      Past Surgical History:   Procedure Laterality Date    APPENDECTOMY      carpel tunnel Right     CHOLECYSTECTOMY      EYE SURGERY      HAND SURGERY      HYSTERECTOMY      JOINT REPLACEMENT       Review of patient's allergies indicates:   Allergen Reactions    Vaseline carbolated petroleum Blisters    Adhesive tape-silicones     Bactrim [sulfamethoxazole-trimethoprim]        Current Outpatient Medications:     albuterol (VENTOLIN HFA) 90 mcg/actuation inhaler, Inhale 2 puffs into the lungs every 6 (six) hours as needed for Wheezing. Or COUGH, Disp: 18 g, Rfl: 5    aspirin (ECOTRIN) 81 MG EC tablet, Take 81 mg by mouth once daily., Disp: , Rfl:     calcium carb-mag hydrox-simeth 1,200 mg-270 mg -80 mg/10 mL Susp, 1 tablet., Disp: , Rfl:     calcium carbonate (OS-FATOU) 600 mg calcium (1,500 mg) Tab, Take 600 mg by mouth., Disp: , Rfl:     citalopram (CELEXA) 20 MG tablet, Take 1 tablet (20 mg total) by mouth once daily. For MOOD, Disp: 90 tablet, Rfl: 1    cyanocobalamin (VITAMIN B-12) 1000 MCG tablet, Take 1,000 mcg by mouth., Disp: , Rfl:     donepeziL (ARICEPT) 5 MG tablet, Take 1 tablet (5 mg total) by mouth once daily. For MEMORY, Disp: 90 tablet, Rfl: 1    DULoxetine (CYMBALTA) 30 MG capsule, TAKE 1 CAPSULE BY MOUTH ONCE DAILY FOR  JOINT  PAIN, Disp: 90 capsule, Rfl: 1    fluticasone propionate (FLONASE) 50 mcg/actuation nasal spray, 1 spray (50 mcg total)  by Each Nostril route once daily., Disp: 16 g, Rfl: 5    hydroCHLOROthiazide (HYDRODIURIL) 25 MG tablet, Take 1 tablet (25 mg total) by mouth once daily. For BLOOD PRESSURE, Disp: 90 tablet, Rfl: 1    loratadine (CLARITIN) 10 mg tablet, Take 1 tablet (10 mg total) by mouth once daily. For ALLERGIES, Disp: 90 tablet, Rfl: 1    metFORMIN (GLUCOPHAGE-XR) 500 MG ER 24hr tablet, Take 1 tablet (500 mg total) by mouth 2 (two) times daily with meals. For diabetes, Disp: 180 tablet, Rfl: 1    metoprolol tartrate (LOPRESSOR) 25 MG tablet, Take 1 tablet (25 mg total) by mouth 2 (two) times daily. For HEART, Disp: 180 tablet, Rfl: 1    moxifloxacin (VIGAMOX) 0.5 % ophthalmic solution, , Disp: , Rfl:     omeprazole (PRILOSEC) 40 MG capsule, Take 1 capsule (40 mg total) by mouth once daily. For STOMACH, Disp: 90 capsule, Rfl: 1    potassium chloride (K-TAB) 20 mEq, Take 1 tablet (20 mEq total) by mouth once daily at 6am., Disp: 90 tablet, Rfl: 1    rosuvastatin (CRESTOR) 20 MG tablet, Take 1 tablet (20 mg total) by mouth every evening. For CHOLESTEROL, Disp: 90 tablet, Rfl: 1    travoprost (TRAVATAN Z) 0.004 % ophthalmic solution, Place 1 drop into both eyes every evening., Disp: , Rfl:     acitretin (SORIATANE) 25 MG capsule, Take one 25mg capsule PO daily for Psoriasis, Disp: 90 capsule, Rfl: 1    ketoconazole (NIZORAL) 2 % cream, Apply topically once daily., Disp: 60 g, Rfl: 5    leucovorin (WELLCOVORIN) 25 MG Tab, Take 25 mg by mouth., Disp: , Rfl:     ROS: A focused review of systems was obtained and negative.     Exam: A focused skin exam was performed. All areas examined were normal except as mentioned in the assessment and plan below.  General Appearance of the patient is well developed and well nourished.  Orientation: alert and oriented x 3.  Mood and affect: pleasant.    Assessment:   The primary encounter diagnosis was Plaque psoriasis. Diagnoses of High risk medication use and Onychomycosis were also pertinent to  this visit.    Plan:   Medications Ordered This Encounter   Medications    acitretin (SORIATANE) 25 MG capsule     Sig: Take one 25mg capsule PO daily for Psoriasis     Dispense:  90 capsule     Refill:  1    ketoconazole (NIZORAL) 2 % cream     Sig: Apply topically once daily.     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  Expectations: Psoriasis is chronic in nature with periods of remissions and flares. Flares can be triggered by stress, infections (group A strep), certain medications and alcohol.  Contact office if: Psoriasis worsens, or fails to improve despite several months of treatment.     - continue acitretin daily  - will monitor nail psoriasis for progression and consider referral to rheumatology (associated with joint disease) and change in therapy if needed      High Risk Medication Monitoring (Z79.899) : The risks and benefits of the medication were reviewed in full with the patient. Should any side effects occur, the patient will stop the medication and contact me immediately.     - will order Lipids, LFTs and CBC, BMP from December reviewed      Soriatane Counseling: I discussed with the patient the risks of acitretin including but not limited to hair loss, dry  lips/skin/eyes, liver damage, hyperlipidemia, depression/suicidal ideation, photosensitivity. Serious rare side  effects can include but are not limited to pancreatitis, pseudotumor cerebri, bony changes, clot  formation/stroke/heart attack. Patient understands that alcohol is contraindicated since it can result in liver toxicity  and significantly prolong the elimination of the drug by many years. Patient understands that this drug is not  meant for any woman of  childbearing age or who plans to get pregnant since it is teratogenic and has a long drug  elimination half life.    Onychomycosis (B35.1)  - discolored nails with onycholysis and subungual debris    Plan: Counseling  I counseled the patient regarding the following:  Skin care: Onychomycosis rarely responds to prolonged use of topical anti-fungal agents. Oral antifungal agents offer a higher cure rate, but relapses occur in 50% of patients.  Expectations: Onychomycosis is a fungal infection of the nail plate. Oral therapy is more effective than topical therapy, but serious side effects such as liver toxicity, bone marrow depression and severe rashes may ensue with systemic treatment.  Contact office if: Patient develops a side effect from treatment.    - pt declines systemic treatment  - will use ketoconazole cream for tinea pedis prevention       Follow up in about 6 months (around 7/4/2023) for FU Psoriasis.    Rebekah Garcia MD

## 2023-01-04 NOTE — PATIENT INSTRUCTIONS
Need labs drawn for: lipids, CMP, LFT  Blood work should be fasting (nothing to eat or drink beforehand).

## 2023-01-10 ENCOUNTER — OFFICE VISIT (OUTPATIENT)
Dept: FAMILY MEDICINE | Facility: CLINIC | Age: 78
End: 2023-01-10
Payer: MEDICARE

## 2023-01-10 VITALS
SYSTOLIC BLOOD PRESSURE: 128 MMHG | RESPIRATION RATE: 16 BRPM | BODY MASS INDEX: 35.16 KG/M2 | WEIGHT: 174.38 LBS | DIASTOLIC BLOOD PRESSURE: 65 MMHG | HEIGHT: 59 IN | HEART RATE: 67 BPM | TEMPERATURE: 98 F | OXYGEN SATURATION: 98 %

## 2023-01-10 DIAGNOSIS — H90.A21 SENSORINEURAL HEARING LOSS (SNHL) OF RIGHT EAR WITH RESTRICTED HEARING OF LEFT EAR: Chronic | ICD-10-CM

## 2023-01-10 DIAGNOSIS — G43.109 MIGRAINE WITH AURA AND WITHOUT STATUS MIGRAINOSUS, NOT INTRACTABLE: Chronic | ICD-10-CM

## 2023-01-10 DIAGNOSIS — L57.0 ACTINIC KERATOSES: Chronic | ICD-10-CM

## 2023-01-10 DIAGNOSIS — I10 ESSENTIAL HYPERTENSION: Chronic | ICD-10-CM

## 2023-01-10 DIAGNOSIS — L40.0 PLAQUE PSORIASIS: Chronic | ICD-10-CM

## 2023-01-10 DIAGNOSIS — R51.9 WORSENING HEADACHES: Chronic | ICD-10-CM

## 2023-01-10 DIAGNOSIS — M81.0 AGE-RELATED OSTEOPOROSIS WITHOUT CURRENT PATHOLOGICAL FRACTURE: Chronic | ICD-10-CM

## 2023-01-10 DIAGNOSIS — E11.9 TYPE 2 DIABETES MELLITUS WITHOUT COMPLICATION, WITHOUT LONG-TERM CURRENT USE OF INSULIN: Primary | Chronic | ICD-10-CM

## 2023-01-10 DIAGNOSIS — R05.3 CHRONIC COUGH: Chronic | ICD-10-CM

## 2023-01-10 DIAGNOSIS — K21.9 GASTROESOPHAGEAL REFLUX DISEASE WITHOUT ESOPHAGITIS: Chronic | ICD-10-CM

## 2023-01-10 DIAGNOSIS — G62.9 NEUROPATHY: Chronic | ICD-10-CM

## 2023-01-10 DIAGNOSIS — E78.2 MIXED HYPERLIPIDEMIA: Chronic | ICD-10-CM

## 2023-01-10 DIAGNOSIS — E53.8 B12 DEFICIENCY: Chronic | ICD-10-CM

## 2023-01-10 PROBLEM — J22 LOWER RESPIRATORY INFECTION: Status: RESOLVED | Noted: 2022-12-09 | Resolved: 2023-01-10

## 2023-01-10 PROCEDURE — 99214 OFFICE O/P EST MOD 30 MIN: CPT | Mod: ,,, | Performed by: FAMILY MEDICINE

## 2023-01-10 PROCEDURE — 99214 PR OFFICE/OUTPT VISIT, EST, LEVL IV, 30-39 MIN: ICD-10-PCS | Mod: ,,, | Performed by: FAMILY MEDICINE

## 2023-01-10 RX ORDER — CLINDAMYCIN HYDROCHLORIDE 300 MG/1
300 CAPSULE ORAL 3 TIMES DAILY
COMMUNITY
Start: 2023-01-05 | End: 2023-02-03

## 2023-01-10 RX ORDER — CHLORHEXIDINE GLUCONATE ORAL RINSE 1.2 MG/ML
15 SOLUTION DENTAL 2 TIMES DAILY
COMMUNITY
Start: 2023-01-05

## 2023-01-10 NOTE — PROGRESS NOTES
Salty Barahona MD    [address] [place] [address]     PATIENT NAME: Dayanara Monae  : 1945  DATE: 1/10/23  MRN: [id number]      Billing Provider: Salty Barahona MD  Level of Service: CT OFFICE/OUTPT VISIT, EST, LEVL IV, 30-39 MIN  Patient PCP Information       Provider PCP Type    Salty Barahona MD General            Reason for Visit / Chief Complaint: Follow-up (Elevated A1C on last visit with Maria R Kimball NP 8.3)       Update PCP  Update Chief Complaint         History of Present Illness / Problem Focused Workflow     Dayanara Monae presents to the clinic with Follow-up (Elevated A1C on last visit with Maria R Kimball NP 8.3)     She was seen a couple of weeks ago and a HbA1c was over 8, 3 months ago her HbA1c was 6.9 which was the first time her average glucose had been to goal over the past couple of years. She reports the holidays did not help her diet but she continues to take her metformin     HPI    Review of Systems     Review of Systems   Constitutional:  Negative for activity change, appetite change, chills, fatigue and fever.   HENT:  Negative for nasal congestion, ear pain, hearing loss, postnasal drip and sore throat.    Respiratory:  Negative for cough, chest tightness, shortness of breath and wheezing.    Cardiovascular:  Negative for chest pain, palpitations, leg swelling and claudication.   Gastrointestinal:  Negative for abdominal pain, change in bowel habit, constipation, diarrhea, nausea, vomiting and change in bowel habit.   Genitourinary:  Negative for dysuria.   Musculoskeletal:  Negative for arthralgias, back pain, gait problem and myalgias.   Integumentary:  Negative for rash.   Neurological:  Negative for weakness and headaches.   Psychiatric/Behavioral:  Negative for suicidal ideas. The patient is not nervous/anxious.       Medical / Social / Family History     Past Medical History:   Diagnosis Date    Actinic keratoses      Atrial fibrillation     CVA (cerebral vascular accident)     Epilepsy     Non-melanoma skin cancer     Skin cancer        Past Surgical History:   Procedure Laterality Date    APPENDECTOMY      carpel tunnel Right     CHOLECYSTECTOMY      EYE SURGERY      HAND SURGERY      HYSTERECTOMY      JOINT REPLACEMENT         Social History  Ms.  reports that she has never smoked. She has never used smokeless tobacco. She reports that she does not drink alcohol and does not use drugs.    Family History  Ms.'s family history includes Diabetes in her sister; Hypertension in her mother; Stroke in her father.    Medications and Allergies     Medications  Outpatient Medications Marked as Taking for the 1/10/23 encounter (Office Visit) with Salty Barahona MD   Medication Sig Dispense Refill    acitretin (SORIATANE) 25 MG capsule Take one 25mg capsule PO daily for Psoriasis 90 capsule 1    albuterol (VENTOLIN HFA) 90 mcg/actuation inhaler Inhale 2 puffs into the lungs every 6 (six) hours as needed for Wheezing. Or COUGH 18 g 5    aspirin (ECOTRIN) 81 MG EC tablet Take 81 mg by mouth once daily.      calcium carb-mag hydrox-simeth 1,200 mg-270 mg -80 mg/10 mL Susp 1 tablet.      calcium carbonate (OS-FATOU) 600 mg calcium (1,500 mg) Tab Take 600 mg by mouth.      chlorhexidine (PERIDEX) 0.12 % solution Use as directed 15 mLs in the mouth or throat 2 (two) times a day.      citalopram (CELEXA) 20 MG tablet Take 1 tablet (20 mg total) by mouth once daily. For MOOD 90 tablet 1    clindamycin (CLEOCIN) 300 MG capsule Take 300 mg by mouth 3 (three) times daily.      cyanocobalamin (VITAMIN B-12) 1000 MCG tablet Take 1,000 mcg by mouth.      donepeziL (ARICEPT) 5 MG tablet Take 1 tablet (5 mg total) by mouth once daily. For MEMORY 90 tablet 1    DULoxetine (CYMBALTA) 30 MG capsule TAKE 1 CAPSULE BY MOUTH ONCE DAILY FOR  JOINT  PAIN 90 capsule 1    fluticasone propionate (FLONASE) 50 mcg/actuation nasal spray 1 spray (50 mcg total) by  Each Nostril route once daily. 16 g 5    hydroCHLOROthiazide (HYDRODIURIL) 25 MG tablet Take 1 tablet (25 mg total) by mouth once daily. For BLOOD PRESSURE 90 tablet 1    ketoconazole (NIZORAL) 2 % cream Apply topically once daily. 60 g 5    leucovorin (WELLCOVORIN) 25 MG Tab Take 25 mg by mouth.      loratadine (CLARITIN) 10 mg tablet Take 1 tablet (10 mg total) by mouth once daily. For ALLERGIES 90 tablet 1    metFORMIN (GLUCOPHAGE-XR) 500 MG ER 24hr tablet Take 1 tablet (500 mg total) by mouth 2 (two) times daily with meals. For diabetes 180 tablet 1    metoprolol tartrate (LOPRESSOR) 25 MG tablet Take 1 tablet (25 mg total) by mouth 2 (two) times daily. For HEART 180 tablet 1    moxifloxacin (VIGAMOX) 0.5 % ophthalmic solution       omeprazole (PRILOSEC) 40 MG capsule Take 1 capsule (40 mg total) by mouth once daily. For STOMACH 90 capsule 1    potassium chloride (K-TAB) 20 mEq Take 1 tablet (20 mEq total) by mouth once daily at 6am. 90 tablet 1    rosuvastatin (CRESTOR) 20 MG tablet Take 1 tablet (20 mg total) by mouth every evening. For CHOLESTEROL 90 tablet 1    travoprost (TRAVATAN Z) 0.004 % ophthalmic solution Place 1 drop into both eyes every evening.         Allergies  Review of patient's allergies indicates:   Allergen Reactions    Vaseline carbolated petroleum Blisters    Adhesive tape-silicones     Bactrim [sulfamethoxazole-trimethoprim]        Physical Examination     Vitals:    01/10/23 1344   BP: 128/65   Pulse: 67   Resp: 16   Temp: 97.8 °F (36.6 °C)     Physical Exam  Vitals and nursing note reviewed.   Constitutional:       General: She is not in acute distress.     Appearance: Normal appearance. She is not ill-appearing.   Eyes:      Extraocular Movements: Extraocular movements intact.      Pupils: Pupils are equal, round, and reactive to light.   Cardiovascular:      Rate and Rhythm: Normal rate and regular rhythm.      Heart sounds: Normal heart sounds.   Pulmonary:      Effort: Pulmonary  effort is normal.      Breath sounds: Normal breath sounds.   Abdominal:      General: Bowel sounds are normal.      Palpations: Abdomen is soft.   Musculoskeletal:         General: Normal range of motion.   Skin:     Findings: No rash.   Neurological:      General: No focal deficit present.      Mental Status: She is alert and oriented to person, place, and time. Mental status is at baseline.   Psychiatric:         Mood and Affect: Mood normal.         Behavior: Behavior normal.          Assessment and Plan (including Health Maintenance)      Problem List  Smart Sets  Document Outside HM   :    Plan:         There are no preventive care reminders to display for this patient.    Problem List Items Addressed This Visit          Neuro    Worsening headaches (Chronic)    Migraine with aura and without status migrainosus, not intractable (Chronic)    Neuropathy (Chronic)       ENT    Sensorineural hearing loss (SNHL) of right ear (Chronic)       Derm    Plaque psoriasis (Chronic)    Overview     acitretin and duobrii         Actinic keratoses (Chronic)       Pulmonary    Chronic cough (Chronic)       Cardiac/Vascular    Essential hypertension (Chronic)    Hyperlipidemia (Chronic)       Endocrine    Type 2 diabetes mellitus without complication, without long-term current use of insulin - Primary (Chronic)    Current Assessment & Plan     She had been on prednisone for several weeks and eating a lot of sweets while on prednisone. She has since stopped prednisone and stopped eating sweets. Her glucometer is broke.          B12 deficiency (Chronic)       GI    Gastroesophageal reflux disease (Chronic)       Orthopedic    Age-related osteoporosis without current pathological fracture (Chronic)     Type 2 diabetes mellitus without complication, without long-term current use of insulin    Worsening headaches    Migraine with aura and without status migrainosus, not intractable    Neuropathy    Sensorineural hearing loss (SNHL)  of right ear with restricted hearing of left ear    Plaque psoriasis    Actinic keratoses    Chronic cough    Essential hypertension    Mixed hyperlipidemia    B12 deficiency    Gastroesophageal reflux disease without esophagitis    Age-related osteoporosis without current pathological fracture       Health Maintenance Topics with due status: Not Due       Topic Last Completion Date    Colorectal Cancer Screening 02/04/2020    Eye Exam 04/20/2022    Foot Exam 04/25/2022    DEXA Scan 04/29/2022    Diabetes Urine Screening 09/19/2022    Hemoglobin A1c 12/28/2022    Lipid Panel 01/05/2023    Low Dose Statin 01/10/2023       Procedures     Future Appointments   Date Time Provider Department Center   1/24/2023 10:15 AM INFUSION, [facility]FUSN [place] [facility]   2/3/2023  3:00 PM AWV NURSE, [facility] FAMILY MEDICINE [facility] LON Wong   4/10/2023  1:15 PM Salty Barahona MD [facility] [place]   7/10/2023  9:45 AM Rebekah Garcia MD [facility]        Follow up in about 3 months (around 4/10/2023) for chronic health problems, diabetes, hypertension.     Signature:  Salty Barahona MD  [address] [place] [address]  Phone #: [phone]  Fax #: [phone]    Date of encounter: 1/10/23    There are no Patient Instructions on file for this visit.

## 2023-01-10 NOTE — ASSESSMENT & PLAN NOTE
She had been on prednisone for several weeks and eating a lot of sweets while on prednisone. She has since stopped prednisone and stopped eating sweets. Her glucometer is broke.

## 2023-01-19 DIAGNOSIS — K21.9 GASTROESOPHAGEAL REFLUX DISEASE, UNSPECIFIED WHETHER ESOPHAGITIS PRESENT: Chronic | ICD-10-CM

## 2023-01-19 DIAGNOSIS — E87.6 LOW BLOOD POTASSIUM: ICD-10-CM

## 2023-01-19 RX ORDER — OMEPRAZOLE 40 MG/1
40 CAPSULE, DELAYED RELEASE ORAL DAILY
Qty: 90 CAPSULE | Refills: 1 | Status: SHIPPED | OUTPATIENT
Start: 2023-01-19 | End: 2023-08-03 | Stop reason: SDUPTHER

## 2023-01-19 RX ORDER — POTASSIUM CHLORIDE 1500 MG/1
20 TABLET, EXTENDED RELEASE ORAL DAILY
Qty: 90 TABLET | Refills: 1 | Status: SHIPPED | OUTPATIENT
Start: 2023-01-19 | End: 2023-08-03 | Stop reason: SDUPTHER

## 2023-01-19 NOTE — TELEPHONE ENCOUNTER
----- Message from Tamika Claudio sent at 1/19/2023 12:34 PM CST -----  Please send for refill of pts omeprazole (PRILOSEC) and potassium to [facility] in [place]

## 2023-01-24 ENCOUNTER — INFUSION (OUTPATIENT)
Dept: INFUSION THERAPY | Facility: HOSPITAL | Age: 78
End: 2023-01-24
Attending: FAMILY MEDICINE
Payer: MEDICARE

## 2023-01-24 VITALS
SYSTOLIC BLOOD PRESSURE: 130 MMHG | HEIGHT: 59 IN | OXYGEN SATURATION: 96 % | DIASTOLIC BLOOD PRESSURE: 64 MMHG | WEIGHT: 179.19 LBS | TEMPERATURE: 98 F | HEART RATE: 65 BPM | RESPIRATION RATE: 18 BRPM | BODY MASS INDEX: 36.12 KG/M2

## 2023-01-24 DIAGNOSIS — M81.0 OSTEOPOROSIS, UNSPECIFIED OSTEOPOROSIS TYPE, UNSPECIFIED PATHOLOGICAL FRACTURE PRESENCE: Primary | ICD-10-CM

## 2023-01-24 DIAGNOSIS — M81.0 AGE-RELATED OSTEOPOROSIS WITHOUT CURRENT PATHOLOGICAL FRACTURE: ICD-10-CM

## 2023-01-24 PROCEDURE — 96372 THER/PROPH/DIAG INJ SC/IM: CPT

## 2023-01-24 PROCEDURE — 63600175 PHARM REV CODE 636 W HCPCS: Performed by: FAMILY MEDICINE

## 2023-01-24 RX ADMIN — DENOSUMAB 60 MG: 60 INJECTION SUBCUTANEOUS at 10:01

## 2023-01-24 NOTE — PROGRESS NOTES
1003 Pt in room 2.  Calcium level checked within last 30 days was 9.8.  Pt stated that she is taking her Calcium with Vitamin D.     1012 Administered Prolia 60mg SQ to right upper arm per protocol.  Pt tolerated well.    1032 No adverse reaction noted.  D/C home, ambulatory with appointment in hand to return in 6 months for next Prolia injection.

## 2023-02-03 ENCOUNTER — OFFICE VISIT (OUTPATIENT)
Dept: FAMILY MEDICINE | Facility: CLINIC | Age: 78
End: 2023-02-03
Payer: MEDICARE

## 2023-02-03 VITALS
DIASTOLIC BLOOD PRESSURE: 60 MMHG | WEIGHT: 174 LBS | SYSTOLIC BLOOD PRESSURE: 110 MMHG | HEIGHT: 59 IN | BODY MASS INDEX: 35.08 KG/M2 | OXYGEN SATURATION: 98 % | TEMPERATURE: 98 F | HEART RATE: 55 BPM | RESPIRATION RATE: 16 BRPM

## 2023-02-03 DIAGNOSIS — I10 ESSENTIAL HYPERTENSION: Chronic | ICD-10-CM

## 2023-02-03 DIAGNOSIS — I70.0 AORTIC ATHEROSCLEROSIS: Chronic | ICD-10-CM

## 2023-02-03 DIAGNOSIS — E66.01 CLASS 2 SEVERE OBESITY DUE TO EXCESS CALORIES WITH SERIOUS COMORBIDITY AND BODY MASS INDEX (BMI) OF 35.0 TO 35.9 IN ADULT: Chronic | ICD-10-CM

## 2023-02-03 DIAGNOSIS — G56.03 BILATERAL CARPAL TUNNEL SYNDROME: Chronic | ICD-10-CM

## 2023-02-03 DIAGNOSIS — J30.89 NON-SEASONAL ALLERGIC RHINITIS, UNSPECIFIED TRIGGER: Chronic | ICD-10-CM

## 2023-02-03 DIAGNOSIS — G43.109 MIGRAINE WITH AURA AND WITHOUT STATUS MIGRAINOSUS, NOT INTRACTABLE: Chronic | ICD-10-CM

## 2023-02-03 DIAGNOSIS — L57.0 ACTINIC KERATOSES: Chronic | ICD-10-CM

## 2023-02-03 DIAGNOSIS — R51.9 WORSENING HEADACHES: Chronic | ICD-10-CM

## 2023-02-03 DIAGNOSIS — R05.3 CHRONIC COUGH: Chronic | ICD-10-CM

## 2023-02-03 DIAGNOSIS — Z00.00 ENCOUNTER FOR SUBSEQUENT ANNUAL WELLNESS VISIT (AWV) IN MEDICARE PATIENT: Primary | ICD-10-CM

## 2023-02-03 DIAGNOSIS — E11.9 TYPE 2 DIABETES MELLITUS WITHOUT COMPLICATION, WITHOUT LONG-TERM CURRENT USE OF INSULIN: Chronic | ICD-10-CM

## 2023-02-03 DIAGNOSIS — E78.2 MIXED HYPERLIPIDEMIA: Chronic | ICD-10-CM

## 2023-02-03 DIAGNOSIS — G62.9 NEUROPATHY: Chronic | ICD-10-CM

## 2023-02-03 DIAGNOSIS — E53.8 B12 DEFICIENCY: Chronic | ICD-10-CM

## 2023-02-03 DIAGNOSIS — L40.0 PLAQUE PSORIASIS: Chronic | ICD-10-CM

## 2023-02-03 DIAGNOSIS — H90.A21 SENSORINEURAL HEARING LOSS (SNHL) OF RIGHT EAR WITH RESTRICTED HEARING OF LEFT EAR: Chronic | ICD-10-CM

## 2023-02-03 DIAGNOSIS — F03.918 DEMENTIA WITH BEHAVIORAL DISTURBANCE: Chronic | ICD-10-CM

## 2023-02-03 DIAGNOSIS — F33.41 RECURRENT MAJOR DEPRESSIVE DISORDER, IN PARTIAL REMISSION: Chronic | ICD-10-CM

## 2023-02-03 DIAGNOSIS — M81.0 AGE-RELATED OSTEOPOROSIS WITHOUT CURRENT PATHOLOGICAL FRACTURE: Chronic | ICD-10-CM

## 2023-02-03 DIAGNOSIS — K21.9 GASTROESOPHAGEAL REFLUX DISEASE WITHOUT ESOPHAGITIS: Chronic | ICD-10-CM

## 2023-02-03 DIAGNOSIS — K42.9 PERIUMBILICAL HERNIA: Chronic | ICD-10-CM

## 2023-02-03 DIAGNOSIS — M54.2 NECK PAIN: Chronic | ICD-10-CM

## 2023-02-03 PROBLEM — E66.812 CLASS 2 SEVERE OBESITY DUE TO EXCESS CALORIES WITH SERIOUS COMORBIDITY AND BODY MASS INDEX (BMI) OF 35.0 TO 35.9 IN ADULT: Chronic | Status: ACTIVE | Noted: 2023-02-03

## 2023-02-03 PROCEDURE — G0008 ADMIN INFLUENZA VIRUS VAC: HCPCS | Mod: ,,, | Performed by: FAMILY MEDICINE

## 2023-02-03 PROCEDURE — 90694 FLU VACCINE - ADJUVANTED: ICD-10-PCS | Mod: ,,, | Performed by: FAMILY MEDICINE

## 2023-02-03 PROCEDURE — G0439 PR MEDICARE ANNUAL WELLNESS SUBSEQUENT VISIT: ICD-10-PCS | Mod: ,,, | Performed by: FAMILY MEDICINE

## 2023-02-03 PROCEDURE — G0008 PR ADMIN INFLUENZA VIRUS VAC: ICD-10-PCS | Mod: ,,, | Performed by: FAMILY MEDICINE

## 2023-02-03 PROCEDURE — 90694 VACC AIIV4 NO PRSRV 0.5ML IM: CPT | Mod: ,,, | Performed by: FAMILY MEDICINE

## 2023-02-03 PROCEDURE — G0439 PPPS, SUBSEQ VISIT: HCPCS | Mod: ,,, | Performed by: FAMILY MEDICINE

## 2023-02-03 RX ORDER — DONEPEZIL HYDROCHLORIDE 5 MG/1
5 TABLET, FILM COATED ORAL DAILY
Qty: 90 TABLET | Refills: 1 | Status: SHIPPED | OUTPATIENT
Start: 2023-02-03 | End: 2023-04-28 | Stop reason: SDUPTHER

## 2023-02-03 RX ORDER — ROSUVASTATIN CALCIUM 20 MG/1
20 TABLET, COATED ORAL NIGHTLY
Qty: 90 TABLET | Refills: 1 | Status: SHIPPED | OUTPATIENT
Start: 2023-02-03 | End: 2023-09-01 | Stop reason: SDUPTHER

## 2023-02-03 RX ORDER — DULOXETIN HYDROCHLORIDE 30 MG/1
CAPSULE, DELAYED RELEASE ORAL
Qty: 90 CAPSULE | Refills: 1 | Status: SHIPPED | OUTPATIENT
Start: 2023-02-03 | End: 2023-09-28 | Stop reason: SDUPTHER

## 2023-02-03 RX ORDER — HYDROCHLOROTHIAZIDE 25 MG/1
25 TABLET ORAL DAILY
Qty: 90 TABLET | Refills: 1 | Status: SHIPPED | OUTPATIENT
Start: 2023-02-03 | End: 2023-09-01 | Stop reason: SDUPTHER

## 2023-02-03 RX ORDER — CITALOPRAM 20 MG/1
20 TABLET, FILM COATED ORAL DAILY
Qty: 90 TABLET | Refills: 1 | Status: SHIPPED | OUTPATIENT
Start: 2023-02-03

## 2023-02-03 RX ORDER — METFORMIN HYDROCHLORIDE 500 MG/1
500 TABLET, EXTENDED RELEASE ORAL 2 TIMES DAILY WITH MEALS
Qty: 180 TABLET | Refills: 1 | Status: SHIPPED | OUTPATIENT
Start: 2023-02-03 | End: 2023-09-01 | Stop reason: SDUPTHER

## 2023-02-03 NOTE — PATIENT INSTRUCTIONS
Counseling and Referral of Other Preventative  (Italic type indicates deductible and co-insurance are waived)    Patient Name: Dayanara Monae  Today's Date: 2/3/2023    Health Maintenance         Date Due Completion Date    Influenza Vaccine (1) 06/30/2023 (Originally 9/1/2022) 1/31/2022    COVID-19 Vaccine (4 - Booster) 10/31/2023 (Originally 4/1/2022) 2/4/2022    Shingles Vaccine (1 of 2) 02/03/2024 (Originally 10/27/1997) ---    Hemoglobin A1c 03/28/2023 12/28/2022    Foot Exam 04/25/2023 4/25/2022 (Done)    Override on 4/25/2022: Done    Eye Exam 06/16/2023 6/16/2022    Override on 4/20/2022: Done    Diabetes Urine Screening 09/19/2023 9/19/2022    Lipid Panel 01/05/2024 1/5/2023    Low Dose Statin 02/03/2024 2/3/2023    DEXA Scan 04/29/2025 4/29/2022    TETANUS VACCINE 05/19/2025 5/19/2015    Colorectal Cancer Screening 02/04/2030 2/4/2020          Orders Placed This Encounter   Procedures    Influenza - Trivalent (Adjuvanted)

## 2023-02-03 NOTE — ASSESSMENT & PLAN NOTE
- Check glucose outside of clinic, record numbers, and bring log to follow up visit.    - Take medications as directed, and bring all medications to every clinic appointment.    - Eat a diabetic diet, if there are concerns about what that entails, we have a diabetic educator in our clinic.    - Cardiovascular exercise at least 3 times per week, for at least 15 minutes.    - Patient should be on a Statin medication, unless patient cannot tolerate a Statin.    - Aspirin should be taken everyday,  81mg, unless a higher dose is indicated for other medicaiton conditions. Also do not recommend Aspirin for a patient with known adverse effects from Aspirin.    - Recommend yearly, dilated eye exams for all Diabetic Patients.    - Monitor feet for calluses, abrasion, or other abnormalities. Report any concerns at every clinic visit.    -   Hemoglobin A1C   Date Value Ref Range Status   12/28/2022 8.3 (H) 4.5 - 6.6 % Final     Comment:       Normal:               <5.7%  Pre-Diabetic:       5.7% to 6.4%  Diabetic:             >6.4%  Diabetic Goal:     <7%   09/19/2022 6.9 (H) 4.5 - 6.6 % Final     Comment:       Normal:               <5.7%  Pre-Diabetic:       5.7% to 6.4%  Diabetic:             >6.4%  Diabetic Goal:     <7%   04/25/2022 7.9 (H) 4.5 - 6.6 % Final     Comment:       Normal:               <5.7%  Pre-Diabetic:       5.7% to 6.4%  Diabetic:             >6.4%  Diabetic Goal:     <7%

## 2023-02-10 ENCOUNTER — IMMUNIZATION (OUTPATIENT)
Dept: FAMILY MEDICINE | Facility: CLINIC | Age: 78
End: 2023-02-10
Payer: MEDICARE

## 2023-02-10 NOTE — PROGRESS NOTES
Administered pts Moderna Booster 0.5mL in the left deltoid. Pt tolerated well. No reaction after shot time.

## 2023-03-07 ENCOUNTER — OFFICE VISIT (OUTPATIENT)
Dept: FAMILY MEDICINE | Facility: CLINIC | Age: 78
End: 2023-03-07
Payer: MEDICARE

## 2023-03-07 VITALS
DIASTOLIC BLOOD PRESSURE: 67 MMHG | SYSTOLIC BLOOD PRESSURE: 132 MMHG | HEIGHT: 59 IN | WEIGHT: 177.38 LBS | HEART RATE: 61 BPM | TEMPERATURE: 97 F | RESPIRATION RATE: 16 BRPM | OXYGEN SATURATION: 97 % | BODY MASS INDEX: 35.76 KG/M2

## 2023-03-07 DIAGNOSIS — M79.10 MYALGIA: Primary | ICD-10-CM

## 2023-03-07 PROCEDURE — 99213 OFFICE O/P EST LOW 20 MIN: CPT | Mod: ,,, | Performed by: FAMILY MEDICINE

## 2023-03-07 PROCEDURE — 99213 PR OFFICE/OUTPT VISIT, EST, LEVL III, 20-29 MIN: ICD-10-PCS | Mod: ,,, | Performed by: FAMILY MEDICINE

## 2023-03-07 RX ORDER — METHOCARBAMOL 500 MG/1
500 TABLET, FILM COATED ORAL 3 TIMES DAILY PRN
Qty: 60 TABLET | Refills: 1 | Status: SHIPPED | OUTPATIENT
Start: 2023-03-07 | End: 2023-03-17

## 2023-03-13 PROBLEM — M79.10 MYALGIA: Status: ACTIVE | Noted: 2023-03-13

## 2023-04-10 ENCOUNTER — OFFICE VISIT (OUTPATIENT)
Dept: FAMILY MEDICINE | Facility: CLINIC | Age: 78
End: 2023-04-10
Payer: MEDICARE

## 2023-04-10 VITALS
BODY MASS INDEX: 36.41 KG/M2 | DIASTOLIC BLOOD PRESSURE: 61 MMHG | HEIGHT: 59 IN | SYSTOLIC BLOOD PRESSURE: 125 MMHG | OXYGEN SATURATION: 95 % | WEIGHT: 180.63 LBS | RESPIRATION RATE: 16 BRPM | TEMPERATURE: 98 F | HEART RATE: 76 BPM

## 2023-04-10 DIAGNOSIS — E11.65 TYPE 2 DIABETES MELLITUS WITH HYPERGLYCEMIA, WITHOUT LONG-TERM CURRENT USE OF INSULIN: Primary | Chronic | ICD-10-CM

## 2023-04-10 LAB
EST. AVERAGE GLUCOSE BLD GHB EST-MCNC: 154 MG/DL
HBA1C MFR BLD HPLC: 7.2 % (ref 4.5–6.6)

## 2023-04-10 PROCEDURE — 83036 HEMOGLOBIN A1C: ICD-10-PCS | Mod: ,,, | Performed by: CLINICAL MEDICAL LABORATORY

## 2023-04-10 PROCEDURE — 99214 PR OFFICE/OUTPT VISIT, EST, LEVL IV, 30-39 MIN: ICD-10-PCS | Mod: ,,, | Performed by: FAMILY MEDICINE

## 2023-04-10 PROCEDURE — 83036 HEMOGLOBIN GLYCOSYLATED A1C: CPT | Mod: ,,, | Performed by: CLINICAL MEDICAL LABORATORY

## 2023-04-10 PROCEDURE — 99214 OFFICE O/P EST MOD 30 MIN: CPT | Mod: ,,, | Performed by: FAMILY MEDICINE

## 2023-04-10 RX ORDER — INSULIN PUMP SYRINGE, 3 ML
1 EACH MISCELLANEOUS DAILY
Qty: 1 EACH | Refills: 0 | Status: SHIPPED | OUTPATIENT
Start: 2023-04-10 | End: 2023-04-11 | Stop reason: SDUPTHER

## 2023-04-10 RX ORDER — LANCETS 33 GAUGE
1 EACH MISCELLANEOUS DAILY
COMMUNITY
End: 2023-04-10 | Stop reason: SDUPTHER

## 2023-04-10 RX ORDER — LANCETS 33 GAUGE
1 EACH MISCELLANEOUS DAILY
Qty: 100 EACH | Refills: 1 | Status: SHIPPED | OUTPATIENT
Start: 2023-04-10 | End: 2023-04-11 | Stop reason: SDUPTHER

## 2023-04-10 NOTE — PROGRESS NOTES
Salty Barahona MD    [address] [place] [address]     PATIENT NAME: Dayanara Monae  : 1945  DATE: 4/10/23  MRN: [id number]      Billing Provider: Salty Barahona MD  Level of Service: ID OFFICE/OUTPT VISIT, EST, LEVL IV, 30-39 MIN  Patient PCP Information       Provider PCP Type    Salty Barahona MD General            Reason for Visit / Chief Complaint: Diabetes (3 month follow up)       Update PCP  Update Chief Complaint         History of Present Illness / Problem Focused Workflow     Dayanara Monae presents to the clinic with Diabetes (3 month follow up)     Diabetes was much worse the previous lab than the times before. She reports she has not been checking her glucose. Her meter is broke.     HPI    Review of Systems     Review of Systems   Constitutional:  Negative for activity change, appetite change, chills, fatigue and fever.   HENT:  Negative for nasal congestion, ear pain, hearing loss, postnasal drip and sore throat.    Respiratory:  Negative for cough, chest tightness, shortness of breath and wheezing.    Cardiovascular:  Negative for chest pain, palpitations, leg swelling and claudication.   Gastrointestinal:  Negative for abdominal pain, change in bowel habit, constipation, diarrhea, nausea, vomiting and change in bowel habit.   Genitourinary:  Negative for dysuria.   Musculoskeletal:  Negative for arthralgias, back pain, gait problem and myalgias.   Integumentary:  Negative for rash.   Neurological:  Negative for weakness and headaches.   Psychiatric/Behavioral:  Negative for suicidal ideas. The patient is not nervous/anxious.       Medical / Social / Family History     Past Medical History:   Diagnosis Date    Actinic keratoses     Atrial fibrillation     CVA (cerebral vascular accident)     Epilepsy     Non-melanoma skin cancer     Osteoporosis     Skin cancer        Past Surgical History:   Procedure Laterality Date     APPENDECTOMY      carpel tunnel Right     CHOLECYSTECTOMY      EYE SURGERY      HAND SURGERY      HYSTERECTOMY      JOINT REPLACEMENT         Social History  Ms.  reports that she has never smoked. She has never been exposed to tobacco smoke. She has never used smokeless tobacco. She reports that she does not drink alcohol and does not use drugs.    Family History  Ms.'s family history includes Diabetes in her sister; Hypertension in her mother; Stroke in her father.    Medications and Allergies     Medications  Outpatient Medications Marked as Taking for the 4/10/23 encounter (Office Visit) with Salty Barahona MD   Medication Sig Dispense Refill    acitretin (SORIATANE) 25 MG capsule Take one 25mg capsule PO daily for Psoriasis 90 capsule 1    albuterol (VENTOLIN HFA) 90 mcg/actuation inhaler Inhale 2 puffs into the lungs every 6 (six) hours as needed for Wheezing. Or COUGH 18 g 5    aspirin (ECOTRIN) 81 MG EC tablet Take 81 mg by mouth once daily.      calcium carb-mag hydrox-simeth 1,200 mg-270 mg -80 mg/10 mL Susp 1 tablet.      calcium carbonate (OS-FATOU) 600 mg calcium (1,500 mg) Tab Take 600 mg by mouth.      chlorhexidine (PERIDEX) 0.12 % solution Use as directed 15 mLs in the mouth or throat 2 (two) times a day.      citalopram (CELEXA) 20 MG tablet Take 1 tablet (20 mg total) by mouth once daily. For MOOD 90 tablet 1    cyanocobalamin (VITAMIN B-12) 1000 MCG tablet Take 1,000 mcg by mouth.      donepeziL (ARICEPT) 5 MG tablet Take 1 tablet (5 mg total) by mouth once daily. For MEMORY 90 tablet 1    DULoxetine (CYMBALTA) 30 MG capsule TAKE 1 CAPSULE BY MOUTH ONCE DAILY FOR  JOINT  PAIN 90 capsule 1    fluticasone propionate (FLONASE) 50 mcg/actuation nasal spray 1 spray (50 mcg total) by Each Nostril route once daily. 16 g 5    hydroCHLOROthiazide (HYDRODIURIL) 25 MG tablet Take 1 tablet (25 mg total) by mouth once daily. For BLOOD PRESSURE 90 tablet 1    ketoconazole (NIZORAL) 2 % cream Apply topically  once daily. 60 g 5    leucovorin (WELLCOVORIN) 25 MG Tab Take 25 mg by mouth.      metFORMIN (GLUCOPHAGE-XR) 500 MG ER 24hr tablet Take 1 tablet (500 mg total) by mouth 2 (two) times daily with meals. For diabetes 180 tablet 1    metoprolol tartrate (LOPRESSOR) 25 MG tablet Take 1 tablet (25 mg total) by mouth 2 (two) times daily. For HEART 180 tablet 1    moxifloxacin (VIGAMOX) 0.5 % ophthalmic solution       omeprazole (PRILOSEC) 40 MG capsule Take 1 capsule (40 mg total) by mouth once daily. For STOMACH 90 capsule 1    potassium chloride (K-TAB) 20 mEq Take 1 tablet (20 mEq total) by mouth once daily. 90 tablet 1    rosuvastatin (CRESTOR) 20 MG tablet Take 1 tablet (20 mg total) by mouth every evening. For CHOLESTEROL 90 tablet 1    travoprost (TRAVATAN Z) 0.004 % ophthalmic solution Place 1 drop into both eyes every evening.         Allergies  Review of patient's allergies indicates:   Allergen Reactions    Vaseline carbolated petroleum Blisters    Adhesive tape-silicones     Bactrim [sulfamethoxazole-trimethoprim]        Physical Examination     Vitals:    04/10/23 1229   BP: 125/61   Pulse: 76   Resp: 16   Temp: 97.6 °F (36.4 °C)     Physical Exam  Vitals and nursing note reviewed.   Constitutional:       General: She is not in acute distress.     Appearance: Normal appearance. She is not ill-appearing.   Eyes:      Extraocular Movements: Extraocular movements intact.      Pupils: Pupils are equal, round, and reactive to light.   Cardiovascular:      Rate and Rhythm: Normal rate and regular rhythm.      Heart sounds: Normal heart sounds.   Pulmonary:      Effort: Pulmonary effort is normal.      Breath sounds: Normal breath sounds.   Abdominal:      General: Bowel sounds are normal.      Palpations: Abdomen is soft.   Musculoskeletal:         General: Normal range of motion.   Skin:     Findings: No rash.   Neurological:      General: No focal deficit present.      Mental Status: She is alert and oriented to  person, place, and time. Mental status is at baseline.   Psychiatric:         Mood and Affect: Mood normal.         Behavior: Behavior normal.          Assessment and Plan (including Health Maintenance)      Problem List  Smart Sets  Document Outside HM   :    Plan:         Health Maintenance Due   Topic Date Due    Foot Exam  04/25/2023       Problem List Items Addressed This Visit          Endocrine    Type 2 diabetes mellitus with hyperglycemia, without long-term current use of insulin - Primary (Chronic)    Current Assessment & Plan     Repeat HbA1c today. She needs her test strips and lancets refilled as well.      - Check glucose outside of clinic, record numbers, and bring log to follow up visit.    - Take medications as directed, and bring all medications to every clinic appointment.    - Eat a diabetic diet, if there are concerns about what that entails, we have a diabetic educator in our clinic.    - Cardiovascular exercise at least 3 times per week, for at least 15 minutes.    - Patient should be on a Statin medication, unless patient cannot tolerate a Statin.    - Aspirin should be taken everyday,  81mg, unless a higher dose is indicated for other medicaiton conditions. Also do not recommend Aspirin for a patient with known adverse effects from Aspirin.    - Recommend yearly, dilated eye exams for all Diabetic Patients.    - Monitor feet for calluses, abrasion, or other abnormalities. Report any concerns at every clinic visit.    -   Hemoglobin A1C   Date Value Ref Range Status   12/28/2022 8.3 (H) 4.5 - 6.6 % Final     Comment:       Normal:               <5.7%  Pre-Diabetic:       5.7% to 6.4%  Diabetic:             >6.4%  Diabetic Goal:     <7%   09/19/2022 6.9 (H) 4.5 - 6.6 % Final     Comment:       Normal:               <5.7%  Pre-Diabetic:       5.7% to 6.4%  Diabetic:             >6.4%  Diabetic Goal:     <7%   04/25/2022 7.9 (H) 4.5 - 6.6 % Final     Comment:       Normal:                <5.7%  Pre-Diabetic:       5.7% to 6.4%  Diabetic:             >6.4%  Diabetic Goal:     <7%               Relevant Medications    blood sugar diagnostic (BLOOD GLUCOSE TEST) Strp    lancets 33 gauge Misc    blood-glucose meter kit    Other Relevant Orders    Hemoglobin A1C       Health Maintenance Topics with due status: Not Due       Topic Last Completion Date    TETANUS VACCINE 05/19/2015    Colorectal Cancer Screening 02/04/2020    DEXA Scan 04/29/2022    Diabetes Urine Screening 09/19/2022    Lipid Panel 01/05/2023    Eye Exam 01/19/2023       Procedures     Future Appointments   Date Time Provider Department Center   7/10/2023  9:45 AM Rebekah Garcia MD [facility]   7/25/2023  9:30 AM INFUSION, [facility] INFUSN [place] [facility]   2/9/2024  3:00 PM AWV NURSE, [facility] FAMILY MEDICINE [facility] LON Wong        Follow up in about 3 months (around 7/10/2023) for chronic health problems, diabetes.     Signature:  Salty Barahona MD  [address] [place] [address]  Phone #: [phone]  Fax #: [phone]    Date of encounter: 4/10/23    There are no Patient Instructions on file for this visit.

## 2023-04-10 NOTE — ASSESSMENT & PLAN NOTE
Repeat HbA1c today. She needs her test strips and lancets refilled as well.      - Check glucose outside of clinic, record numbers, and bring log to follow up visit.    - Take medications as directed, and bring all medications to every clinic appointment.    - Eat a diabetic diet, if there are concerns about what that entails, we have a diabetic educator in our clinic.    - Cardiovascular exercise at least 3 times per week, for at least 15 minutes.    - Patient should be on a Statin medication, unless patient cannot tolerate a Statin.    - Aspirin should be taken everyday,  81mg, unless a higher dose is indicated for other medicaiton conditions. Also do not recommend Aspirin for a patient with known adverse effects from Aspirin.    - Recommend yearly, dilated eye exams for all Diabetic Patients.    - Monitor feet for calluses, abrasion, or other abnormalities. Report any concerns at every clinic visit.    -   Hemoglobin A1C   Date Value Ref Range Status   12/28/2022 8.3 (H) 4.5 - 6.6 % Final     Comment:       Normal:               <5.7%  Pre-Diabetic:       5.7% to 6.4%  Diabetic:             >6.4%  Diabetic Goal:     <7%   09/19/2022 6.9 (H) 4.5 - 6.6 % Final     Comment:       Normal:               <5.7%  Pre-Diabetic:       5.7% to 6.4%  Diabetic:             >6.4%  Diabetic Goal:     <7%   04/25/2022 7.9 (H) 4.5 - 6.6 % Final     Comment:       Normal:               <5.7%  Pre-Diabetic:       5.7% to 6.4%  Diabetic:             >6.4%  Diabetic Goal:     <7%

## 2023-04-11 DIAGNOSIS — E11.65 TYPE 2 DIABETES MELLITUS WITH HYPERGLYCEMIA, WITHOUT LONG-TERM CURRENT USE OF INSULIN: Chronic | ICD-10-CM

## 2023-04-11 RX ORDER — LANCETS 33 GAUGE
1 EACH MISCELLANEOUS DAILY
Qty: 100 EACH | Refills: 1 | Status: SHIPPED | OUTPATIENT
Start: 2023-04-11

## 2023-04-11 RX ORDER — INSULIN PUMP SYRINGE, 3 ML
1 EACH MISCELLANEOUS DAILY
Qty: 1 EACH | Refills: 0 | Status: SHIPPED | OUTPATIENT
Start: 2023-04-11 | End: 2023-09-25

## 2023-04-28 DIAGNOSIS — F03.918 DEMENTIA WITH BEHAVIORAL DISTURBANCE: Chronic | ICD-10-CM

## 2023-04-28 RX ORDER — DONEPEZIL HYDROCHLORIDE 5 MG/1
5 TABLET, FILM COATED ORAL DAILY
Qty: 90 TABLET | Refills: 1 | Status: SHIPPED | OUTPATIENT
Start: 2023-04-28 | End: 2023-09-01 | Stop reason: SDUPTHER

## 2023-05-09 DIAGNOSIS — Z71.89 COMPLEX CARE COORDINATION: ICD-10-CM

## 2023-07-10 ENCOUNTER — OFFICE VISIT (OUTPATIENT)
Dept: DERMATOLOGY | Facility: CLINIC | Age: 78
End: 2023-07-10
Payer: MEDICARE

## 2023-07-10 VITALS — BODY MASS INDEX: 36.4 KG/M2 | HEIGHT: 59 IN | WEIGHT: 180.56 LBS

## 2023-07-10 DIAGNOSIS — B35.1 ONYCHOMYCOSIS: ICD-10-CM

## 2023-07-10 DIAGNOSIS — L40.0 PLAQUE PSORIASIS: Primary | ICD-10-CM

## 2023-07-10 DIAGNOSIS — Z79.899 HIGH RISK MEDICATION USE: ICD-10-CM

## 2023-07-10 DIAGNOSIS — L40.0 PLAQUE PSORIASIS: Chronic | ICD-10-CM

## 2023-07-10 PROCEDURE — 99214 OFFICE O/P EST MOD 30 MIN: CPT | Mod: ,,, | Performed by: DERMATOLOGY

## 2023-07-10 PROCEDURE — 99214 PR OFFICE/OUTPT VISIT, EST, LEVL IV, 30-39 MIN: ICD-10-PCS | Mod: ,,, | Performed by: DERMATOLOGY

## 2023-07-10 RX ORDER — ACITRETIN 25 MG/1
CAPSULE ORAL
Qty: 90 CAPSULE | Refills: 1 | Status: SHIPPED | OUTPATIENT
Start: 2023-07-10 | End: 2024-01-10 | Stop reason: SDUPTHER

## 2023-07-10 RX ORDER — KETOCONAZOLE 20 MG/G
CREAM TOPICAL DAILY
Qty: 60 G | Refills: 5 | Status: SHIPPED | OUTPATIENT
Start: 2023-07-10

## 2023-07-10 RX ORDER — CLOBETASOL PROPIONATE 0.5 MG/G
CREAM TOPICAL
Qty: 60 G | Refills: 6 | Status: SHIPPED | OUTPATIENT
Start: 2023-07-10

## 2023-07-10 NOTE — PROGRESS NOTES
[facility]   Rebekah Garcia MD    Patient Name: Dayanara Monae  Patient YOB: 1945   Date of Service: 7/10/23    CC: Follow-up Psoriasis    HPI: Dayanara Monae is a 77 y.o. female here today for follow-up of psoriasis, last seen 01/2023.  Previous treatments include keto cream and soriatane.  Overall, the psoriasis is improved.  Treatment plan was followed as directed.    Past Medical History:   Diagnosis Date    Actinic keratoses     Atrial fibrillation     CVA (cerebral vascular accident)     Epilepsy     Non-melanoma skin cancer     Osteoporosis     Skin cancer      Past Surgical History:   Procedure Laterality Date    APPENDECTOMY      carpel tunnel Right     CHOLECYSTECTOMY      EYE SURGERY      HAND SURGERY      HYSTERECTOMY      JOINT REPLACEMENT       Review of patient's allergies indicates:   Allergen Reactions    Vaseline carbolated petroleum Blisters    Adhesive tape-silicones     Bactrim [sulfamethoxazole-trimethoprim]        Current Outpatient Medications:     albuterol (VENTOLIN HFA) 90 mcg/actuation inhaler, Inhale 2 puffs into the lungs every 6 (six) hours as needed for Wheezing. Or COUGH, Disp: 18 g, Rfl: 5    aspirin (ECOTRIN) 81 MG EC tablet, Take 81 mg by mouth once daily., Disp: , Rfl:     blood sugar diagnostic (BLOOD GLUCOSE TEST) Strp, 1 strip by Misc.(Non-Drug; Combo Route) route once daily., Disp: 100 strip, Rfl: 1    blood-glucose meter (BLOOD GLUCOSE MONITOR KIT MISC), 1 m by Misc.(Non-Drug; Combo Route) route., Disp: , Rfl:     blood-glucose meter kit, 1 each by Other route once daily., Disp: 1 each, Rfl: 0    calcium carb-mag hydrox-simeth 1,200 mg-270 mg -80 mg/10 mL Susp, 1 tablet., Disp: , Rfl:     calcium carbonate (OS-FATOU) 600 mg calcium (1,500 mg) Tab, Take 600 mg by mouth., Disp: , Rfl:     chlorhexidine (PERIDEX) 0.12 % solution, Use as directed 15 mLs in the mouth or throat 2 (two) times a day., Disp: , Rfl:     citalopram (CELEXA) 20 MG tablet, Take 1  tablet (20 mg total) by mouth once daily. For MOOD, Disp: 90 tablet, Rfl: 1    cyanocobalamin (VITAMIN B-12) 1000 MCG tablet, Take 1,000 mcg by mouth., Disp: , Rfl:     donepeziL (ARICEPT) 5 MG tablet, Take 1 tablet (5 mg total) by mouth once daily. For MEMORY, Disp: 90 tablet, Rfl: 1    DULoxetine (CYMBALTA) 30 MG capsule, TAKE 1 CAPSULE BY MOUTH ONCE DAILY FOR  JOINT  PAIN, Disp: 90 capsule, Rfl: 1    fluticasone propionate (FLONASE) 50 mcg/actuation nasal spray, 1 spray (50 mcg total) by Each Nostril route once daily., Disp: 16 g, Rfl: 5    hydroCHLOROthiazide (HYDRODIURIL) 25 MG tablet, Take 1 tablet (25 mg total) by mouth once daily. For BLOOD PRESSURE, Disp: 90 tablet, Rfl: 1    lancets 33 gauge Misc, 1 lancet by Misc.(Non-Drug; Combo Route) route once daily., Disp: 100 each, Rfl: 1    leucovorin (WELLCOVORIN) 25 MG Tab, Take 25 mg by mouth., Disp: , Rfl:     metFORMIN (GLUCOPHAGE-XR) 500 MG ER 24hr tablet, Take 1 tablet (500 mg total) by mouth 2 (two) times daily with meals. For diabetes, Disp: 180 tablet, Rfl: 1    metoprolol tartrate (LOPRESSOR) 25 MG tablet, Take 1 tablet (25 mg total) by mouth 2 (two) times daily. For HEART, Disp: 180 tablet, Rfl: 1    moxifloxacin (VIGAMOX) 0.5 % ophthalmic solution, , Disp: , Rfl:     omeprazole (PRILOSEC) 40 MG capsule, Take 1 capsule (40 mg total) by mouth once daily. For STOMACH, Disp: 90 capsule, Rfl: 1    potassium chloride (K-TAB) 20 mEq, Take 1 tablet (20 mEq total) by mouth once daily., Disp: 90 tablet, Rfl: 1    rosuvastatin (CRESTOR) 20 MG tablet, Take 1 tablet (20 mg total) by mouth every evening. For CHOLESTEROL, Disp: 90 tablet, Rfl: 1    travoprost (TRAVATAN Z) 0.004 % ophthalmic solution, Place 1 drop into both eyes every evening., Disp: , Rfl:     acitretin (SORIATANE) 25 MG capsule, Take one 25mg capsule PO daily for Psoriasis, Disp: 90 capsule, Rfl: 1    clobetasoL (TEMOVATE) 0.05 % cream, Apply to AA on body BID PRN flares tapering with improvement,  Disp: 60 g, Rfl: 6    ketoconazole (NIZORAL) 2 % cream, Apply topically once daily., Disp: 60 g, Rfl: 5    ROS: A focused review of systems was obtained and negative.     Exam: A focused skin exam was performed. All areas examined were normal except as mentioned in the assessment and plan below.  General Appearance of the patient is well developed and well nourished.  Orientation: alert and oriented x 3.  Mood and affect: pleasant.    Assessment:   The primary encounter diagnosis was Plaque psoriasis. Diagnoses of Onychomycosis, High risk medication use, and Plaque psoriasis were also pertinent to this visit.    Plan:   Medications Ordered This Encounter   Medications    acitretin (SORIATANE) 25 MG capsule     Sig: Take one 25mg capsule PO daily for Psoriasis     Dispense:  90 capsule     Refill:  1    clobetasoL (TEMOVATE) 0.05 % cream     Sig: Apply to AA on body BID PRN flares tapering with improvement     Dispense:  60 g     Refill:  6    ketoconazole (NIZORAL) 2 % cream     Sig: Apply topically once daily.     Dispense:  60 g     Refill:  5     Plaque Psoriasis  - psoriasiform plaques with micaceous scale  Status: Inadequately controlled     Plan: Counseling  I counseled the patient regarding the following:  Skin care: Emollients, ambient sun exposure, shampoos with tar, selenium or zinc pyrithione can improve psoriasis.  Expectations: Psoriasis is chronic in nature with periods of remissions and flares. Flares can be triggered by stress, infections (group A strep), certain medications and alcohol.  Contact office if: Psoriasis worsens, or fails to improve despite several months of treatment.     - Continue keto cream for tinea pedis prevention given onychomycosis   - Restart clobetasol cream  - Continue soriatane but will consider increasing dose if clobetasol does not control flares    High Risk Medication Monitoring (Z79.899) : The risks and benefits of the medication were reviewed in full with the patient.  Should any side effects occur, the patient will stop the medication and contact me immediately.    - will order CBC, CMP and lipid panel    Soriatane Counseling: I discussed with the patient the risks of acitretin including but not limited to hair loss, dry  lips/skin/eyes, liver damage, hyperlipidemia, depression/suicidal ideation, photosensitivity. Serious rare side  effects can include but are not limited to pancreatitis, pseudotumor cerebri, bony changes, clot  formation/stroke/heart attack. Patient understands that alcohol is contraindicated since it can result in liver toxicity  and significantly prolong the elimination of the drug by many years. Patient understands that this drug is not  meant for any woman of childbearing age or who plans to get pregnant since it is teratogenic and has a long drug  elimination half life.      Follow up in about 6 months (around 1/10/2024) for FU Psoriasis.    Rebekah Garcia MD

## 2023-07-19 ENCOUNTER — PATIENT MESSAGE (OUTPATIENT)
Dept: ADMINISTRATIVE | Facility: HOSPITAL | Age: 78
End: 2023-07-19

## 2023-07-25 ENCOUNTER — INFUSION (OUTPATIENT)
Dept: INFUSION THERAPY | Facility: HOSPITAL | Age: 78
End: 2023-07-25
Attending: FAMILY MEDICINE
Payer: MEDICARE

## 2023-07-25 VITALS
SYSTOLIC BLOOD PRESSURE: 131 MMHG | TEMPERATURE: 97 F | RESPIRATION RATE: 18 BRPM | WEIGHT: 173 LBS | BODY MASS INDEX: 34.94 KG/M2 | DIASTOLIC BLOOD PRESSURE: 63 MMHG | OXYGEN SATURATION: 96 % | HEART RATE: 67 BPM

## 2023-07-25 DIAGNOSIS — M81.0 AGE-RELATED OSTEOPOROSIS WITHOUT CURRENT PATHOLOGICAL FRACTURE: Primary | ICD-10-CM

## 2023-07-25 PROCEDURE — 96372 THER/PROPH/DIAG INJ SC/IM: CPT

## 2023-07-25 PROCEDURE — 63600175 PHARM REV CODE 636 W HCPCS: Mod: JZ,TB | Performed by: FAMILY MEDICINE

## 2023-07-25 RX ADMIN — DENOSUMAB 60 MG: 60 INJECTION SUBCUTANEOUS at 09:07

## 2023-07-25 NOTE — PROGRESS NOTES
0930 Patient in room 2. Calcium level 9.2    0949 Administered Prolia 60mg SQ to left upper arm per protocol.  Patient tolerated well. No adverse reaction noted.     1009 Patient discharged to home ambulatory with appointment in hand to return in 6 months for next prolia injection.

## 2023-08-03 DIAGNOSIS — E87.6 LOW BLOOD POTASSIUM: ICD-10-CM

## 2023-08-03 DIAGNOSIS — K21.9 GASTROESOPHAGEAL REFLUX DISEASE, UNSPECIFIED WHETHER ESOPHAGITIS PRESENT: Chronic | ICD-10-CM

## 2023-08-03 RX ORDER — POTASSIUM CHLORIDE 1500 MG/1
20 TABLET, EXTENDED RELEASE ORAL DAILY
Qty: 90 TABLET | Refills: 0 | Status: SHIPPED | OUTPATIENT
Start: 2023-08-03 | End: 2023-11-13 | Stop reason: SDUPTHER

## 2023-08-03 RX ORDER — OMEPRAZOLE 40 MG/1
40 CAPSULE, DELAYED RELEASE ORAL DAILY
Qty: 90 CAPSULE | Refills: 0 | Status: SHIPPED | OUTPATIENT
Start: 2023-08-03 | End: 2023-11-13 | Stop reason: SDUPTHER

## 2023-08-03 NOTE — TELEPHONE ENCOUNTER
Patient called stating she needs a refill on potassium, and omeprazole.   
[FreeTextEntry1] : scoliosis

## 2023-09-01 DIAGNOSIS — E78.2 MIXED HYPERLIPIDEMIA: Chronic | ICD-10-CM

## 2023-09-01 DIAGNOSIS — E11.9 TYPE 2 DIABETES MELLITUS WITHOUT COMPLICATION, WITHOUT LONG-TERM CURRENT USE OF INSULIN: Chronic | ICD-10-CM

## 2023-09-01 DIAGNOSIS — I10 ESSENTIAL HYPERTENSION: Chronic | ICD-10-CM

## 2023-09-01 DIAGNOSIS — F03.918 DEMENTIA WITH BEHAVIORAL DISTURBANCE: Chronic | ICD-10-CM

## 2023-09-01 RX ORDER — DONEPEZIL HYDROCHLORIDE 5 MG/1
5 TABLET, FILM COATED ORAL DAILY
Qty: 90 TABLET | Refills: 0 | Status: SHIPPED | OUTPATIENT
Start: 2023-09-01 | End: 2023-11-13 | Stop reason: SDUPTHER

## 2023-09-01 RX ORDER — HYDROCHLOROTHIAZIDE 25 MG/1
25 TABLET ORAL DAILY
Qty: 90 TABLET | Refills: 0 | Status: SHIPPED | OUTPATIENT
Start: 2023-09-01 | End: 2023-11-13 | Stop reason: SDUPTHER

## 2023-09-01 RX ORDER — ROSUVASTATIN CALCIUM 20 MG/1
20 TABLET, COATED ORAL NIGHTLY
Qty: 90 TABLET | Refills: 0 | Status: SHIPPED | OUTPATIENT
Start: 2023-09-01 | End: 2023-11-13 | Stop reason: SDUPTHER

## 2023-09-01 RX ORDER — METFORMIN HYDROCHLORIDE 500 MG/1
500 TABLET, EXTENDED RELEASE ORAL 2 TIMES DAILY WITH MEALS
Qty: 180 TABLET | Refills: 0 | Status: SHIPPED | OUTPATIENT
Start: 2023-09-01 | End: 2023-11-13 | Stop reason: SDUPTHER

## 2023-09-01 NOTE — TELEPHONE ENCOUNTER
Patient called stating she needs refills on HCTZ, Rosuvastatin, Metformin, Donepezil to go to [facility].

## 2023-09-25 ENCOUNTER — OFFICE VISIT (OUTPATIENT)
Dept: FAMILY MEDICINE | Facility: CLINIC | Age: 78
End: 2023-09-25
Payer: MEDICARE

## 2023-09-25 VITALS
TEMPERATURE: 98 F | RESPIRATION RATE: 20 BRPM | HEIGHT: 59 IN | HEART RATE: 62 BPM | OXYGEN SATURATION: 97 % | SYSTOLIC BLOOD PRESSURE: 138 MMHG | BODY MASS INDEX: 36.12 KG/M2 | DIASTOLIC BLOOD PRESSURE: 75 MMHG | WEIGHT: 179.19 LBS

## 2023-09-25 DIAGNOSIS — Z23 ENCOUNTER FOR ADMINISTRATION OF VACCINE: ICD-10-CM

## 2023-09-25 DIAGNOSIS — E11.65 TYPE 2 DIABETES MELLITUS WITH HYPERGLYCEMIA, WITHOUT LONG-TERM CURRENT USE OF INSULIN: Primary | Chronic | ICD-10-CM

## 2023-09-25 DIAGNOSIS — I10 ESSENTIAL HYPERTENSION: Chronic | ICD-10-CM

## 2023-09-25 LAB
EST. AVERAGE GLUCOSE BLD GHB EST-MCNC: 147 MG/DL
HBA1C MFR BLD HPLC: 7 % (ref 4.5–6.6)

## 2023-09-25 PROCEDURE — G0008 FLU VACCINE - QUADRIVALENT - ADJUVANTED: ICD-10-PCS | Mod: ,,, | Performed by: FAMILY MEDICINE

## 2023-09-25 PROCEDURE — 99214 OFFICE O/P EST MOD 30 MIN: CPT | Mod: ,,, | Performed by: FAMILY MEDICINE

## 2023-09-25 PROCEDURE — 90694 FLU VACCINE - QUADRIVALENT - ADJUVANTED: ICD-10-PCS | Mod: ,,, | Performed by: FAMILY MEDICINE

## 2023-09-25 PROCEDURE — 83036 HEMOGLOBIN A1C: ICD-10-PCS | Mod: ,,, | Performed by: CLINICAL MEDICAL LABORATORY

## 2023-09-25 PROCEDURE — G0008 ADMIN INFLUENZA VIRUS VAC: HCPCS | Mod: ,,, | Performed by: FAMILY MEDICINE

## 2023-09-25 PROCEDURE — 83036 HEMOGLOBIN GLYCOSYLATED A1C: CPT | Mod: ,,, | Performed by: CLINICAL MEDICAL LABORATORY

## 2023-09-25 PROCEDURE — 90694 VACC AIIV4 NO PRSRV 0.5ML IM: CPT | Mod: ,,, | Performed by: FAMILY MEDICINE

## 2023-09-25 PROCEDURE — 99214 PR OFFICE/OUTPT VISIT, EST, LEVL IV, 30-39 MIN: ICD-10-PCS | Mod: ,,, | Performed by: FAMILY MEDICINE

## 2023-09-25 NOTE — ASSESSMENT & PLAN NOTE
- Check glucose outside of clinic, record numbers, and bring log to follow up visit.    - Take medications as directed, and bring all medications to every clinic appointment.    - Eat a diabetic diet, if there are concerns about what that entails, we have a diabetic educator in our clinic.    - Cardiovascular exercise at least 3 times per week, for at least 15 minutes.    - Patient should be on a Statin medication, unless patient cannot tolerate a Statin.    - Aspirin should be taken everyday,  81mg, unless a higher dose is indicated for other medicaiton conditions. Also do not recommend Aspirin for a patient with known adverse effects from Aspirin.    - Recommend yearly, dilated eye exams for all Diabetic Patients.    - Monitor feet for calluses, abrasion, or other abnormalities. Report any concerns at every clinic visit.    -   Hemoglobin A1C   Date Value Ref Range Status   04/10/2023 7.2 (H) 4.5 - 6.6 % Final     Comment:       Normal:               <5.7%  Pre-Diabetic:       5.7% to 6.4%  Diabetic:             >6.4%  Diabetic Goal:     <7%   12/28/2022 8.3 (H) 4.5 - 6.6 % Final     Comment:       Normal:               <5.7%  Pre-Diabetic:       5.7% to 6.4%  Diabetic:             >6.4%  Diabetic Goal:     <7%   09/19/2022 6.9 (H) 4.5 - 6.6 % Final     Comment:       Normal:               <5.7%  Pre-Diabetic:       5.7% to 6.4%  Diabetic:             >6.4%  Diabetic Goal:     <7%
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
## 2023-09-28 DIAGNOSIS — M54.2 NECK PAIN: Chronic | ICD-10-CM

## 2023-10-02 RX ORDER — DULOXETIN HYDROCHLORIDE 30 MG/1
CAPSULE, DELAYED RELEASE ORAL
Qty: 90 CAPSULE | Refills: 1 | Status: SHIPPED | OUTPATIENT
Start: 2023-10-02 | End: 2024-02-01 | Stop reason: SDUPTHER

## 2023-11-13 DIAGNOSIS — K21.9 GASTROESOPHAGEAL REFLUX DISEASE, UNSPECIFIED WHETHER ESOPHAGITIS PRESENT: Chronic | ICD-10-CM

## 2023-11-13 DIAGNOSIS — E11.9 TYPE 2 DIABETES MELLITUS WITHOUT COMPLICATION, WITHOUT LONG-TERM CURRENT USE OF INSULIN: Chronic | ICD-10-CM

## 2023-11-13 DIAGNOSIS — I10 ESSENTIAL HYPERTENSION: Chronic | ICD-10-CM

## 2023-11-13 DIAGNOSIS — E78.2 MIXED HYPERLIPIDEMIA: Chronic | ICD-10-CM

## 2023-11-13 DIAGNOSIS — F03.918 DEMENTIA WITH BEHAVIORAL DISTURBANCE: Chronic | ICD-10-CM

## 2023-11-13 DIAGNOSIS — E87.6 LOW BLOOD POTASSIUM: ICD-10-CM

## 2023-11-14 RX ORDER — OMEPRAZOLE 40 MG/1
40 CAPSULE, DELAYED RELEASE ORAL DAILY
Qty: 90 CAPSULE | Refills: 0 | Status: SHIPPED | OUTPATIENT
Start: 2023-11-14 | End: 2024-02-01 | Stop reason: SDUPTHER

## 2023-11-14 RX ORDER — POTASSIUM CHLORIDE 1500 MG/1
20 TABLET, EXTENDED RELEASE ORAL DAILY
Qty: 90 TABLET | Refills: 0 | Status: SHIPPED | OUTPATIENT
Start: 2023-11-14 | End: 2024-02-01 | Stop reason: SDUPTHER

## 2023-11-14 RX ORDER — ROSUVASTATIN CALCIUM 20 MG/1
20 TABLET, COATED ORAL NIGHTLY
Qty: 90 TABLET | Refills: 0 | Status: SHIPPED | OUTPATIENT
Start: 2023-11-14 | End: 2024-02-01 | Stop reason: SDUPTHER

## 2023-11-14 RX ORDER — HYDROCHLOROTHIAZIDE 25 MG/1
25 TABLET ORAL DAILY
Qty: 90 TABLET | Refills: 0 | Status: SHIPPED | OUTPATIENT
Start: 2023-11-14 | End: 2024-02-01 | Stop reason: SDUPTHER

## 2023-11-14 RX ORDER — METFORMIN HYDROCHLORIDE 500 MG/1
500 TABLET, EXTENDED RELEASE ORAL 2 TIMES DAILY WITH MEALS
Qty: 180 TABLET | Refills: 0 | Status: SHIPPED | OUTPATIENT
Start: 2023-11-14 | End: 2024-02-01 | Stop reason: SDUPTHER

## 2023-11-14 RX ORDER — DONEPEZIL HYDROCHLORIDE 5 MG/1
5 TABLET, FILM COATED ORAL DAILY
Qty: 90 TABLET | Refills: 0 | Status: SHIPPED | OUTPATIENT
Start: 2023-11-14 | End: 2024-02-01 | Stop reason: SDUPTHER

## 2023-12-09 DIAGNOSIS — Z71.89 COMPLEX CARE COORDINATION: ICD-10-CM

## 2023-12-14 DIAGNOSIS — M81.0 OSTEOPOROSIS, UNSPECIFIED OSTEOPOROSIS TYPE, UNSPECIFIED PATHOLOGICAL FRACTURE PRESENCE: Primary | ICD-10-CM

## 2023-12-15 ENCOUNTER — HOSPITAL ENCOUNTER (OUTPATIENT)
Dept: RADIOLOGY | Facility: HOSPITAL | Age: 78
Discharge: HOME | End: 2023-12-15
Attending: NURSE PRACTITIONER
Payer: MEDICARE

## 2023-12-15 ENCOUNTER — OFFICE VISIT (OUTPATIENT)
Dept: FAMILY MEDICINE | Facility: CLINIC | Age: 78
End: 2023-12-15
Payer: MEDICARE

## 2023-12-15 VITALS
HEIGHT: 59 IN | DIASTOLIC BLOOD PRESSURE: 76 MMHG | OXYGEN SATURATION: 100 % | BODY MASS INDEX: 35.28 KG/M2 | SYSTOLIC BLOOD PRESSURE: 127 MMHG | TEMPERATURE: 98 F | RESPIRATION RATE: 18 BRPM | WEIGHT: 175 LBS | HEART RATE: 50 BPM

## 2023-12-15 DIAGNOSIS — R06.02 SHORTNESS OF BREATH: ICD-10-CM

## 2023-12-15 DIAGNOSIS — M62.838 MUSCLE SPASM: ICD-10-CM

## 2023-12-15 DIAGNOSIS — R50.9 FEVER, UNSPECIFIED FEVER CAUSE: ICD-10-CM

## 2023-12-15 DIAGNOSIS — J45.21 MILD INTERMITTENT ASTHMATIC BRONCHITIS WITH ACUTE EXACERBATION: Primary | ICD-10-CM

## 2023-12-15 DIAGNOSIS — N89.8 VAGINAL CYST: ICD-10-CM

## 2023-12-15 DIAGNOSIS — E66.01 CLASS 2 SEVERE OBESITY DUE TO EXCESS CALORIES WITH SERIOUS COMORBIDITY AND BODY MASS INDEX (BMI) OF 35.0 TO 35.9 IN ADULT: ICD-10-CM

## 2023-12-15 DIAGNOSIS — R05.1 ACUTE COUGH: ICD-10-CM

## 2023-12-15 DIAGNOSIS — I10 ESSENTIAL HYPERTENSION: Chronic | ICD-10-CM

## 2023-12-15 LAB
CTP QC/QA: YES
CTP QC/QA: YES
FLUAV AG NPH QL: NEGATIVE
FLUBV AG NPH QL: NEGATIVE
SARS-COV-2 AG RESP QL IA.RAPID: NEGATIVE

## 2023-12-15 PROCEDURE — 96372 PR INJECTION,THERAP/PROPH/DIAG2ST, IM OR SUBCUT: ICD-10-PCS | Mod: ,,, | Performed by: NURSE PRACTITIONER

## 2023-12-15 PROCEDURE — 96372 THER/PROPH/DIAG INJ SC/IM: CPT | Mod: ,,, | Performed by: NURSE PRACTITIONER

## 2023-12-15 PROCEDURE — 87804 INFLUENZA ASSAY W/OPTIC: CPT | Mod: 59,RHCUB | Performed by: NURSE PRACTITIONER

## 2023-12-15 PROCEDURE — 99214 OFFICE O/P EST MOD 30 MIN: CPT | Mod: ,,, | Performed by: NURSE PRACTITIONER

## 2023-12-15 PROCEDURE — 71046 X-RAY EXAM CHEST 2 VIEWS: CPT | Mod: TC,PN

## 2023-12-15 PROCEDURE — 99214 PR OFFICE/OUTPT VISIT, EST, LEVL IV, 30-39 MIN: ICD-10-PCS | Mod: ,,, | Performed by: NURSE PRACTITIONER

## 2023-12-15 PROCEDURE — 87426 SARSCOV CORONAVIRUS AG IA: CPT | Mod: RHCUB | Performed by: NURSE PRACTITIONER

## 2023-12-15 RX ORDER — PREDNISONE 20 MG/1
20 TABLET ORAL DAILY
Qty: 5 TABLET | Refills: 0 | Status: SHIPPED | OUTPATIENT
Start: 2023-12-15

## 2023-12-15 RX ORDER — DEXAMETHASONE SODIUM PHOSPHATE 4 MG/ML
6 INJECTION, SOLUTION INTRA-ARTICULAR; INTRALESIONAL; INTRAMUSCULAR; INTRAVENOUS; SOFT TISSUE
Status: COMPLETED | OUTPATIENT
Start: 2023-12-15 | End: 2023-12-15

## 2023-12-15 RX ORDER — CYCLOBENZAPRINE HCL 5 MG
5 TABLET ORAL 3 TIMES DAILY PRN
Qty: 20 TABLET | Refills: 0 | Status: SHIPPED | OUTPATIENT
Start: 2023-12-15 | End: 2023-12-25

## 2023-12-15 RX ORDER — METOPROLOL TARTRATE 25 MG/1
25 TABLET, FILM COATED ORAL 2 TIMES DAILY
Qty: 180 TABLET | Refills: 1 | Status: SHIPPED | OUTPATIENT
Start: 2023-12-15

## 2023-12-15 RX ADMIN — DEXAMETHASONE SODIUM PHOSPHATE 6 MG: 4 INJECTION, SOLUTION INTRA-ARTICULAR; INTRALESIONAL; INTRAMUSCULAR; INTRAVENOUS; SOFT TISSUE at 04:12

## 2023-12-15 NOTE — PROGRESS NOTES
"   Maria R Kimball DNP, FNP    [address] [place] [address]     PATIENT NAME: Dayanara Monae  : 1945  DATE: 12/15/23  MRN: [id number]      Billing Provider: Maria R Kimball DNP, FNP  Level of Service:   Patient PCP Information       Provider PCP Type    Salty Barahona MD General            Reason for Visit / Chief Complaint: Spasms (Complains of muscle spasms in her left side and bilateral legs. States it started this week.) and Pain (Complains of pain across her abdomen and in her head. States it started last week. She has been taking Tylenol and sinus medication with no relief.)       Update PCP  Update Chief Complaint         History of Present Illness / Problem Focused Workflow     Dayanara Monae presents to the clinic with Spasms (Complains of muscle spasms in her left side and bilateral legs. States it started this week.) and Pain (Complains of pain across her abdomen and in her head. States it started last week. She has been taking Tylenol and sinus medication with no relief.)   Also c/o "cyst" in perineal area  Spasms  Associated symptoms include myalgias. Pertinent negatives include no abdominal pain, arthralgias, change in bowel habit, chest pain, chills, congestion, coughing, fatigue, fever, headaches, nausea, rash, sore throat, vomiting or weakness.   Pain  Associated symptoms include myalgias. Pertinent negatives include no abdominal pain, arthralgias, change in bowel habit, chest pain, chills, congestion, coughing, fatigue, fever, headaches, nausea, rash, sore throat, vomiting or weakness.       Review of Systems     Review of Systems   Constitutional:  Negative for activity change, appetite change, chills, fatigue and fever.   HENT:  Negative for nasal congestion, ear pain, hearing loss, postnasal drip and sore throat.    Respiratory:  Negative for cough, chest tightness, shortness of breath and wheezing.    Cardiovascular:  Negative for chest " pain, palpitations, leg swelling and claudication.   Gastrointestinal:  Negative for abdominal pain, change in bowel habit, constipation, diarrhea, nausea and vomiting.   Genitourinary:  Positive for vaginal pain. Negative for dysuria.   Musculoskeletal:  Positive for myalgias. Negative for arthralgias, back pain and gait problem.   Integumentary:  Negative for rash.   Neurological:  Negative for weakness and headaches.   Psychiatric/Behavioral:  Negative for suicidal ideas. The patient is not nervous/anxious.         Medical / Social / Family History     Past Medical History:   Diagnosis Date    Actinic keratoses     Atrial fibrillation     CVA (cerebral vascular accident)     Epilepsy     Non-melanoma skin cancer     Osteoporosis     Skin cancer        Past Surgical History:   Procedure Laterality Date    APPENDECTOMY      carpel tunnel Right     CHOLECYSTECTOMY      EYE SURGERY      HAND SURGERY      HYSTERECTOMY      JOINT REPLACEMENT         Social History  Ms. Dayanara Monae  reports that she has never smoked. She has never been exposed to tobacco smoke. She has never used smokeless tobacco. She reports that she does not drink alcohol and does not use drugs.    Family History  Ms. Dayanara Monae's family history includes Diabetes in her sister; Hypertension in her mother; Stroke in her father.    Medications and Allergies     Medications  Outpatient Medications Marked as Taking for the 12/15/23 encounter (Office Visit) with Maria R Kimball, SHANON, FNP   Medication Sig Dispense Refill    acitretin (SORIATANE) 25 MG capsule Take one 25mg capsule PO daily for Psoriasis 90 capsule 1    albuterol (VENTOLIN HFA) 90 mcg/actuation inhaler Inhale 2 puffs into the lungs every 6 (six) hours as needed for Wheezing. Or COUGH 18 g 5    aspirin (ECOTRIN) 81 MG EC tablet Take 81 mg by mouth once daily.      blood sugar diagnostic (FREESTYLE LITE STRIPS) Strp 1 each by Misc.(Non-Drug; Combo Route) route 2 (two) times a day. 100  each 2    blood-glucose meter (BLOOD GLUCOSE MONITOR KIT MISC) 1 m by Misc.(Non-Drug; Combo Route) route.      calcium carb-mag hydrox-simeth 1,200 mg-270 mg -80 mg/10 mL Susp 1 tablet.      chlorhexidine (PERIDEX) 0.12 % solution Use as directed 15 mLs in the mouth or throat 2 (two) times a day.      citalopram (CELEXA) 20 MG tablet Take 1 tablet (20 mg total) by mouth once daily. For MOOD 90 tablet 1    clobetasoL (TEMOVATE) 0.05 % cream Apply to AA on body BID PRN flares tapering with improvement 60 g 6    cyanocobalamin (VITAMIN B-12) 1000 MCG tablet Take 1,000 mcg by mouth.      donepeziL (ARICEPT) 5 MG tablet Take 1 tablet (5 mg total) by mouth once daily. For MEMORY 90 tablet 0    DULoxetine (CYMBALTA) 30 MG capsule TAKE 1 CAPSULE BY MOUTH ONCE DAILY FOR  JOINT  PAIN 90 capsule 1    fluticasone propionate (FLONASE) 50 mcg/actuation nasal spray 1 spray (50 mcg total) by Each Nostril route once daily. 16 g 5    hydroCHLOROthiazide (HYDRODIURIL) 25 MG tablet Take 1 tablet (25 mg total) by mouth once daily. For BLOOD PRESSURE 90 tablet 0    ketoconazole (NIZORAL) 2 % cream Apply topically once daily. 60 g 5    lancets 33 gauge Misc 1 lancet by Misc.(Non-Drug; Combo Route) route once daily. 100 each 1    metFORMIN (GLUCOPHAGE-XR) 500 MG ER 24hr tablet Take 1 tablet (500 mg total) by mouth 2 (two) times daily with meals. For diabetes 180 tablet 0    omeprazole (PRILOSEC) 40 MG capsule Take 1 capsule (40 mg total) by mouth once daily. For STOMACH 90 capsule 0    potassium chloride (K-TAB) 20 mEq Take 1 tablet (20 mEq total) by mouth once daily. 90 tablet 0    rosuvastatin (CRESTOR) 20 MG tablet Take 1 tablet (20 mg total) by mouth every evening. For CHOLESTEROL 90 tablet 0    travoprost (TRAVATAN Z) 0.004 % ophthalmic solution Place 1 drop into both eyes every evening.      [DISCONTINUED] metoprolol tartrate (LOPRESSOR) 25 MG tablet Take 1 tablet (25 mg total) by mouth 2 (two) times daily. For HEART 180 tablet 1  "      Allergies  Review of patient's allergies indicates:   Allergen Reactions    Vaseline carbolated petroleum Blisters    Adhesive tape-silicones     Bactrim [sulfamethoxazole-trimethoprim]        Physical Examination     Vitals:    12/15/23 1438   BP: 127/76   BP Location: Right arm   Patient Position: Sitting   BP Method: Large (Automatic)   Pulse: (!) 50   Resp: 18   Temp: 97.5 °F (36.4 °C)   TempSrc: Oral   SpO2: 100%   Weight: 79.4 kg (175 lb)   Height: 4' 11" (1.499 m)     Physical Exam  Vitals and nursing note reviewed.   Constitutional:       General: She is not in acute distress.     Appearance: Normal appearance. She is not ill-appearing.   Eyes:      Extraocular Movements: Extraocular movements intact.      Pupils: Pupils are equal, round, and reactive to light.   Cardiovascular:      Rate and Rhythm: Regular rhythm. Bradycardia present.      Heart sounds: Normal heart sounds.   Pulmonary:      Effort: Pulmonary effort is normal.      Breath sounds: Normal breath sounds.   Abdominal:      General: Bowel sounds are normal.      Palpations: Abdomen is soft.   Musculoskeletal:         General: Normal range of motion.   Skin:     Findings: No rash.   Neurological:      General: No focal deficit present.      Mental Status: She is alert and oriented to person, place, and time. Mental status is at baseline.   Psychiatric:         Mood and Affect: Mood normal.         Behavior: Behavior normal.          Assessment and Plan (including Health Maintenance)      Problem List  Smart Sets  Document Outside HM   :    Plan:         Health Maintenance Due   Topic Date Due    Shingles Vaccine (1 of 2) Never done    RSV Vaccine (Age 60+ and Pregnant patients) (1 - 1-dose 60+ series) Never done    COVID-19 Vaccine (4 - 2023-24 season) 09/01/2023    Diabetes Urine Screening  09/19/2023    Hemoglobin A1c  12/25/2023       Problem List Items Addressed This Visit          Cardiac/Vascular    Essential hypertension (Chronic) "    Relevant Medications    metoprolol tartrate (LOPRESSOR) 25 MG tablet       Endocrine    Class 2 severe obesity due to excess calories with serious comorbidity and body mass index (BMI) of 35.0 to 35.9 in adult (Chronic)     Other Visit Diagnoses       Mild intermittent asthmatic bronchitis with acute exacerbation    -  Primary    Relevant Medications    dexAMETHasone injection 6 mg (Completed)    predniSONE (DELTASONE) 20 MG tablet    Shortness of breath        Relevant Orders    X-Ray Chest PA And Lateral (Completed)    Fever, unspecified fever cause        Relevant Orders    POCT Influenza A/B Rapid Antigen (Completed)    SARS Coronavirus 2 Antigen, POCT (Completed)    Acute cough        Relevant Orders    X-Ray Chest PA And Lateral (Completed)    POCT Influenza A/B Rapid Antigen (Completed)    SARS Coronavirus 2 Antigen, POCT (Completed)    Muscle spasm        Relevant Medications    cyclobenzaprine (FLEXERIL) 5 MG tablet    Vaginal cyst        Relevant Orders    Ambulatory referral/consult to Gynecology          Mild intermittent asthmatic bronchitis with acute exacerbation  -     dexAMETHasone injection 6 mg  -     predniSONE (DELTASONE) 20 MG tablet; Take 1 tablet (20 mg total) by mouth once daily.  Dispense: 5 tablet; Refill: 0    Essential hypertension  -     metoprolol tartrate (LOPRESSOR) 25 MG tablet; Take 1 tablet (25 mg total) by mouth 2 (two) times daily. For HEART  Dispense: 180 tablet; Refill: 1    Shortness of breath  -     X-Ray Chest PA And Lateral; Future; Expected date: 12/15/2023    Fever, unspecified fever cause  -     POCT Influenza A/B Rapid Antigen  -     SARS Coronavirus 2 Antigen, POCT    Acute cough  -     X-Ray Chest PA And Lateral; Future; Expected date: 12/15/2023  -     POCT Influenza A/B Rapid Antigen  -     SARS Coronavirus 2 Antigen, POCT    Muscle spasm  -     cyclobenzaprine (FLEXERIL) 5 MG tablet; Take 1 tablet (5 mg total) by mouth 3 (three) times daily as needed for Muscle  spasms.  Dispense: 20 tablet; Refill: 0    Vaginal cyst  -     Ambulatory referral/consult to Gynecology; Future; Expected date: 12/22/2023    Class 2 severe obesity due to excess calories with serious comorbidity and body mass index (BMI) of 35.0 to 35.9 in adult       Health Maintenance Topics with due status: Not Due       Topic Last Completion Date    TETANUS VACCINE 05/19/2015    DEXA Scan 04/29/2022    Eye Exam 06/19/2023    Lipid Panel 07/11/2023       Procedures     Future Appointments   Date Time Provider Department Center   12/22/2023  9:45 AM Salty Barahona MD [facility]   1/10/2024  9:45 AM Rebekah Garcia MD [facility]   1/30/2024  9:00 AM INFUSION, [facility] INFUSN [place] [facility]   2/9/2024  3:00 PM AWV NURSE, [facility] FAMILY [facility]ICINE [facility]        No follow-ups on file.     Signature:  Maria R Kimball DNP, FNP  [address] [place] [address]  Phone #: [phone]  Fax #: [phone]    Date of encounter: 12/15/23    Patient Instructions   Will refer to gyn for perineal cyst. Monitor BS due to steroids will increase BS levels. Follow up if symptoms persist. Use inhaler as needed.

## 2023-12-17 NOTE — PATIENT INSTRUCTIONS
Will refer to gyn for perineal cyst. Monitor BS due to steroids will increase BS levels. Follow up if symptoms persist. Use inhaler as needed.

## 2023-12-22 ENCOUNTER — TELEPHONE (OUTPATIENT)
Dept: FAMILY MEDICINE | Facility: CLINIC | Age: 78
End: 2023-12-22
Payer: MEDICARE

## 2023-12-22 ENCOUNTER — OFFICE VISIT (OUTPATIENT)
Dept: FAMILY MEDICINE | Facility: CLINIC | Age: 78
End: 2023-12-22
Payer: MEDICARE

## 2023-12-22 VITALS
RESPIRATION RATE: 16 BRPM | DIASTOLIC BLOOD PRESSURE: 75 MMHG | HEIGHT: 59 IN | BODY MASS INDEX: 36.69 KG/M2 | HEART RATE: 89 BPM | SYSTOLIC BLOOD PRESSURE: 126 MMHG | WEIGHT: 182 LBS | TEMPERATURE: 97 F | OXYGEN SATURATION: 98 %

## 2023-12-22 DIAGNOSIS — Z79.899 LONG TERM USE OF DRUG: ICD-10-CM

## 2023-12-22 DIAGNOSIS — E66.01 CLASS 2 SEVERE OBESITY DUE TO EXCESS CALORIES WITH SERIOUS COMORBIDITY AND BODY MASS INDEX (BMI) OF 36.0 TO 36.9 IN ADULT: Chronic | ICD-10-CM

## 2023-12-22 DIAGNOSIS — E78.2 MIXED HYPERLIPIDEMIA: Chronic | ICD-10-CM

## 2023-12-22 DIAGNOSIS — M81.0 AGE-RELATED OSTEOPOROSIS WITHOUT CURRENT PATHOLOGICAL FRACTURE: Chronic | ICD-10-CM

## 2023-12-22 DIAGNOSIS — M81.0 OSTEOPOROSIS, UNSPECIFIED OSTEOPOROSIS TYPE, UNSPECIFIED PATHOLOGICAL FRACTURE PRESENCE: ICD-10-CM

## 2023-12-22 DIAGNOSIS — G57.02 PIRIFORMIS SYNDROME OF LEFT SIDE: ICD-10-CM

## 2023-12-22 DIAGNOSIS — M16.12 PRIMARY OSTEOARTHRITIS OF LEFT HIP: Chronic | ICD-10-CM

## 2023-12-22 DIAGNOSIS — E11.65 TYPE 2 DIABETES MELLITUS WITH HYPERGLYCEMIA, WITHOUT LONG-TERM CURRENT USE OF INSULIN: Primary | Chronic | ICD-10-CM

## 2023-12-22 DIAGNOSIS — I10 ESSENTIAL HYPERTENSION: Chronic | ICD-10-CM

## 2023-12-22 DIAGNOSIS — M19.011 PRIMARY OSTEOARTHRITIS OF RIGHT SHOULDER: Chronic | ICD-10-CM

## 2023-12-22 DIAGNOSIS — R32 BOWEL AND BLADDER INCONTINENCE: Chronic | ICD-10-CM

## 2023-12-22 DIAGNOSIS — R15.9 BOWEL AND BLADDER INCONTINENCE: Chronic | ICD-10-CM

## 2023-12-22 PROBLEM — I70.0 AORTIC ATHEROSCLEROSIS: Chronic | Status: ACTIVE | Noted: 2023-02-03

## 2023-12-22 LAB
CALCIUM SERPL-MCNC: 10.1 MG/DL (ref 8.5–10.1)
EST. AVERAGE GLUCOSE BLD GHB EST-MCNC: 180 MG/DL
HBA1C MFR BLD HPLC: 7.9 % (ref 4.5–6.6)

## 2023-12-22 PROCEDURE — 83036 HEMOGLOBIN A1C: ICD-10-PCS | Mod: ,,, | Performed by: CLINICAL MEDICAL LABORATORY

## 2023-12-22 PROCEDURE — 99214 OFFICE O/P EST MOD 30 MIN: CPT | Mod: ,,, | Performed by: FAMILY MEDICINE

## 2023-12-22 PROCEDURE — 83036 HEMOGLOBIN GLYCOSYLATED A1C: CPT | Mod: ,,, | Performed by: CLINICAL MEDICAL LABORATORY

## 2023-12-22 PROCEDURE — 82310 CALCIUM: ICD-10-PCS | Mod: ,,, | Performed by: CLINICAL MEDICAL LABORATORY

## 2023-12-22 PROCEDURE — 99214 PR OFFICE/OUTPT VISIT, EST, LEVL IV, 30-39 MIN: ICD-10-PCS | Mod: ,,, | Performed by: FAMILY MEDICINE

## 2023-12-22 PROCEDURE — 82310 ASSAY OF CALCIUM: CPT | Mod: ,,, | Performed by: CLINICAL MEDICAL LABORATORY

## 2023-12-22 RX ORDER — MELOXICAM 15 MG/1
15 TABLET ORAL DAILY PRN
Qty: 30 TABLET | Refills: 0 | Status: SHIPPED | OUTPATIENT
Start: 2023-12-22 | End: 2024-02-01 | Stop reason: SDUPTHER

## 2023-12-22 RX ORDER — HYDROCODONE BITARTRATE AND ACETAMINOPHEN 7.5; 325 MG/1; MG/1
1 TABLET ORAL EVERY 8 HOURS PRN
Qty: 18 TABLET | Refills: 0 | Status: SHIPPED | OUTPATIENT
Start: 2023-12-22

## 2023-12-22 NOTE — PROGRESS NOTES
Salty Barahona MD    [address] [place] [address]     PATIENT NAME: Dayanara Monae  : 1945  DATE: 23  MRN: [id number]      Billing Provider: Salty Barahona MD  Level of Service: TX OFFICE/OUTPT VISIT, EST, LEVL IV, 30-39 MIN  Patient PCP Information       Provider PCP Type    Salty Barahona MD General            Reason for Visit / Chief Complaint: Diabetes, Hypertension (3 month follow up), Hip Pain (Left hip pain. Went to [facility] ER x rays were taken and she was told it was just arthritis. I will request those records. ), and Shoulder Pain (Right shoulder pain went to [facility] ER yesterday and was told it was arthritis. )       Update PCP  Update Chief Complaint         History of Present Illness / Problem Focused Workflow     Dayanara Monae presents to the clinic with Diabetes, Hypertension (3 month follow up), Hip Pain (Left hip pain. Went to [facility] ER x rays were taken and she was told it was just arthritis. I will request those records. ), and Shoulder Pain (Right shoulder pain went to [facility] ER yesterday and was told it was arthritis. )     She went to the ER yesterday for hip pain and shoulder pain     Reports bladder and bowel incontinence that has worsened over the past few months     She reports her memory is not good     HPI    Review of Systems     Review of Systems   Constitutional:  Negative for activity change, appetite change, chills, fatigue and fever.   HENT:  Negative for nasal congestion, ear pain, hearing loss, postnasal drip and sore throat.    Respiratory:  Negative for cough, chest tightness, shortness of breath and wheezing.    Cardiovascular:  Negative for chest pain, palpitations, leg swelling and claudication.   Gastrointestinal:  Negative for abdominal pain, change in bowel habit, constipation, diarrhea, nausea and vomiting.   Genitourinary:  Positive for bladder incontinence. Negative for dysuria.   Musculoskeletal:   Positive for arthralgias, gait problem and leg pain. Negative for back pain and myalgias.   Integumentary:  Negative for rash.   Neurological:  Negative for weakness and headaches.   Psychiatric/Behavioral:  Negative for suicidal ideas. The patient is not nervous/anxious.         Medical / Social / Family History     Past Medical History:   Diagnosis Date    Actinic keratoses     Atrial fibrillation     CVA (cerebral vascular accident)     Epilepsy     Non-melanoma skin cancer     Osteoporosis     Skin cancer        Past Surgical History:   Procedure Laterality Date    APPENDECTOMY      carpel tunnel Right     CHOLECYSTECTOMY      EYE SURGERY      HAND SURGERY      HYSTERECTOMY      JOINT REPLACEMENT         Social History  Ms.  reports that she has never smoked. She has never been exposed to tobacco smoke. She has never used smokeless tobacco. She reports that she does not drink alcohol and does not use drugs.    Family History  Ms.'s family history includes Diabetes in her sister; Hypertension in her mother; Stroke in her father.    Medications and Allergies     Medications  Outpatient Medications Marked as Taking for the 12/22/23 encounter (Office Visit) with Salty Barahona MD   Medication Sig Dispense Refill    acitretin (SORIATANE) 25 MG capsule Take one 25mg capsule PO daily for Psoriasis 90 capsule 1    albuterol (VENTOLIN HFA) 90 mcg/actuation inhaler Inhale 2 puffs into the lungs every 6 (six) hours as needed for Wheezing. Or COUGH 18 g 5    aspirin (ECOTRIN) 81 MG EC tablet Take 81 mg by mouth once daily.      blood sugar diagnostic (FREESTYLE LITE STRIPS) Strp 1 each by Misc.(Non-Drug; Combo Route) route 2 (two) times a day. 100 each 2    blood-glucose meter (BLOOD GLUCOSE MONITOR KIT MISC) 1 m by Misc.(Non-Drug; Combo Route) route.      calcium carb-mag hydrox-simeth 1,200 mg-270 mg -80 mg/10 mL Susp 1 tablet.      chlorhexidine (PERIDEX) 0.12 % solution Use as directed 15 mLs in the mouth or  throat 2 (two) times a day.      citalopram (CELEXA) 20 MG tablet Take 1 tablet (20 mg total) by mouth once daily. For MOOD 90 tablet 1    clobetasoL (TEMOVATE) 0.05 % cream Apply to AA on body BID PRN flares tapering with improvement 60 g 6    cyanocobalamin (VITAMIN B-12) 1000 MCG tablet Take 1,000 mcg by mouth.      cyclobenzaprine (FLEXERIL) 5 MG tablet Take 1 tablet (5 mg total) by mouth 3 (three) times daily as needed for Muscle spasms. 20 tablet 0    donepeziL (ARICEPT) 5 MG tablet Take 1 tablet (5 mg total) by mouth once daily. For MEMORY 90 tablet 0    DULoxetine (CYMBALTA) 30 MG capsule TAKE 1 CAPSULE BY MOUTH ONCE DAILY FOR  JOINT  PAIN 90 capsule 1    fluticasone propionate (FLONASE) 50 mcg/actuation nasal spray 1 spray (50 mcg total) by Each Nostril route once daily. 16 g 5    hydroCHLOROthiazide (HYDRODIURIL) 25 MG tablet Take 1 tablet (25 mg total) by mouth once daily. For BLOOD PRESSURE 90 tablet 0    ketoconazole (NIZORAL) 2 % cream Apply topically once daily. 60 g 5    lancets 33 gauge Misc 1 lancet by Misc.(Non-Drug; Combo Route) route once daily. 100 each 1    metFORMIN (GLUCOPHAGE-XR) 500 MG ER 24hr tablet Take 1 tablet (500 mg total) by mouth 2 (two) times daily with meals. For diabetes 180 tablet 0    metoprolol tartrate (LOPRESSOR) 25 MG tablet Take 1 tablet (25 mg total) by mouth 2 (two) times daily. For HEART 180 tablet 1    omeprazole (PRILOSEC) 40 MG capsule Take 1 capsule (40 mg total) by mouth once daily. For STOMACH 90 capsule 0    potassium chloride (K-TAB) 20 mEq Take 1 tablet (20 mEq total) by mouth once daily. 90 tablet 0    predniSONE (DELTASONE) 20 MG tablet Take 1 tablet (20 mg total) by mouth once daily. 5 tablet 0    rosuvastatin (CRESTOR) 20 MG tablet Take 1 tablet (20 mg total) by mouth every evening. For CHOLESTEROL 90 tablet 0       Allergies  Review of patient's allergies indicates:   Allergen Reactions    Vaseline carbolated petroleum Blisters    Adhesive tape-silicones      Bactrim [sulfamethoxazole-trimethoprim]        Physical Examination     Vitals:    12/22/23 0937   BP: 126/75   Pulse: 89   Resp: 16   Temp: 97.4 °F (36.3 °C)     Physical Exam  Vitals and nursing note reviewed.   Constitutional:       General: She is not in acute distress.     Appearance: Normal appearance. She is not ill-appearing.   Eyes:      Extraocular Movements: Extraocular movements intact.      Pupils: Pupils are equal, round, and reactive to light.   Cardiovascular:      Rate and Rhythm: Normal rate and regular rhythm.      Heart sounds: Normal heart sounds.   Pulmonary:      Effort: Pulmonary effort is normal.      Breath sounds: Normal breath sounds.   Abdominal:      General: Bowel sounds are normal.      Palpations: Abdomen is soft.   Musculoskeletal:         General: Normal range of motion.        Legs:       Comments: Limited ROM and pain with movement, left buttock pain present near the piriformis muscle    Skin:     Findings: No rash.   Neurological:      General: No focal deficit present.      Mental Status: She is alert and oriented to person, place, and time. Mental status is at baseline.   Psychiatric:         Mood and Affect: Mood normal.         Behavior: Behavior normal.            Assessment and Plan (including Health Maintenance)      Problem List  Smart Sets  Document Outside HM   :    Plan:     Piriformis syndrome - referred to home health with physical therapy, she has limited transportation at this time.     Labs for Diabetes     Health Maintenance Due   Topic Date Due    Shingles Vaccine (1 of 2) Never done    RSV Vaccine (Age 60+ and Pregnant patients) (1 - 1-dose 60+ series) Never done    COVID-19 Vaccine (4 - 2023-24 season) 09/01/2023    Diabetes Urine Screening  09/19/2023    Hemoglobin A1c  12/25/2023       Problem List Items Addressed This Visit          Cardiac/Vascular    Essential hypertension (Chronic)    Mixed hyperlipidemia (Chronic)       Renal/    Bowel and bladder  incontinence (Chronic)       Endocrine    Age-related osteoporosis without current pathological fracture (Chronic)    Type 2 diabetes mellitus with hyperglycemia, without long-term current use of insulin - Primary (Chronic)    Relevant Orders    Hemoglobin A1C    Microalbumin/Creatinine Ratio, Urine    Class 2 severe obesity due to excess calories with serious comorbidity and body mass index (BMI) of 36.0 to 36.9 in adult (Chronic)       Orthopedic    Primary osteoarthritis of left hip (Chronic)    Primary osteoarthritis of right shoulder (Chronic)     Other Visit Diagnoses       Piriformis syndrome of left side        Relevant Orders    Ambulatory referral/consult to Home Health    Long term use of drug        Relevant Orders    POCT Urine Drug Screen Presump            Health Maintenance Topics with due status: Not Due       Topic Last Completion Date    TETANUS VACCINE 05/19/2015    DEXA Scan 04/29/2022    Eye Exam 06/19/2023    Lipid Panel 07/11/2023       Procedures     Future Appointments   Date Time Provider Department Center   1/10/2024  9:45 AM Rebekah Garcia MD [facility] DER[facility] [place]   1/30/2024  9:00 AM INFUSION, [facility] INFUSN [place] [facility]   2/9/2024  3:00 PM AWV NURSE, [facility] FAMILY MEDICINE [facility]        Follow up in about 6 months (around 6/22/2024) for chronic health problems, hypertension, diabetes.     Signature:  Salty Barahona MD  [address] [place] [address]  Phone #: [phone]  Fax #: [phone]    Date of encounter: 12/22/23    There are no Patient Instructions on file for this visit.

## 2023-12-28 ENCOUNTER — TELEPHONE (OUTPATIENT)
Dept: FAMILY MEDICINE | Facility: CLINIC | Age: 78
End: 2023-12-28
Payer: MEDICARE

## 2023-12-28 NOTE — TELEPHONE ENCOUNTER
"Spoke with Pt about elevated Hgba1c 7.9%, averaging 180mg explained. Pt states eating homemade pound cake and cannot resist eating thru  the day. Pt has not tested BS due to lack of test strips, states transportation problems. Pt reports has to live a life and wants pound cake.   Pt reports PT from home health coming out to assist with pain in leg from back. I suggested Pt ask what exercises can be done in the home to keep moving. I urged drinking water and avoid making cakes that tempt all day eating.  Pt cannot come in to discuss meal planning due to no ride, I am not sure Pt will adhere to smaller meals, Weight 182lbs, 4'11" after talking awhile about importance of BS control.  Asked Pt to see if Home Health can look at monitor at home and  test strips to bring to home on another PT visit.  "

## 2023-12-28 NOTE — PROGRESS NOTES
Called Pt about Hgba1c elevated, Pt states eating homemade cake, just can't resist. Pt states not testing BS. We discussed for lengthy time about need to know what BS are to make changes in amount eaten thru day. Pt reports unable to get out of house due to lack of transportation~no testing supplies. Pt to have PT come in for exercises, I urged Pt to be as active as PT suggest.

## 2024-01-10 ENCOUNTER — OFFICE VISIT (OUTPATIENT)
Dept: DERMATOLOGY | Facility: CLINIC | Age: 79
End: 2024-01-10
Payer: MEDICARE

## 2024-01-10 DIAGNOSIS — L40.0 PLAQUE PSORIASIS: Chronic | ICD-10-CM

## 2024-01-10 DIAGNOSIS — L40.9 PSORIASIS: Primary | ICD-10-CM

## 2024-01-10 DIAGNOSIS — Z79.899 HIGH RISK MEDICATION USE: ICD-10-CM

## 2024-01-10 DIAGNOSIS — L08.9 SKIN INFECTION: ICD-10-CM

## 2024-01-10 DIAGNOSIS — B35.3 TINEA PEDIS OF BOTH FEET: ICD-10-CM

## 2024-01-10 PROCEDURE — 87186 SC STD MICRODIL/AGAR DIL: CPT | Mod: ,,, | Performed by: CLINICAL MEDICAL LABORATORY

## 2024-01-10 PROCEDURE — 87070 CULTURE OTHR SPECIMN AEROBIC: CPT | Mod: ,,, | Performed by: CLINICAL MEDICAL LABORATORY

## 2024-01-10 PROCEDURE — 87077 CULTURE AEROBIC IDENTIFY: CPT | Mod: ,,, | Performed by: CLINICAL MEDICAL LABORATORY

## 2024-01-10 PROCEDURE — 99214 OFFICE O/P EST MOD 30 MIN: CPT | Mod: ,,, | Performed by: DERMATOLOGY

## 2024-01-10 RX ORDER — ACITRETIN 25 MG/1
CAPSULE ORAL
Qty: 90 CAPSULE | Refills: 1 | Status: SHIPPED | OUTPATIENT
Start: 2024-01-10

## 2024-01-10 RX ORDER — MUPIROCIN 20 MG/G
OINTMENT TOPICAL
Qty: 30 G | Refills: 2 | Status: SHIPPED | OUTPATIENT
Start: 2024-01-10

## 2024-01-10 NOTE — PROGRESS NOTES
[facility]   Rebekah Garcia MD    Patient Name: Dayanara Monae  Patient YOB: 1945   Date of Service: 1/10/24    CC: Follow-up Psoriasis    HPI: Dayanara Monae is a 78 y.o. female here today for follow-up of psoriasis, last seen 07/23.  Previous treatments include clobetasol and acitretin.  Overall, the psoriasis is improved.  Treatment plan was followed as directed.    Past Medical History:   Diagnosis Date    Actinic keratoses     Atrial fibrillation     CVA (cerebral vascular accident)     Epilepsy     Non-melanoma skin cancer     Osteoporosis     Skin cancer      Past Surgical History:   Procedure Laterality Date    APPENDECTOMY      carpel tunnel Right     CHOLECYSTECTOMY      EYE SURGERY      HAND SURGERY      HYSTERECTOMY      JOINT REPLACEMENT       Review of patient's allergies indicates:   Allergen Reactions    Vaseline carbolated petroleum Blisters    Adhesive tape-silicones     Bactrim [sulfamethoxazole-trimethoprim]        Current Outpatient Medications:     acitretin (SORIATANE) 25 MG capsule, Take one 25mg capsule PO daily for Psoriasis, Disp: 90 capsule, Rfl: 1    albuterol (VENTOLIN HFA) 90 mcg/actuation inhaler, Inhale 2 puffs into the lungs every 6 (six) hours as needed for Wheezing. Or COUGH, Disp: 18 g, Rfl: 5    aspirin (ECOTRIN) 81 MG EC tablet, Take 81 mg by mouth once daily., Disp: , Rfl:     blood sugar diagnostic (FREESTYLE LITE STRIPS) Strp, 1 each by Misc.(Non-Drug; Combo Route) route 2 (two) times a day., Disp: 100 each, Rfl: 2    blood-glucose meter (BLOOD GLUCOSE MONITOR KIT MISC), 1 m by Misc.(Non-Drug; Combo Route) route., Disp: , Rfl:     calcium carb-mag hydrox-simeth 1,200 mg-270 mg -80 mg/10 mL Susp, 1 tablet., Disp: , Rfl:     chlorhexidine (PERIDEX) 0.12 % solution, Use as directed 15 mLs in the mouth or throat 2 (two) times a day., Disp: , Rfl:     citalopram (CELEXA) 20 MG tablet, Take 1 tablet (20 mg total) by mouth once daily. For MOOD, Disp: 90  tablet, Rfl: 1    clobetasoL (TEMOVATE) 0.05 % cream, Apply to AA on body BID PRN flares tapering with improvement, Disp: 60 g, Rfl: 6    cyanocobalamin (VITAMIN B-12) 1000 MCG tablet, Take 1,000 mcg by mouth., Disp: , Rfl:     donepeziL (ARICEPT) 5 MG tablet, Take 1 tablet (5 mg total) by mouth once daily. For MEMORY, Disp: 90 tablet, Rfl: 0    DULoxetine (CYMBALTA) 30 MG capsule, TAKE 1 CAPSULE BY MOUTH ONCE DAILY FOR  JOINT  PAIN, Disp: 90 capsule, Rfl: 1    fluticasone propionate (FLONASE) 50 mcg/actuation nasal spray, 1 spray (50 mcg total) by Each Nostril route once daily., Disp: 16 g, Rfl: 5    hydroCHLOROthiazide (HYDRODIURIL) 25 MG tablet, Take 1 tablet (25 mg total) by mouth once daily. For BLOOD PRESSURE, Disp: 90 tablet, Rfl: 0    HYDROcodone-acetaminophen (NORCO) 7.5-325 mg per tablet, Take 1 tablet by mouth every 8 (eight) hours as needed. SEVERE PAIN, Disp: 18 tablet, Rfl: 0    ketoconazole (NIZORAL) 2 % cream, Apply topically once daily., Disp: 60 g, Rfl: 5    lancets 33 gauge Misc, 1 lancet by Misc.(Non-Drug; Combo Route) route once daily., Disp: 100 each, Rfl: 1    meloxicam (MOBIC) 15 MG tablet, Take 1 tablet (15 mg total) by mouth daily as needed for Pain. And INFLAMMATION, Disp: 30 tablet, Rfl: 0    metFORMIN (GLUCOPHAGE-XR) 500 MG ER 24hr tablet, Take 1 tablet (500 mg total) by mouth 2 (two) times daily with meals. For diabetes, Disp: 180 tablet, Rfl: 0    metoprolol tartrate (LOPRESSOR) 25 MG tablet, Take 1 tablet (25 mg total) by mouth 2 (two) times daily. For HEART, Disp: 180 tablet, Rfl: 1    mupirocin (BACTROBAN) 2 % ointment, Apply to affected areas three times daily, Disp: 30 g, Rfl: 2    omeprazole (PRILOSEC) 40 MG capsule, Take 1 capsule (40 mg total) by mouth once daily. For STOMACH, Disp: 90 capsule, Rfl: 0    potassium chloride (K-TAB) 20 mEq, Take 1 tablet (20 mEq total) by mouth once daily., Disp: 90 tablet, Rfl: 0    predniSONE (DELTASONE) 20 MG tablet, Take 1 tablet (20 mg  total) by mouth once daily., Disp: 5 tablet, Rfl: 0    rosuvastatin (CRESTOR) 20 MG tablet, Take 1 tablet (20 mg total) by mouth every evening. For CHOLESTEROL, Disp: 90 tablet, Rfl: 0    travoprost (TRAVATAN Z) 0.004 % ophthalmic solution, Place 1 drop into both eyes every evening., Disp: , Rfl:     ROS: A focused review of systems was obtained and negative.     Exam: A focused skin exam was performed. All areas examined were normal except as mentioned in the assessment and plan below.  General Appearance of the patient is well developed and well nourished.  Orientation: alert and oriented x 3.  Mood and affect: pleasant.    Assessment:   The primary encounter diagnosis was Psoriasis. Diagnoses of Tinea pedis of both feet, High risk medication use, Skin infection, and Plaque psoriasis were also pertinent to this visit.    Plan:   Medications Ordered This Encounter   Medications    acitretin (SORIATANE) 25 MG capsule     Sig: Take one 25mg capsule PO daily for Psoriasis     Dispense:  90 capsule     Refill:  1    mupirocin (BACTROBAN) 2 % ointment     Sig: Apply to affected areas three times daily     Dispense:  30 g     Refill:  2       Plaque Psoriasis  - clear  Status: Improved      Plan: Counseling  I counseled the patient regarding the following:  Skin care: Emollients, ambient sun exposure, shampoos with tar, selenium or zinc pyrithione can improve psoriasis.  Expectations: Psoriasis is chronic in nature with periods of remissions and flares. Flares can be triggered by stress, infections (group A strep), certain medications and alcohol.  Contact office if: Psoriasis worsens, or fails to improve despite several months of treatment.    - continue soriatane and clobetasol PRN flares     Skin Infection, NOS   - crusted papules around lips    Plan: Counseling  I counseled the patient regarding the following:  Skin care: Patients with purulence or fluid collections should have their wounds re-opened, drained,  cultured and irrigated. All wound infections should be treated with antibiotics.  Expectations: Wound Infections usually occur 4-7 days postoperatively. Patients exhibit, pain, rednes, swelling, cellulitic changes and fever.  Contact Office if: Wound Infection fails to respond to treatment or worsens, patient develops a fever, or if redness spreads despite antibiotics.    A bacterial culture was obtained from the lip    - will start mupirocin    Tinea Pedis   - clear    Plan: Counseling  I counseled the patient regarding the following:  Skin care: Most forms of Tinea Pedis can be treated with topical antifungal creams.  Expectations: Tinea Pedis is a dermatophyte infection of the feet. Risk factors include pets, humid or warm climates. Cure rates are excellent, but recurrence is high.    High Risk Medication Monitoring (Z79.899) : The risks and benefits of the medication were reviewed in full with the patient. Should any side effects occur, the patient will stop the medication and contact me immediately.    - will order CBC, lipids and CMP for acitretin    Soriatane Counseling: I discussed with the patient the risks of acitretin including but not limited to hair loss, dry  lips/skin/eyes, liver damage, hyperlipidemia, depression/suicidal ideation, photosensitivity. Serious rare side  effects can include but are not limited to pancreatitis, pseudotumor cerebri, bony changes, clot  formation/stroke/heart attack. Patient understands that alcohol is contraindicated since it can result in liver toxicity  and significantly prolong the elimination of the drug by many years. Patient understands that this drug is not  meant for any woman of childbearing age or who plans to get pregnant since it is teratogenic and has a long drug  elimination half life.        Follow up in about 6 months (around 7/10/2024) for Psoriasis .    Rebekah Garcia MD

## 2024-01-11 LAB
ALBUMIN SERPL BCP-MCNC: 3.5 G/DL (ref 3.5–5)
ALBUMIN/GLOB SERPL: 0.9 {RATIO}
ALP SERPL-CCNC: 58 U/L (ref 55–142)
ALT SERPL W P-5'-P-CCNC: 27 U/L (ref 13–56)
ANION GAP SERPL CALCULATED.3IONS-SCNC: 12 MMOL/L (ref 7–16)
AST SERPL W P-5'-P-CCNC: 19 U/L (ref 15–37)
BASOPHILS # BLD AUTO: 0.06 K/UL (ref 0–0.2)
BASOPHILS NFR BLD AUTO: 0.7 % (ref 0–1)
BILIRUB SERPL-MCNC: 0.2 MG/DL (ref ?–1.2)
BUN SERPL-MCNC: 16 MG/DL (ref 7–18)
BUN/CREAT SERPL: 18 (ref 6–20)
CALCIUM SERPL-MCNC: 9.7 MG/DL (ref 8.5–10.1)
CHLORIDE SERPL-SCNC: 105 MMOL/L (ref 98–107)
CHOLEST SERPL-MCNC: 133 MG/DL (ref 0–200)
CHOLEST/HDLC SERPL: 3.1 {RATIO}
CO2 SERPL-SCNC: 28 MMOL/L (ref 21–32)
CREAT SERPL-MCNC: 0.88 MG/DL (ref 0.55–1.02)
DIFFERENTIAL METHOD BLD: ABNORMAL
EGFR (NO RACE VARIABLE) (RUSH/TITUS): 68 ML/MIN/1.73M2
EOSINOPHIL # BLD AUTO: 0.24 K/UL (ref 0–0.5)
EOSINOPHIL NFR BLD AUTO: 2.9 % (ref 1–4)
ERYTHROCYTE [DISTWIDTH] IN BLOOD BY AUTOMATED COUNT: 13.6 % (ref 11.5–14.5)
GLOBULIN SER-MCNC: 3.9 G/DL (ref 2–4)
GLUCOSE SERPL-MCNC: 70 MG/DL (ref 74–106)
HCT VFR BLD AUTO: 39.6 % (ref 38–47)
HDLC SERPL-MCNC: 43 MG/DL (ref 40–60)
HGB BLD-MCNC: 12.7 G/DL (ref 12–16)
IMM GRANULOCYTES # BLD AUTO: 0.06 K/UL (ref 0–0.04)
IMM GRANULOCYTES NFR BLD: 0.7 % (ref 0–0.4)
LDLC SERPL CALC-MCNC: 46 MG/DL
LDLC/HDLC SERPL: 1.1 {RATIO}
LYMPHOCYTES # BLD AUTO: 2.28 K/UL (ref 1–4.8)
LYMPHOCYTES NFR BLD AUTO: 27.5 % (ref 27–41)
MCH RBC QN AUTO: 27.4 PG (ref 27–31)
MCHC RBC AUTO-ENTMCNC: 32.1 G/DL (ref 32–36)
MCV RBC AUTO: 85.3 FL (ref 80–96)
MONOCYTES # BLD AUTO: 0.89 K/UL (ref 0–0.8)
MONOCYTES NFR BLD AUTO: 10.7 % (ref 2–6)
MPC BLD CALC-MCNC: 10.1 FL (ref 9.4–12.4)
NEUTROPHILS # BLD AUTO: 4.77 K/UL (ref 1.8–7.7)
NEUTROPHILS NFR BLD AUTO: 57.5 % (ref 53–65)
NONHDLC SERPL-MCNC: 90 MG/DL
NRBC # BLD AUTO: 0 X10E3/UL
NRBC, AUTO (.00): 0 %
PLATELET # BLD AUTO: 409 K/UL (ref 150–400)
POTASSIUM SERPL-SCNC: 4.7 MMOL/L (ref 3.5–5.1)
PROT SERPL-MCNC: 7.4 G/DL (ref 6.4–8.2)
RBC # BLD AUTO: 4.64 M/UL (ref 4.2–5.4)
SODIUM SERPL-SCNC: 140 MMOL/L (ref 136–145)
TRIGL SERPL-MCNC: 221 MG/DL (ref 35–150)
VLDLC SERPL-MCNC: 44 MG/DL
WBC # BLD AUTO: 8.3 K/UL (ref 4.5–11)

## 2024-01-11 PROCEDURE — 36415 COLL VENOUS BLD VENIPUNCTURE: CPT | Mod: ,,, | Performed by: CLINICAL MEDICAL LABORATORY

## 2024-01-11 PROCEDURE — 80053 COMPREHEN METABOLIC PANEL: CPT | Mod: ,,, | Performed by: CLINICAL MEDICAL LABORATORY

## 2024-01-11 PROCEDURE — 80061 LIPID PANEL: CPT | Mod: ,,, | Performed by: CLINICAL MEDICAL LABORATORY

## 2024-01-11 PROCEDURE — 85025 COMPLETE CBC W/AUTO DIFF WBC: CPT | Mod: ,,, | Performed by: CLINICAL MEDICAL LABORATORY

## 2024-01-12 ENCOUNTER — EXTERNAL HOME HEALTH (OUTPATIENT)
Dept: HOME HEALTH SERVICES | Facility: HOSPITAL | Age: 79
End: 2024-01-12
Payer: MEDICARE

## 2024-01-13 LAB — MICROORGANISM SPEC CULT: ABNORMAL

## 2024-01-30 ENCOUNTER — INFUSION (OUTPATIENT)
Dept: INFUSION THERAPY | Facility: HOSPITAL | Age: 79
End: 2024-01-30
Attending: FAMILY MEDICINE
Payer: MEDICARE

## 2024-01-30 VITALS
RESPIRATION RATE: 18 BRPM | HEIGHT: 59 IN | DIASTOLIC BLOOD PRESSURE: 72 MMHG | TEMPERATURE: 98 F | BODY MASS INDEX: 37.17 KG/M2 | OXYGEN SATURATION: 95 % | HEART RATE: 76 BPM | WEIGHT: 184.38 LBS | SYSTOLIC BLOOD PRESSURE: 138 MMHG

## 2024-01-30 DIAGNOSIS — M81.0 OSTEOPOROSIS, UNSPECIFIED OSTEOPOROSIS TYPE, UNSPECIFIED PATHOLOGICAL FRACTURE PRESENCE: ICD-10-CM

## 2024-01-30 DIAGNOSIS — M81.0 AGE-RELATED OSTEOPOROSIS WITHOUT CURRENT PATHOLOGICAL FRACTURE: Primary | ICD-10-CM

## 2024-01-30 PROCEDURE — 96372 THER/PROPH/DIAG INJ SC/IM: CPT

## 2024-01-30 PROCEDURE — 63600175 PHARM REV CODE 636 W HCPCS: Mod: JZ,TB | Performed by: FAMILY MEDICINE

## 2024-01-30 RX ADMIN — DENOSUMAB 60 MG: 60 INJECTION SUBCUTANEOUS at 08:01

## 2024-01-30 NOTE — PROGRESS NOTES
0835 Pt in room 1.  Calcium level checked within last 30 days was 9.7.  Pt stated that she is taking her Calcium with Vitamin D.     0842 Administered Prolia 60mg SQ to right upper arm per protocol.  Pt tolerated well.    0902 No adverse reaction noted.  D/C home, ambulatory with appointment in hand to return in 6 months for next Prolia injection.

## 2024-02-01 DIAGNOSIS — E11.9 TYPE 2 DIABETES MELLITUS WITHOUT COMPLICATION, WITHOUT LONG-TERM CURRENT USE OF INSULIN: Chronic | ICD-10-CM

## 2024-02-01 DIAGNOSIS — F03.918 DEMENTIA WITH BEHAVIORAL DISTURBANCE: Chronic | ICD-10-CM

## 2024-02-01 DIAGNOSIS — E78.2 MIXED HYPERLIPIDEMIA: Chronic | ICD-10-CM

## 2024-02-01 DIAGNOSIS — I10 ESSENTIAL HYPERTENSION: Chronic | ICD-10-CM

## 2024-02-01 DIAGNOSIS — K21.9 GASTROESOPHAGEAL REFLUX DISEASE, UNSPECIFIED WHETHER ESOPHAGITIS PRESENT: Chronic | ICD-10-CM

## 2024-02-01 DIAGNOSIS — E87.6 LOW BLOOD POTASSIUM: ICD-10-CM

## 2024-02-01 DIAGNOSIS — M54.2 NECK PAIN: Chronic | ICD-10-CM

## 2024-02-01 RX ORDER — HYDROCODONE BITARTRATE AND ACETAMINOPHEN 7.5; 325 MG/1; MG/1
1 TABLET ORAL EVERY 8 HOURS PRN
Qty: 18 TABLET | Refills: 0 | Status: CANCELLED | OUTPATIENT
Start: 2024-02-01

## 2024-02-01 RX ORDER — HYDROCHLOROTHIAZIDE 25 MG/1
25 TABLET ORAL DAILY
Qty: 90 TABLET | Refills: 0 | Status: SHIPPED | OUTPATIENT
Start: 2024-02-01 | End: 2024-04-19 | Stop reason: SDUPTHER

## 2024-02-01 RX ORDER — METFORMIN HYDROCHLORIDE 500 MG/1
500 TABLET, EXTENDED RELEASE ORAL 2 TIMES DAILY WITH MEALS
Qty: 180 TABLET | Refills: 0 | Status: SHIPPED | OUTPATIENT
Start: 2024-02-01 | End: 2024-06-19 | Stop reason: SDUPTHER

## 2024-02-01 RX ORDER — OMEPRAZOLE 40 MG/1
40 CAPSULE, DELAYED RELEASE ORAL DAILY
Qty: 90 CAPSULE | Refills: 0 | Status: SHIPPED | OUTPATIENT
Start: 2024-02-01 | End: 2024-05-22 | Stop reason: SDUPTHER

## 2024-02-01 RX ORDER — POTASSIUM CHLORIDE 1500 MG/1
20 TABLET, EXTENDED RELEASE ORAL DAILY
Qty: 90 TABLET | Refills: 0 | Status: SHIPPED | OUTPATIENT
Start: 2024-02-01 | End: 2024-05-10 | Stop reason: SDUPTHER

## 2024-02-01 RX ORDER — DONEPEZIL HYDROCHLORIDE 5 MG/1
5 TABLET, FILM COATED ORAL DAILY
Qty: 90 TABLET | Refills: 0 | Status: SHIPPED | OUTPATIENT
Start: 2024-02-01

## 2024-02-01 RX ORDER — DULOXETIN HYDROCHLORIDE 30 MG/1
CAPSULE, DELAYED RELEASE ORAL
Qty: 90 CAPSULE | Refills: 0 | Status: SHIPPED | OUTPATIENT
Start: 2024-02-01 | End: 2024-04-19 | Stop reason: SDUPTHER

## 2024-02-01 RX ORDER — MELOXICAM 15 MG/1
15 TABLET ORAL DAILY PRN
Qty: 90 TABLET | Refills: 0 | Status: SHIPPED | OUTPATIENT
Start: 2024-02-01 | End: 2024-04-19 | Stop reason: SDUPTHER

## 2024-02-01 RX ORDER — ROSUVASTATIN CALCIUM 20 MG/1
20 TABLET, COATED ORAL NIGHTLY
Qty: 90 TABLET | Refills: 0 | Status: SHIPPED | OUTPATIENT
Start: 2024-02-01 | End: 2024-04-19 | Stop reason: SDUPTHER

## 2024-02-07 ENCOUNTER — DOCUMENT SCAN (OUTPATIENT)
Dept: HOME HEALTH SERVICES | Facility: HOSPITAL | Age: 79
End: 2024-02-07
Payer: MEDICARE

## 2024-02-22 ENCOUNTER — TELEPHONE (OUTPATIENT)
Dept: FAMILY MEDICINE | Facility: CLINIC | Age: 79
End: 2024-02-22
Payer: MEDICARE

## 2024-02-22 DIAGNOSIS — M81.0 OSTEOPOROSIS, UNSPECIFIED OSTEOPOROSIS TYPE, UNSPECIFIED PATHOLOGICAL FRACTURE PRESENCE: Primary | ICD-10-CM

## 2024-02-22 NOTE — TELEPHONE ENCOUNTER
----- Message from Lalita Alvarez RN sent at 2/22/2024 10:22 AM CST -----  Regarding: Bone Density  Pt will need a new Bone Density last done 04/29/22.  Thanks.

## 2024-04-19 DIAGNOSIS — M54.2 NECK PAIN: Chronic | ICD-10-CM

## 2024-04-19 DIAGNOSIS — E78.2 MIXED HYPERLIPIDEMIA: Chronic | ICD-10-CM

## 2024-04-19 DIAGNOSIS — I10 ESSENTIAL HYPERTENSION: Chronic | ICD-10-CM

## 2024-04-19 RX ORDER — MELOXICAM 15 MG/1
15 TABLET ORAL DAILY PRN
Qty: 90 TABLET | Refills: 0 | Status: SHIPPED | OUTPATIENT
Start: 2024-04-19

## 2024-04-19 RX ORDER — HYDROCHLOROTHIAZIDE 25 MG/1
25 TABLET ORAL DAILY
Qty: 90 TABLET | Refills: 0 | Status: SHIPPED | OUTPATIENT
Start: 2024-04-19

## 2024-04-19 RX ORDER — DULOXETIN HYDROCHLORIDE 30 MG/1
CAPSULE, DELAYED RELEASE ORAL
Qty: 90 CAPSULE | Refills: 0 | Status: SHIPPED | OUTPATIENT
Start: 2024-04-19

## 2024-04-19 RX ORDER — ROSUVASTATIN CALCIUM 20 MG/1
20 TABLET, COATED ORAL NIGHTLY
Qty: 90 TABLET | Refills: 0 | Status: SHIPPED | OUTPATIENT
Start: 2024-04-19

## 2024-05-02 ENCOUNTER — OFFICE VISIT (OUTPATIENT)
Dept: FAMILY MEDICINE | Facility: CLINIC | Age: 79
End: 2024-05-02
Payer: MEDICARE

## 2024-05-02 ENCOUNTER — HOSPITAL ENCOUNTER (OUTPATIENT)
Dept: RADIOLOGY | Facility: HOSPITAL | Age: 79
Discharge: HOME | End: 2024-05-02
Attending: FAMILY MEDICINE
Payer: MEDICARE

## 2024-05-02 VITALS
SYSTOLIC BLOOD PRESSURE: 134 MMHG | DIASTOLIC BLOOD PRESSURE: 70 MMHG | HEART RATE: 68 BPM | RESPIRATION RATE: 16 BRPM | TEMPERATURE: 97 F | OXYGEN SATURATION: 97 % | WEIGHT: 178 LBS | HEIGHT: 59 IN | BODY MASS INDEX: 35.88 KG/M2

## 2024-05-02 DIAGNOSIS — M25.552 BILATERAL HIP PAIN: ICD-10-CM

## 2024-05-02 DIAGNOSIS — E11.65 TYPE 2 DIABETES MELLITUS WITH HYPERGLYCEMIA, WITHOUT LONG-TERM CURRENT USE OF INSULIN: Primary | Chronic | ICD-10-CM

## 2024-05-02 DIAGNOSIS — G56.03 BILATERAL CARPAL TUNNEL SYNDROME: Chronic | ICD-10-CM

## 2024-05-02 DIAGNOSIS — M16.0 PRIMARY OSTEOARTHRITIS OF BOTH HIPS: Chronic | ICD-10-CM

## 2024-05-02 DIAGNOSIS — M54.2 NECK PAIN: Chronic | ICD-10-CM

## 2024-05-02 DIAGNOSIS — I70.0 AORTIC ATHEROSCLEROSIS: Chronic | ICD-10-CM

## 2024-05-02 DIAGNOSIS — E66.01 CLASS 2 SEVERE OBESITY DUE TO EXCESS CALORIES WITH SERIOUS COMORBIDITY AND BODY MASS INDEX (BMI) OF 36.0 TO 36.9 IN ADULT: Chronic | ICD-10-CM

## 2024-05-02 DIAGNOSIS — F03.918 DEMENTIA WITH BEHAVIORAL DISTURBANCE: Chronic | ICD-10-CM

## 2024-05-02 DIAGNOSIS — F33.41 RECURRENT MAJOR DEPRESSIVE DISORDER, IN PARTIAL REMISSION: Chronic | ICD-10-CM

## 2024-05-02 DIAGNOSIS — I10 ESSENTIAL HYPERTENSION: Chronic | ICD-10-CM

## 2024-05-02 DIAGNOSIS — M25.551 BILATERAL HIP PAIN: ICD-10-CM

## 2024-05-02 DIAGNOSIS — L40.0 PLAQUE PSORIASIS: Chronic | ICD-10-CM

## 2024-05-02 PROBLEM — R21 RASH AND NONSPECIFIC SKIN ERUPTION: Status: ACTIVE | Noted: 2021-06-01

## 2024-05-02 LAB
EST. AVERAGE GLUCOSE BLD GHB EST-MCNC: 177 MG/DL
HBA1C MFR BLD HPLC: 7.8 % (ref 4.5–6.6)

## 2024-05-02 PROCEDURE — 99214 OFFICE O/P EST MOD 30 MIN: CPT | Mod: ,,, | Performed by: FAMILY MEDICINE

## 2024-05-02 PROCEDURE — 73522 X-RAY EXAM HIPS BI 3-4 VIEWS: CPT | Mod: TC,PN

## 2024-05-02 PROCEDURE — 83036 HEMOGLOBIN GLYCOSYLATED A1C: CPT | Mod: ,,, | Performed by: CLINICAL MEDICAL LABORATORY

## 2024-05-02 PROCEDURE — 96372 THER/PROPH/DIAG INJ SC/IM: CPT | Mod: ,,, | Performed by: FAMILY MEDICINE

## 2024-05-02 RX ORDER — NAPROXEN SODIUM 550 MG/1
550 TABLET ORAL 2 TIMES DAILY WITH MEALS
Qty: 180 TABLET | Refills: 1 | Status: SHIPPED | OUTPATIENT
Start: 2024-05-02

## 2024-05-02 RX ORDER — NAPROXEN SODIUM 550 MG/1
550 TABLET ORAL 2 TIMES DAILY WITH MEALS
COMMUNITY
End: 2024-05-02 | Stop reason: SDUPTHER

## 2024-05-02 RX ORDER — METHYLPREDNISOLONE ACETATE 40 MG/ML
40 INJECTION, SUSPENSION INTRA-ARTICULAR; INTRALESIONAL; INTRAMUSCULAR; SOFT TISSUE
Status: COMPLETED | OUTPATIENT
Start: 2024-05-02 | End: 2024-05-02

## 2024-05-02 RX ORDER — DEXAMETHASONE SODIUM PHOSPHATE 4 MG/ML
4 INJECTION, SOLUTION INTRA-ARTICULAR; INTRALESIONAL; INTRAMUSCULAR; INTRAVENOUS; SOFT TISSUE
Status: COMPLETED | OUTPATIENT
Start: 2024-05-02 | End: 2024-05-02

## 2024-05-02 RX ADMIN — DEXAMETHASONE SODIUM PHOSPHATE 4 MG: 4 INJECTION, SOLUTION INTRA-ARTICULAR; INTRALESIONAL; INTRAMUSCULAR; INTRAVENOUS; SOFT TISSUE at 12:05

## 2024-05-02 RX ADMIN — METHYLPREDNISOLONE ACETATE 40 MG: 40 INJECTION, SUSPENSION INTRA-ARTICULAR; INTRALESIONAL; INTRAMUSCULAR; SOFT TISSUE at 12:05

## 2024-05-02 NOTE — PROGRESS NOTES
Salty Barahona MD    [address] [place] [address]     PATIENT NAME: Dayanara Monae  : 1945  DATE: 24  MRN: [id number]      Billing Provider: Salty Barahona MD  Level of Service: IN OFFICE/OUTPT VISIT, EST, LEVL IV, 30-39 MIN  Patient PCP Information       Provider PCP Type    Salty Barahona MD General            Reason for Visit / Chief Complaint: Rash (On her arms and stomach area. Itching, red rash)       Update PCP  Update Chief Complaint         History of Present Illness / Problem Focused Workflow     Dayanara Monae presents to the clinic with Rash (On her arms and stomach area. Itching, red rash)     She has a history of similar rash, has been seen by Dermatology ain the past and was Dx with Plaque Psoriasis, treatment given did help some but she states the steroid shot she gets in clinic helps the most     Has multiple joint pain as well, hips seem to be stiff and cause her some pain     HPI    Review of Systems     Review of Systems   Constitutional:  Negative for activity change, appetite change, chills, fatigue and fever.   HENT:  Negative for nasal congestion, ear pain, hearing loss, postnasal drip and sore throat.    Respiratory:  Negative for cough, chest tightness, shortness of breath and wheezing.    Cardiovascular:  Negative for chest pain, palpitations, leg swelling and claudication.   Gastrointestinal:  Negative for abdominal pain, change in bowel habit, constipation, diarrhea, nausea and vomiting.   Genitourinary:  Negative for dysuria.   Musculoskeletal:  Positive for arthralgias, gait problem, leg pain and myalgias. Negative for back pain.   Integumentary:  Positive for rash.   Neurological:  Negative for weakness and headaches.   Psychiatric/Behavioral:  Negative for suicidal ideas. The patient is not nervous/anxious.         Medical / Social / Family History     Past Medical History:   Diagnosis Date    Actinic  keratoses     Atrial fibrillation     CVA (cerebral vascular accident)     Epilepsy     Non-melanoma skin cancer     Osteoporosis     Skin cancer        Past Surgical History:   Procedure Laterality Date    APPENDECTOMY      carpel tunnel Right     CHOLECYSTECTOMY      EYE SURGERY      HAND SURGERY      HYSTERECTOMY      JOINT REPLACEMENT         Social History  Ms.  reports that she has never smoked. She has never been exposed to tobacco smoke. She has never used smokeless tobacco. She reports that she does not drink alcohol and does not use drugs.    Family History  Ms.'s family history includes Diabetes in her sister; Hypertension in her mother; Stroke in her father.    Medications and Allergies     Medications  Current Outpatient Medications   Medication Sig Dispense Refill    acitretin (SORIATANE) 25 MG capsule Take one 25mg capsule PO daily for Psoriasis 90 capsule 1    albuterol (VENTOLIN HFA) 90 mcg/actuation inhaler Inhale 2 puffs into the lungs every 6 (six) hours as needed for Wheezing. Or COUGH 18 g 5    aspirin (ECOTRIN) 81 MG EC tablet Take 81 mg by mouth once daily.      blood sugar diagnostic (FREESTYLE LITE STRIPS) Strp 1 each by Misc.(Non-Drug; Combo Route) route 2 (two) times a day. 100 each 2    blood-glucose meter (BLOOD GLUCOSE MONITOR KIT MISC) 1 m by Misc.(Non-Drug; Combo Route) route.      calcium carb-mag hydrox-simeth 1,200 mg-270 mg -80 mg/10 mL Susp 1 tablet.      chlorhexidine (PERIDEX) 0.12 % solution Use as directed 15 mLs in the mouth or throat 2 (two) times a day.      citalopram (CELEXA) 20 MG tablet Take 1 tablet (20 mg total) by mouth once daily. For MOOD 90 tablet 1    clobetasoL (TEMOVATE) 0.05 % cream Apply to AA on body BID PRN flares tapering with improvement 60 g 6    cyanocobalamin (VITAMIN B-12) 1000 MCG tablet Take 1,000 mcg by mouth.      donepeziL (ARICEPT) 5 MG tablet Take 1 tablet (5 mg total) by mouth once daily. For MEMORY 90 tablet 0    DULoxetine (CYMBALTA) 30 MG  capsule TAKE 1 CAPSULE BY MOUTH ONCE DAILY FOR  JOINT  PAIN 90 capsule 0    fluticasone propionate (FLONASE) 50 mcg/actuation nasal spray 1 spray (50 mcg total) by Each Nostril route once daily. 16 g 5    hydroCHLOROthiazide (HYDRODIURIL) 25 MG tablet Take 1 tablet (25 mg total) by mouth once daily. For BLOOD PRESSURE 90 tablet 0    HYDROcodone-acetaminophen (NORCO) 7.5-325 mg per tablet Take 1 tablet by mouth every 8 (eight) hours as needed. SEVERE PAIN 18 tablet 0    ketoconazole (NIZORAL) 2 % cream Apply topically once daily. 60 g 5    lancets 33 gauge Misc 1 lancet by Misc.(Non-Drug; Combo Route) route once daily. 100 each 1    meloxicam (MOBIC) 15 MG tablet Take 1 tablet (15 mg total) by mouth daily as needed for Pain. And INFLAMMATION 90 tablet 0    metFORMIN (GLUCOPHAGE-XR) 500 MG ER 24hr tablet Take 1 tablet (500 mg total) by mouth 2 (two) times daily with meals. For diabetes 180 tablet 0    metoprolol tartrate (LOPRESSOR) 25 MG tablet Take 1 tablet (25 mg total) by mouth 2 (two) times daily. For HEART 180 tablet 1    mupirocin (BACTROBAN) 2 % ointment Apply to affected areas three times daily 30 g 2    omeprazole (PRILOSEC) 40 MG capsule Take 1 capsule (40 mg total) by mouth once daily. For STOMACH 90 capsule 0    potassium chloride (K-TAB) 20 mEq Take 1 tablet (20 mEq total) by mouth once daily. 90 tablet 0    rosuvastatin (CRESTOR) 20 MG tablet Take 1 tablet (20 mg total) by mouth every evening. For CHOLESTEROL 90 tablet 0    travoprost (TRAVATAN Z) 0.004 % ophthalmic solution Place 1 drop into both eyes every evening.      naproxen sodium (ANAPROX) 550 MG tablet Take 1 tablet (550 mg total) by mouth 2 (two) times daily with meals. For PAIN and INFLAMMATION 180 tablet 1     No current facility-administered medications for this visit.       Allergies  Review of patient's allergies indicates:   Allergen Reactions    Vaseline carbolated petroleum Blisters    Adhesive tape-silicones     Bactrim  [sulfamethoxazole-trimethoprim]        Physical Examination     Vitals:    05/02/24 1228   BP: 134/70   Pulse: 68   Resp: 16   Temp: 97.2 °F (36.2 °C)     Physical Exam  Vitals and nursing note reviewed.   Constitutional:       General: She is not in acute distress.     Appearance: Normal appearance. She is not ill-appearing.   Eyes:      Extraocular Movements: Extraocular movements intact.      Pupils: Pupils are equal, round, and reactive to light.   Cardiovascular:      Rate and Rhythm: Normal rate and regular rhythm.      Heart sounds: Normal heart sounds.   Pulmonary:      Effort: Pulmonary effort is normal.      Breath sounds: Normal breath sounds.   Abdominal:      General: Bowel sounds are normal.      Palpations: Abdomen is soft.   Musculoskeletal:      Right hip: Decreased range of motion.      Left hip: Decreased range of motion.   Skin:     Findings: No rash.   Neurological:      General: No focal deficit present.      Mental Status: She is alert and oriented to person, place, and time. Mental status is at baseline.   Psychiatric:         Mood and Affect: Mood normal.         Behavior: Behavior normal.            Assessment and Plan (including Health Maintenance)      Problem List  Smart Sets  Document Outside HM   :    Plan:     Mild osteoarthritis both hips, stay active with exercise and routine stretching     I'm steroids for inflammatory skin rash, presumed psoriatic. Monitor glucoses due to steroid injection    Health Maintenance Due   Topic Date Due    Shingles Vaccine (1 of 2) Never done    RSV Vaccine (Age 60+ and Pregnant patients) (1 - 1-dose 60+ series) Never done    COVID-19 Vaccine (4 - 2023-24 season) 09/01/2023    Diabetes Urine Screening  09/19/2023    Hemoglobin A1c  03/22/2024    Eye Exam  06/19/2024       Problem List Items Addressed This Visit          Neuro    Dementia with behavioral disturbance (Chronic)    Bilateral carpal tunnel syndrome (Chronic)    Relevant Medications     naproxen sodium (ANAPROX) 550 MG tablet       Psychiatric    Recurrent major depressive disorder, in partial remission (Chronic)       Derm    Plaque psoriasis (Chronic)    Relevant Medications    dexAMETHasone injection 4 mg (Completed)    methylPREDNISolone acetate injection 40 mg (Completed)       Cardiac/Vascular    Essential hypertension (Chronic)    Overview      - Goal blood pressure for most patients is less than 130/85. Both numbers are important. If you have questions about your specific goal blood pressure, please ask at your clinic visits.   - Check blood pressure outside of clinic, record the numbers, and bring the log to all your office visits.   - If you have any chest pain, SOB, or other concerning symptoms, report these immediately, or go to the nearest ER.    - Recommend cardiovascular exercise, at least 3 times per week, for at least 15 minutes.   - Eat a heart healthy diet.            Aortic atherosclerosis (Chronic)       Endocrine    Type 2 diabetes mellitus with hyperglycemia, without long-term current use of insulin - Primary (Chronic)    Current Assessment & Plan      - Check glucose outside of clinic, record numbers, and bring log to follow up visit.    - Take medications as directed, and bring all medications to every clinic appointment.    - Eat a diabetic diet, if there are concerns about what that entails, we have a diabetic educator in our clinic.    - Cardiovascular exercise at least 3 times per week, for at least 15 minutes.    - Patient should be on a Statin medication, unless patient cannot tolerate a Statin.    - Aspirin should be taken everyday,  81mg, unless a higher dose is indicated for other medicaiton conditions. Also do not recommend Aspirin for a patient with known adverse effects from Aspirin.    - Recommend yearly, dilated eye exams for all Diabetic Patients.    - Monitor feet for calluses, abrasion, or other abnormalities. Report any concerns at every clinic visit.    -    Hemoglobin A1C   Date Value Ref Range Status   12/22/2023 7.9 (H) 4.5 - 6.6 % Final     Comment:       Normal:               <5.7%  Pre-Diabetic:       5.7% to 6.4%  Diabetic:             >6.4%  Diabetic Goal:     <7%   09/25/2023 7.0 (H) 4.5 - 6.6 % Final     Comment:       Normal:               <5.7%  Pre-Diabetic:       5.7% to 6.4%  Diabetic:             >6.4%  Diabetic Goal:     <7%   04/10/2023 7.2 (H) 4.5 - 6.6 % Final     Comment:       Normal:               <5.7%  Pre-Diabetic:       5.7% to 6.4%  Diabetic:             >6.4%  Diabetic Goal:     <7%               Relevant Orders    Hemoglobin A1C    Class 2 severe obesity due to excess calories with serious comorbidity and body mass index (BMI) of 36.0 to 36.9 in adult (Chronic)       Orthopedic    Neck pain (Chronic)    Relevant Medications    naproxen sodium (ANAPROX) 550 MG tablet    Primary osteoarthritis of both hips (Chronic)    Relevant Orders    X-Ray Hip 3 or 4 views Bilateral (Completed)       Health Maintenance Topics with due status: Not Due       Topic Last Completion Date    TETANUS VACCINE 05/19/2015    DEXA Scan 04/29/2022    Lipid Panel 01/11/2024       Procedures     Future Appointments   Date Time Provider Department Center   5/7/2024 10:30 AM [facility] XR4 DEXA [facility] XRAY [place] [facility]   5/10/2024  8:00 AM AWV NURSE, [facility] FAMILY MEDICINE [place] [facility] [place]   7/22/2024  1:30 PM Rebekah Garcia MD [facility]   8/1/2024  8:30 AM [facility] [place] [facility]        Follow up in about 3 months (around 8/2/2024) for chronic health problems, hypertension.     Signature:  Salty Barahona MD  [address] [place] [address]  Phone #: [phone]  Fax #: [phone]    Date of encounter: 5/2/24    There are no Patient Instructions on file for this visit.

## 2024-05-02 NOTE — PROGRESS NOTES
Salty Barahona MD    [address] [place] [address]     PATIENT NAME: Dayanara Monae  : 1945  DATE: 24  MRN: [id number]      Billing Provider: Salty Barahona MD  Level of Service: [place] OFFICE/OUTPT VISIT, EST, LEVL IV, 30-39 MIN  Patient PCP Information       Provider PCP Type    Salty Barahona MD General            Reason for Visit / Chief Complaint: Rash (On her arms and stomach area. Itching, red rash)       Update PCP  Update Chief Complaint         History of Present Illness / Problem Focused Workflow     Dayanara Monae presents to the clinic with Rash (On her arms and stomach area. Itching, red rash)     HPI    Review of Systems     Review of Systems     Medical / Social / Family History     Past Medical History:   Diagnosis Date    Actinic keratoses     Atrial fibrillation     CVA (cerebral vascular accident)     Epilepsy     Non-melanoma skin cancer     Osteoporosis     Skin cancer        Past Surgical History:   Procedure Laterality Date    APPENDECTOMY      carpel tunnel Right     CHOLECYSTECTOMY      EYE SURGERY      HAND SURGERY      HYSTERECTOMY      JOINT REPLACEMENT         Social History  Ms.  reports that she has never smoked. She has never been exposed to tobacco smoke. She has never used smokeless tobacco. She reports that she does not drink alcohol and does not use drugs.    Family History  Ms.'s family history includes Diabetes in her sister; Hypertension in her mother; Stroke in her father.    Medications and Allergies     Medications  Current Outpatient Medications   Medication Sig Dispense Refill    acitretin (SORIATANE) 25 MG capsule Take one 25mg capsule PO daily for Psoriasis 90 capsule 1    albuterol (VENTOLIN HFA) 90 mcg/actuation inhaler Inhale 2 puffs into the lungs every 6 (six) hours as needed for Wheezing. Or COUGH 18 g 5    aspirin (ECOTRIN) 81 MG EC tablet Take 81 mg by mouth once daily.      blood sugar  diagnostic (FREESTYLE LITE STRIPS) Strp 1 each by Misc.(Non-Drug; Combo Route) route 2 (two) times a day. 100 each 2    blood-glucose meter (BLOOD GLUCOSE MONITOR KIT MISC) 1 m by Misc.(Non-Drug; Combo Route) route.      calcium carb-mag hydrox-simeth 1,200 mg-270 mg -80 mg/10 mL Susp 1 tablet.      chlorhexidine (PERIDEX) 0.12 % solution Use as directed 15 mLs in the mouth or throat 2 (two) times a day.      citalopram (CELEXA) 20 MG tablet Take 1 tablet (20 mg total) by mouth once daily. For MOOD 90 tablet 1    clobetasoL (TEMOVATE) 0.05 % cream Apply to AA on body BID PRN flares tapering with improvement 60 g 6    cyanocobalamin (VITAMIN B-12) 1000 MCG tablet Take 1,000 mcg by mouth.      donepeziL (ARICEPT) 5 MG tablet Take 1 tablet (5 mg total) by mouth once daily. For MEMORY 90 tablet 0    DULoxetine (CYMBALTA) 30 MG capsule TAKE 1 CAPSULE BY MOUTH ONCE DAILY FOR  JOINT  PAIN 90 capsule 0    fluticasone propionate (FLONASE) 50 mcg/actuation nasal spray 1 spray (50 mcg total) by Each Nostril route once daily. 16 g 5    hydroCHLOROthiazide (HYDRODIURIL) 25 MG tablet Take 1 tablet (25 mg total) by mouth once daily. For BLOOD PRESSURE 90 tablet 0    HYDROcodone-acetaminophen (NORCO) 7.5-325 mg per tablet Take 1 tablet by mouth every 8 (eight) hours as needed. SEVERE PAIN 18 tablet 0    ketoconazole (NIZORAL) 2 % cream Apply topically once daily. 60 g 5    lancets 33 gauge Misc 1 lancet by Misc.(Non-Drug; Combo Route) route once daily. 100 each 1    meloxicam (MOBIC) 15 MG tablet Take 1 tablet (15 mg total) by mouth daily as needed for Pain. And INFLAMMATION 90 tablet 0    metFORMIN (GLUCOPHAGE-XR) 500 MG ER 24hr tablet Take 1 tablet (500 mg total) by mouth 2 (two) times daily with meals. For diabetes 180 tablet 0    metoprolol tartrate (LOPRESSOR) 25 MG tablet Take 1 tablet (25 mg total) by mouth 2 (two) times daily. For HEART 180 tablet 1    mupirocin (BACTROBAN) 2 % ointment Apply to affected areas three times  daily 30 g 2    naproxen sodium (ANAPROX) 550 MG tablet Take 550 mg by mouth 2 (two) times daily with meals.      omeprazole (PRILOSEC) 40 MG capsule Take 1 capsule (40 mg total) by mouth once daily. For STOMACH 90 capsule 0    potassium chloride (K-TAB) 20 mEq Take 1 tablet (20 mEq total) by mouth once daily. 90 tablet 0    rosuvastatin (CRESTOR) 20 MG tablet Take 1 tablet (20 mg total) by mouth every evening. For CHOLESTEROL 90 tablet 0    travoprost (TRAVATAN Z) 0.004 % ophthalmic solution Place 1 drop into both eyes every evening.      predniSONE (DELTASONE) 20 MG tablet Take 1 tablet (20 mg total) by mouth once daily. (Patient not taking: Reported on 5/2/2024) 5 tablet 0     No current facility-administered medications for this visit.       Allergies  Review of patient's allergies indicates:   Allergen Reactions    Vaseline carbolated petroleum Blisters    Adhesive tape-silicones     Bactrim [sulfamethoxazole-trimethoprim]        Physical Examination     Vitals:    05/02/24 1228   BP: 134/70   Pulse: 68   Resp: 16   Temp: 97.2 °F (36.2 °C)     Physical Exam       Assessment and Plan (including Health Maintenance)      Problem List  Smart Sets  Document Outside HM   :    Plan:         Health Maintenance Due   Topic Date Due    Shingles Vaccine (1 of 2) Never done    RSV Vaccine (Age 60+ and Pregnant patients) (1 - 1-dose 60+ series) Never done    COVID-19 Vaccine (4 - 2023-24 season) 09/01/2023    Diabetes Urine Screening  09/19/2023    Hemoglobin A1c  03/22/2024    Eye Exam  06/19/2024       Problem List Items Addressed This Visit          Neuro    Dementia with behavioral disturbance (Chronic)       Psychiatric    Recurrent major depressive disorder, in partial remission (Chronic)       Cardiac/Vascular    Essential hypertension (Chronic)    Overview      - Goal blood pressure for most patients is less than 130/85. Both numbers are important. If you have questions about your specific goal blood pressure,  please ask at your clinic visits.   - Check blood pressure outside of clinic, record the numbers, and bring the log to all your office visits.   - If you have any chest pain, SOB, or other concerning symptoms, report these immediately, or go to the nearest ER.    - Recommend cardiovascular exercise, at least 3 times per week, for at least 15 minutes.   - Eat a heart healthy diet.            Aortic atherosclerosis (Chronic)       Endocrine    Type 2 diabetes mellitus with hyperglycemia, without long-term current use of insulin - Primary (Chronic)    Current Assessment & Plan      - Check glucose outside of clinic, record numbers, and bring log to follow up visit.    - Take medications as directed, and bring all medications to every clinic appointment.    - Eat a diabetic diet, if there are concerns about what that entails, we have a diabetic educator in our clinic.    - Cardiovascular exercise at least 3 times per week, for at least 15 minutes.    - Patient should be on a Statin medication, unless patient cannot tolerate a Statin.    - Aspirin should be taken everyday,  81mg, unless a higher dose is indicated for other medicaiton conditions. Also do not recommend Aspirin for a patient with known adverse effects from Aspirin.    - Recommend yearly, dilated eye exams for all Diabetic Patients.    - Monitor feet for calluses, abrasion, or other abnormalities. Report any concerns at every clinic visit.    -   Hemoglobin A1C   Date Value Ref Range Status   12/22/2023 7.9 (H) 4.5 - 6.6 % Final     Comment:       Normal:               <5.7%  Pre-Diabetic:       5.7% to 6.4%  Diabetic:             >6.4%  Diabetic Goal:     <7%   09/25/2023 7.0 (H) 4.5 - 6.6 % Final     Comment:       Normal:               <5.7%  Pre-Diabetic:       5.7% to 6.4%  Diabetic:             >6.4%  Diabetic Goal:     <7%   04/10/2023 7.2 (H) 4.5 - 6.6 % Final     Comment:       Normal:               <5.7%  Pre-Diabetic:       5.7% to 6.4%  Diabetic:              >6.4%  Diabetic Goal:     <7%               Class 2 severe obesity due to excess calories with serious comorbidity and body mass index (BMI) of 36.0 to 36.9 in adult (Chronic)       Health Maintenance Topics with due status: Not Due       Topic Last Completion Date    TETANUS VACCINE 05/19/2015    DEXA Scan 04/29/2022    Lipid Panel 01/11/2024       Procedures     Future Appointments   Date Time Provider Department Center   5/7/2024 10:30 AM [facility] XR4 DEXA [facility] XRAY [place]   5/10/2024  8:00 AM AWV NURSE, [facility] FAMILY MEDICINE [facility] [place]   7/22/2024  1:30 PM Rebekah Garcia MD [facility]   8/1/2024  8:30 AM INFUSION, [facility] INFUSN [place]        Follow up in about 3 months (around 8/2/2024) for chronic health problems, hypertension.     Signature:  Darleen Webster LPN  [address] [place] [address]  Phone #: [phone]  Fax #: [phone]    Date of encounter: 5/2/24    There are no Patient Instructions on file for this visit.

## 2024-05-02 NOTE — ASSESSMENT & PLAN NOTE
- Check glucose outside of clinic, record numbers, and bring log to follow up visit.    - Take medications as directed, and bring all medications to every clinic appointment.    - Eat a diabetic diet, if there are concerns about what that entails, we have a diabetic educator in our clinic.    - Cardiovascular exercise at least 3 times per week, for at least 15 minutes.    - Patient should be on a Statin medication, unless patient cannot tolerate a Statin.    - Aspirin should be taken everyday,  81mg, unless a higher dose is indicated for other medicaiton conditions. Also do not recommend Aspirin for a patient with known adverse effects from Aspirin.    - Recommend yearly, dilated eye exams for all Diabetic Patients.    - Monitor feet for calluses, abrasion, or other abnormalities. Report any concerns at every clinic visit.    -   Hemoglobin A1C   Date Value Ref Range Status   12/22/2023 7.9 (H) 4.5 - 6.6 % Final     Comment:       Normal:               <5.7%  Pre-Diabetic:       5.7% to 6.4%  Diabetic:             >6.4%  Diabetic Goal:     <7%   09/25/2023 7.0 (H) 4.5 - 6.6 % Final     Comment:       Normal:               <5.7%  Pre-Diabetic:       5.7% to 6.4%  Diabetic:             >6.4%  Diabetic Goal:     <7%   04/10/2023 7.2 (H) 4.5 - 6.6 % Final     Comment:       Normal:               <5.7%  Pre-Diabetic:       5.7% to 6.4%  Diabetic:             >6.4%  Diabetic Goal:     <7%

## 2024-05-03 DIAGNOSIS — E11.65 TYPE 2 DIABETES MELLITUS WITH HYPERGLYCEMIA, WITHOUT LONG-TERM CURRENT USE OF INSULIN: Primary | Chronic | ICD-10-CM

## 2024-05-03 RX ORDER — DAPAGLIFLOZIN 5 MG/1
5 TABLET, FILM COATED ORAL DAILY
Qty: 30 TABLET | Refills: 2 | Status: SHIPPED | OUTPATIENT
Start: 2024-05-03

## 2024-05-03 NOTE — TELEPHONE ENCOUNTER
----- Message from Salty Barahona MD sent at 5/3/2024 12:13 PM CDT -----  Diabetes remains uncontrolled    Likely need to add to her treatment, farxiga 5mg is what I recommend

## 2024-05-07 ENCOUNTER — HOSPITAL ENCOUNTER (OUTPATIENT)
Dept: RADIOLOGY | Facility: HOSPITAL | Age: 79
Discharge: HOME | End: 2024-05-07
Attending: FAMILY MEDICINE
Payer: MEDICARE

## 2024-05-07 DIAGNOSIS — M81.0 OSTEOPOROSIS, UNSPECIFIED OSTEOPOROSIS TYPE, UNSPECIFIED PATHOLOGICAL FRACTURE PRESENCE: ICD-10-CM

## 2024-05-07 PROCEDURE — 77080 DXA BONE DENSITY AXIAL: CPT | Mod: TC

## 2024-05-10 ENCOUNTER — OFFICE VISIT (OUTPATIENT)
Dept: FAMILY MEDICINE | Facility: CLINIC | Age: 79
End: 2024-05-10
Payer: MEDICARE

## 2024-05-10 VITALS
WEIGHT: 177.63 LBS | DIASTOLIC BLOOD PRESSURE: 72 MMHG | HEART RATE: 68 BPM | TEMPERATURE: 98 F | SYSTOLIC BLOOD PRESSURE: 138 MMHG | HEIGHT: 59 IN | OXYGEN SATURATION: 97 % | BODY MASS INDEX: 35.81 KG/M2 | RESPIRATION RATE: 14 BRPM

## 2024-05-10 DIAGNOSIS — E78.2 MIXED HYPERLIPIDEMIA: ICD-10-CM

## 2024-05-10 DIAGNOSIS — E87.6 LOW BLOOD POTASSIUM: ICD-10-CM

## 2024-05-10 DIAGNOSIS — I10 ESSENTIAL HYPERTENSION: ICD-10-CM

## 2024-05-10 DIAGNOSIS — M81.0 OSTEOPOROSIS, UNSPECIFIED OSTEOPOROSIS TYPE, UNSPECIFIED PATHOLOGICAL FRACTURE PRESENCE: ICD-10-CM

## 2024-05-10 DIAGNOSIS — E11.65 TYPE 2 DIABETES MELLITUS WITH HYPERGLYCEMIA, WITHOUT LONG-TERM CURRENT USE OF INSULIN: ICD-10-CM

## 2024-05-10 DIAGNOSIS — F03.918 DEMENTIA WITH BEHAVIORAL DISTURBANCE: ICD-10-CM

## 2024-05-10 DIAGNOSIS — Z00.00 ENCOUNTER FOR SUBSEQUENT ANNUAL WELLNESS VISIT (AWV) IN MEDICARE PATIENT: Primary | ICD-10-CM

## 2024-05-10 PROCEDURE — G0439 PPPS, SUBSEQ VISIT: HCPCS | Mod: ,,, | Performed by: FAMILY MEDICINE

## 2024-05-10 RX ORDER — POTASSIUM CHLORIDE 1500 MG/1
20 TABLET, EXTENDED RELEASE ORAL DAILY
Qty: 90 TABLET | Refills: 1 | Status: SHIPPED | OUTPATIENT
Start: 2024-05-10

## 2024-05-10 NOTE — PATIENT INSTRUCTIONS
Counseling and Referral of Other Preventative  (Italic type indicates deductible and co-insurance are waived)    Patient Name: Dayanara Monae  Today's Date: 5/10/2024    Health Maintenance       Date Due Completion Date    Shingles Vaccine (1 of 2) Never done ---    RSV Vaccine (Age 60+ and Pregnant patients) (1 - 1-dose 60+ series) Never done ---    COVID-19 Vaccine (4 - 2023-24 season) 09/01/2023 2/4/2022    Diabetes Urine Screening 09/19/2023 9/19/2022    Eye Exam 06/19/2024 6/19/2023    Override on 4/20/2022: Done    Hemoglobin A1c 08/02/2024 5/2/2024    Lipid Panel 01/11/2025 1/11/2024    TETANUS VACCINE 05/19/2025 5/19/2015    DEXA Scan 05/07/2027 5/7/2024        No orders of the defined types were placed in this encounter.    The following information is provided to all patients.  This information is to help you find resources for any of the problems found today that may be affecting your health:                  Living healthy guide: [place].gov    Understanding Diabetes: www.diabetes.org      Eating healthy: www.cdc.gov/healthyweight      CDC home safety checklist: www.cdc.gov/steadi/patient.html      Agency on Aging: [place].gov    Alcoholics anonymous (AA): www.aa.org      Physical Activity: www.dale.nih.gov/sk9pnvf      Tobacco use: [place].gov

## 2024-05-10 NOTE — PROGRESS NOTES
"Dayanara Monae presented for a  Medicare AWV and comprehensive Health Risk Assessment today. The following components were reviewed and updated:    Medical history  Family History  Social history  Allergies and Current Medications  Health Risk Assessment  Health Maintenance  Care Team         ** See Completed Assessments for Annual Wellness Visit within the encounter summary.**         The following assessments were completed:  Living Situation  CAGE  Depression Screening  Timed Get Up and Go  Whisper Test  Cognitive Function Screening  Nutrition Screening  ADL Screening  PAQ Screening        Opioid Documentation    does not have a current opioid prescription.       Vitals:    05/10/24 1329   BP: 138/72   Pulse: 68   Resp: 14   Temp: 98 °F (36.7 °C)   SpO2: 97%   Weight: 80.6 kg (177 lb 9.6 oz)   Height: 4' 11" (1.499 m)     Body mass index is 35.87 kg/m².  Physical Exam  Constitutional:       Appearance: Normal appearance.   HENT:      Head: Normocephalic and atraumatic.   Eyes:      Extraocular Movements: Extraocular movements intact.   Cardiovascular:      Rate and Rhythm: Normal rate and regular rhythm.   Pulmonary:      Effort: Pulmonary effort is normal.      Breath sounds: Normal breath sounds.   Skin:     General: Skin is warm.   Neurological:      Mental Status: She is alert and oriented to person, place, and time. Mental status is at baseline.               Diagnoses and health risks identified today and associated recommendations/orders:    Problem List Items Addressed This Visit          Neuro    Dementia with behavioral disturbance (Chronic)       Cardiac/Vascular    Essential hypertension (Chronic)    Mixed hyperlipidemia (Chronic)       Endocrine    Type 2 diabetes mellitus with hyperglycemia, without long-term current use of insulin (Chronic)    Relevant Orders    Microalbumin/Creatinine Ratio, Urine    BMI 35.0-35.9,adult (Chronic)     Other Visit Diagnoses       Encounter for subsequent annual " wellness visit (AWV) in Medicare patient    -  Primary    Osteoporosis, unspecified osteoporosis type, unspecified pathological fracture presence        Low blood potassium        Relevant Medications    potassium chloride (K-TAB) 20 mEq        Chronic health problems are stable, no changes made today    Provided [place] with a 5-10 year written screening schedule and personal prevention plan. Recommendations were developed using the USPSTF age appropriate recommendations. Education, counseling, and referrals were provided as needed. After Visit Summary printed and given to patient which includes a list of additional screenings\tests needed.    Follow up for 1 year for Annual Wellness Visit.    Salty Barahona MD     I offered to discuss advanced care planning, including how to pick a person who would make decisions for you if you were unable to make them for yourself, called a health care power of , and what kind of decisions you might make such as use of life sustaining treatments such as ventilators and tube feeding when faced with a life limiting illness recorded on a living will that they will need to know. (How you want to be cared for as you near the end of your natural life)     X Patient is interested in learning more about how to make advanced directives.  I provided them paperwork and offered to discuss this with them.

## 2024-05-16 ENCOUNTER — OFFICE VISIT (OUTPATIENT)
Dept: FAMILY MEDICINE | Facility: CLINIC | Age: 79
End: 2024-05-16
Payer: MEDICARE

## 2024-05-16 VITALS
DIASTOLIC BLOOD PRESSURE: 60 MMHG | BODY MASS INDEX: 35.08 KG/M2 | TEMPERATURE: 98 F | HEIGHT: 59 IN | WEIGHT: 174 LBS | OXYGEN SATURATION: 95 % | HEART RATE: 65 BPM | SYSTOLIC BLOOD PRESSURE: 116 MMHG

## 2024-05-16 DIAGNOSIS — R19.5 DARK STOOLS: ICD-10-CM

## 2024-05-16 DIAGNOSIS — K59.00 CONSTIPATION, UNSPECIFIED CONSTIPATION TYPE: Primary | ICD-10-CM

## 2024-05-16 PROCEDURE — 99213 OFFICE O/P EST LOW 20 MIN: CPT | Mod: ,,, | Performed by: FAMILY MEDICINE

## 2024-05-16 RX ORDER — POLYETHYLENE GLYCOL 3350 17 G/17G
17 POWDER, FOR SOLUTION ORAL 2 TIMES DAILY PRN
Qty: 30 PACKET | Refills: 2 | Status: SHIPPED | OUTPATIENT
Start: 2024-05-16

## 2024-05-16 NOTE — PROGRESS NOTES
Salty Barahona MD    [address] [place] [address]     PATIENT NAME: Dayanara Monae  : 1945  DATE: 24  MRN: [id number]      Billing Provider: Salty Barahona MD  Level of Service: [place] OFFICE/OUTPT VISIT, EST, LEVL III, 20-29 MIN  Patient PCP Information       Provider PCP Type    Salty Barahona MD General            Reason for Visit / Chief Complaint: Constipation (Constipation x 3 days. Pt states she had a small bowel movement on today. She states daughter suggested her to come see PCP. She has tried milk magnesia. ) and Abdominal Pain (Right side abdominal pain x4 days. )       Update PCP  Update Chief Complaint         History of Present Illness / Problem Focused Workflow     Dayanara Monae presents to the clinic with Constipation (Constipation x 3 days. Pt states she had a small bowel movement on today. She states daughter suggested her to come see PCP. She has tried milk magnesia. ) and Abdominal Pain (Right side abdominal pain x4 days. )     Constipation - not having regular Bowel Movements the past week, she has been able to get a little stool out, she states there is a dark color to her stool, almost black in color         HPI    Review of Systems     Review of Systems   Constitutional:  Negative for activity change, appetite change, chills, fatigue and fever.   HENT:  Negative for nasal congestion, ear pain, hearing loss, postnasal drip and sore throat.    Respiratory:  Negative for cough, chest tightness, shortness of breath and wheezing.    Cardiovascular:  Negative for chest pain, palpitations, leg swelling and claudication.   Gastrointestinal:  Positive for abdominal pain and constipation. Negative for change in bowel habit, diarrhea, nausea and vomiting.   Genitourinary:  Negative for dysuria.   Musculoskeletal:  Negative for arthralgias, back pain, gait problem and myalgias.   Integumentary:  Negative for rash.    Neurological:  Negative for weakness and headaches.   Psychiatric/Behavioral:  Negative for suicidal ideas. The patient is not nervous/anxious.         Medical / Social / Family History     Past Medical History:   Diagnosis Date    Actinic keratoses     Atrial fibrillation     CVA (cerebral vascular accident)     Epilepsy     Non-melanoma skin cancer     Osteoporosis     Skin cancer        Past Surgical History:   Procedure Laterality Date    APPENDECTOMY      carpel tunnel Right     CHOLECYSTECTOMY      EYE SURGERY      HAND SURGERY      HYSTERECTOMY      JOINT REPLACEMENT         Social History  Ms.  reports that she has never smoked. She has never been exposed to tobacco smoke. She has never used smokeless tobacco. She reports that she does not drink alcohol and does not use drugs.    Family History  Ms.'s family history includes Diabetes in her sister; Hypertension in her mother; Stroke in her father.    Medications and Allergies     Medications  Outpatient Medications Marked as Taking for the 5/16/24 encounter (Office Visit) with Salty Barahona MD   Medication Sig Dispense Refill    acitretin (SORIATANE) 25 MG capsule Take one 25mg capsule PO daily for Psoriasis 90 capsule 1    albuterol (VENTOLIN HFA) 90 mcg/actuation inhaler Inhale 2 puffs into the lungs every 6 (six) hours as needed for Wheezing. Or COUGH 18 g 5    blood sugar diagnostic (FREESTYLE LITE STRIPS) Strp 1 each by Misc.(Non-Drug; Combo Route) route 2 (two) times a day. 100 each 2    blood-glucose meter (BLOOD GLUCOSE MONITOR KIT MISC) 1 m by Misc.(Non-Drug; Combo Route) route.      calcium carb-mag hydrox-simeth 1,200 mg-270 mg -80 mg/10 mL Susp 1 tablet.      chlorhexidine (PERIDEX) 0.12 % solution Use as directed 15 mLs in the mouth or throat 2 (two) times a day.      citalopram (CELEXA) 20 MG tablet Take 1 tablet (20 mg total) by mouth once daily. For MOOD 90 tablet 1    clobetasoL (TEMOVATE) 0.05 % cream Apply to AA on body BID PRN  flares tapering with improvement 60 g 6    cyanocobalamin (VITAMIN B-12) 1000 MCG tablet Take 1,000 mcg by mouth.      dapagliflozin propanediol (FARXIGA) 5 mg Tab tablet Take 1 tablet (5 mg total) by mouth once daily. 30 tablet 2    donepeziL (ARICEPT) 5 MG tablet Take 1 tablet (5 mg total) by mouth once daily. For MEMORY 90 tablet 0    DULoxetine (CYMBALTA) 30 MG capsule TAKE 1 CAPSULE BY MOUTH ONCE DAILY FOR  JOINT  PAIN 90 capsule 0    fluticasone propionate (FLONASE) 50 mcg/actuation nasal spray 1 spray (50 mcg total) by Each Nostril route once daily. 16 g 5    hydroCHLOROthiazide (HYDRODIURIL) 25 MG tablet Take 1 tablet (25 mg total) by mouth once daily. For BLOOD PRESSURE 90 tablet 0    ketoconazole (NIZORAL) 2 % cream Apply topically once daily. 60 g 5    lancets 33 gauge Misc 1 lancet by Misc.(Non-Drug; Combo Route) route once daily. 100 each 1    meloxicam (MOBIC) 15 MG tablet Take 1 tablet (15 mg total) by mouth daily as needed for Pain. And INFLAMMATION 90 tablet 0    metFORMIN (GLUCOPHAGE-XR) 500 MG ER 24hr tablet Take 1 tablet (500 mg total) by mouth 2 (two) times daily with meals. For diabetes 180 tablet 0    metoprolol tartrate (LOPRESSOR) 25 MG tablet Take 1 tablet (25 mg total) by mouth 2 (two) times daily. For HEART 180 tablet 1    mupirocin (BACTROBAN) 2 % ointment Apply to affected areas three times daily 30 g 2    naproxen sodium (ANAPROX) 550 MG tablet Take 1 tablet (550 mg total) by mouth 2 (two) times daily with meals. For PAIN and INFLAMMATION 180 tablet 1    omeprazole (PRILOSEC) 40 MG capsule Take 1 capsule (40 mg total) by mouth once daily. For STOMACH 90 capsule 0    potassium chloride (K-TAB) 20 mEq Take 1 tablet (20 mEq total) by mouth once daily. 90 tablet 1    rosuvastatin (CRESTOR) 20 MG tablet Take 1 tablet (20 mg total) by mouth every evening. For CHOLESTEROL 90 tablet 0    travoprost (TRAVATAN Z) 0.004 % ophthalmic solution Place 1 drop into both eyes every evening.          Allergies  Review of patient's allergies indicates:   Allergen Reactions    Vaseline carbolated petroleum Blisters    Adhesive tape-silicones     Bactrim [sulfamethoxazole-trimethoprim]        Physical Examination     Vitals:    05/16/24 1333   BP: 116/60   Pulse: 65   Temp: 97.8 °F (36.6 °C)     Physical Exam  Vitals and nursing note reviewed.   Constitutional:       General: She is not in acute distress.     Appearance: Normal appearance. She is not ill-appearing.   Eyes:      Extraocular Movements: Extraocular movements intact.      Pupils: Pupils are equal, round, and reactive to light.   Cardiovascular:      Rate and Rhythm: Normal rate and regular rhythm.      Heart sounds: Normal heart sounds.   Pulmonary:      Effort: Pulmonary effort is normal.      Breath sounds: Normal breath sounds.   Abdominal:      General: Bowel sounds are normal.      Palpations: Abdomen is soft.   Musculoskeletal:         General: Normal range of motion.   Skin:     Findings: No rash.   Neurological:      General: No focal deficit present.      Mental Status: She is alert and oriented to person, place, and time. Mental status is at baseline.   Psychiatric:         Mood and Affect: Mood normal.         Behavior: Behavior normal.            Assessment and Plan (including Health Maintenance)      Problem List  Smart Sets  Document Outside HM   :    Plan:     Hemacult stool ordered for dark colored stool.     Miralax sent in. She will take the rest of mag citrate she has tonight, and then start miralax until her stool becomes watery    Health Maintenance Due   Topic Date Due    Shingles Vaccine (1 of 2) Never done    RSV Vaccine (Age 60+ and Pregnant patients) (1 - 1-dose 60+ series) Never done    COVID-19 Vaccine (4 - 2023-24 season) 09/01/2023    Diabetes Urine Screening  09/19/2023    Eye Exam  06/19/2024       Problem List Items Addressed This Visit    None  Visit Diagnoses       Constipation, unspecified constipation type     -  Primary    Relevant Medications    polyethylene glycol (GLYCOLAX) 17 gram PwPk    Other Relevant Orders    Occult blood x 1, stool    Occult blood x 1, stool    Occult blood x 1, stool    Dark stools                Health Maintenance Topics with due status: Not Due       Topic Last Completion Date    TETANUS VACCINE 05/19/2015    Lipid Panel 01/11/2024    Hemoglobin A1c 05/02/2024    DEXA Scan 05/07/2024       Procedures     Future Appointments   Date Time Provider Department Center   7/22/2024  1:30 PM Rebekah Garcia MD [facility]   8/1/2024  8:30 AM INFUSION, [facility] INFUSN [place]   8/2/2024 10:45 [facility]        Follow up if symptoms worsen or fail to improve.     Signature:  Salty Barahona MD  [address] [place] [address]  Phone #: [phone]  Fax #: [phone]    Date of encounter: 5/16/24    There are no Patient Instructions on file for this visit.

## 2024-05-21 ENCOUNTER — TELEPHONE (OUTPATIENT)
Dept: FAMILY MEDICINE | Facility: CLINIC | Age: 79
End: 2024-05-21
Payer: MEDICARE

## 2024-05-22 DIAGNOSIS — K21.9 GASTROESOPHAGEAL REFLUX DISEASE, UNSPECIFIED WHETHER ESOPHAGITIS PRESENT: Chronic | ICD-10-CM

## 2024-05-23 RX ORDER — OMEPRAZOLE 40 MG/1
40 CAPSULE, DELAYED RELEASE ORAL DAILY
Qty: 90 CAPSULE | Refills: 1 | Status: SHIPPED | OUTPATIENT
Start: 2024-05-23

## 2024-06-03 DIAGNOSIS — R19.5 DARK STOOLS: Primary | ICD-10-CM

## 2024-06-03 LAB
OCCULT BLOOD: NEGATIVE

## 2024-06-03 PROCEDURE — 82272 OCCULT BLD FECES 1-3 TESTS: CPT | Mod: QW,ICN,, | Performed by: CLINICAL MEDICAL LABORATORY

## 2024-06-13 ENCOUNTER — TELEPHONE (OUTPATIENT)
Dept: FAMILY MEDICINE | Facility: CLINIC | Age: 79
End: 2024-06-13
Payer: MEDICARE

## 2024-06-13 NOTE — TELEPHONE ENCOUNTER
----- Message from Lennie Peralta sent at 6/13/2024 11:45 AM CDT -----  Regarding: pt call back  Please give pt a call at [phone]. She didn't specify the reason, but she said she forgot her letter at home but she wants to give some info to Dr Barahona.

## 2024-06-19 DIAGNOSIS — E11.9 TYPE 2 DIABETES MELLITUS WITHOUT COMPLICATION, WITHOUT LONG-TERM CURRENT USE OF INSULIN: Chronic | ICD-10-CM

## 2024-06-19 RX ORDER — METFORMIN HYDROCHLORIDE 500 MG/1
500 TABLET, EXTENDED RELEASE ORAL 2 TIMES DAILY WITH MEALS
Qty: 60 TABLET | Refills: 0 | Status: SHIPPED | OUTPATIENT
Start: 2024-06-19

## 2024-06-19 NOTE — TELEPHONE ENCOUNTER
Pt arrived to clinic asking to speak to nurse and requesting Metformin refill.  PCP is out of clinic until next week.  Sending request to CHARLEE Nino, for 1 month and notified pt that further refill requests will need to go to PCP. Understanding verbalized.

## 2024-07-09 DIAGNOSIS — Z71.89 COMPLEX CARE COORDINATION: ICD-10-CM

## 2024-07-22 ENCOUNTER — OFFICE VISIT (OUTPATIENT)
Dept: DERMATOLOGY | Facility: CLINIC | Age: 79
End: 2024-07-22
Payer: MEDICARE

## 2024-07-22 VITALS — RESPIRATION RATE: 18 BRPM | BODY MASS INDEX: 35.07 KG/M2 | WEIGHT: 173.94 LBS | HEIGHT: 59 IN

## 2024-07-22 DIAGNOSIS — L57.0 ACTINIC KERATOSES: ICD-10-CM

## 2024-07-22 DIAGNOSIS — E11.9 TYPE 2 DIABETES MELLITUS WITHOUT COMPLICATION, WITHOUT LONG-TERM CURRENT USE OF INSULIN: Chronic | ICD-10-CM

## 2024-07-22 DIAGNOSIS — L08.9 SKIN INFECTION: ICD-10-CM

## 2024-07-22 DIAGNOSIS — Z79.899 HIGH RISK MEDICATION USE: Primary | ICD-10-CM

## 2024-07-22 DIAGNOSIS — I10 ESSENTIAL HYPERTENSION: Chronic | ICD-10-CM

## 2024-07-22 DIAGNOSIS — L40.9 PSORIASIS: ICD-10-CM

## 2024-07-22 DIAGNOSIS — B35.3 TINEA PEDIS OF BOTH FEET: ICD-10-CM

## 2024-07-22 DIAGNOSIS — L40.0 PLAQUE PSORIASIS: Chronic | ICD-10-CM

## 2024-07-22 PROCEDURE — 17000 DESTRUCT PREMALG LESION: CPT | Mod: ,,, | Performed by: DERMATOLOGY

## 2024-07-22 PROCEDURE — 87077 CULTURE AEROBIC IDENTIFY: CPT | Mod: ,,, | Performed by: CLINICAL MEDICAL LABORATORY

## 2024-07-22 PROCEDURE — 87186 SC STD MICRODIL/AGAR DIL: CPT | Mod: ,,, | Performed by: CLINICAL MEDICAL LABORATORY

## 2024-07-22 PROCEDURE — 99214 OFFICE O/P EST MOD 30 MIN: CPT | Mod: 25,,, | Performed by: DERMATOLOGY

## 2024-07-22 PROCEDURE — 87070 CULTURE OTHR SPECIMN AEROBIC: CPT | Mod: ,,, | Performed by: CLINICAL MEDICAL LABORATORY

## 2024-07-22 RX ORDER — ACITRETIN 25 MG/1
CAPSULE ORAL
Qty: 90 CAPSULE | Refills: 1 | Status: SHIPPED | OUTPATIENT
Start: 2024-07-22

## 2024-07-22 RX ORDER — METOPROLOL TARTRATE 25 MG/1
25 TABLET, FILM COATED ORAL 2 TIMES DAILY
Qty: 180 TABLET | Refills: 1 | Status: SHIPPED | OUTPATIENT
Start: 2024-07-22

## 2024-07-22 RX ORDER — KETOCONAZOLE 20 MG/G
CREAM TOPICAL
Qty: 60 G | Refills: 2 | Status: SHIPPED | OUTPATIENT
Start: 2024-07-22

## 2024-07-22 RX ORDER — CLOBETASOL PROPIONATE 0.5 MG/G
CREAM TOPICAL
Qty: 60 G | Refills: 2 | Status: SHIPPED | OUTPATIENT
Start: 2024-07-22

## 2024-07-22 RX ORDER — METFORMIN HYDROCHLORIDE 500 MG/1
500 TABLET, EXTENDED RELEASE ORAL 2 TIMES DAILY WITH MEALS
Qty: 180 TABLET | Refills: 1 | Status: SHIPPED | OUTPATIENT
Start: 2024-07-22

## 2024-07-22 NOTE — PROGRESS NOTES
[facility]   Rebekah Garcia MD    Patient Name: Dayanara Monae  Patient YOB: 1945   Date of Service: 7/22/24    CC: Follow-up Psoriasis    HPI: Dayanara Monae is a 78 y.o. female here today for follow-up of psoriasis, last seen 1/10/2024.  Previous treatments include Acitretin. Patient has been off of medication for about a month due to misplacement   Overall, the psoriasis is stable.  Treatment plan was followed as directed.    Past Medical History:   Diagnosis Date    Actinic keratoses     Atrial fibrillation     CVA (cerebral vascular accident)     Epilepsy     Non-melanoma skin cancer     Osteoporosis     Skin cancer      Past Surgical History:   Procedure Laterality Date    APPENDECTOMY      carpel tunnel Right     CHOLECYSTECTOMY      EYE SURGERY      HAND SURGERY      HYSTERECTOMY      JOINT REPLACEMENT       Review of patient's allergies indicates:   Allergen Reactions    Vaseline carbolated petroleum Blisters    Adhesive tape-silicones     Bactrim [sulfamethoxazole-trimethoprim]        Current Outpatient Medications:     acitretin (SORIATANE) 25 MG capsule, Take one 25mg capsule PO daily for Psoriasis, Disp: 90 capsule, Rfl: 1    albuterol (VENTOLIN HFA) 90 mcg/actuation inhaler, Inhale 2 puffs into the lungs every 6 (six) hours as needed for Wheezing. Or COUGH, Disp: 18 g, Rfl: 5    aspirin (ECOTRIN) 81 MG EC tablet, Take 81 mg by mouth once daily., Disp: , Rfl:     blood sugar diagnostic (FREESTYLE LITE STRIPS) Strp, 1 each by Misc.(Non-Drug; Combo Route) route 2 (two) times a day., Disp: 100 each, Rfl: 2    blood-glucose meter (BLOOD GLUCOSE MONITOR KIT MISC), 1 m by Misc.(Non-Drug; Combo Route) route., Disp: , Rfl:     calcium carb-mag hydrox-simeth 1,200 mg-270 mg -80 mg/10 mL Susp, 1 tablet., Disp: , Rfl:     chlorhexidine (PERIDEX) 0.12 % solution, Use as directed 15 mLs in the mouth or throat 2 (two) times a day., Disp: , Rfl:     citalopram (CELEXA) 20 MG tablet, Take 1  tablet (20 mg total) by mouth once daily. For MOOD, Disp: 90 tablet, Rfl: 1    clobetasoL (TEMOVATE) 0.05 % cream, Apply to AA on body BID PRN flares tapering with improvement, Disp: 60 g, Rfl: 6    clobetasoL (TEMOVATE) 0.05 % cream, Apply to rash on feet nightly., Disp: 60 g, Rfl: 2    cyanocobalamin (VITAMIN B-12) 1000 MCG tablet, Take 1,000 mcg by mouth., Disp: , Rfl:     dapagliflozin propanediol (FARXIGA) 5 mg Tab tablet, Take 1 tablet (5 mg total) by mouth once daily., Disp: 30 tablet, Rfl: 2    donepeziL (ARICEPT) 5 MG tablet, Take 1 tablet (5 mg total) by mouth once daily. For MEMORY, Disp: 90 tablet, Rfl: 0    DULoxetine (CYMBALTA) 30 MG capsule, TAKE 1 CAPSULE BY MOUTH ONCE DAILY FOR  JOINT  PAIN, Disp: 90 capsule, Rfl: 0    fluticasone propionate (FLONASE) 50 mcg/actuation nasal spray, 1 spray (50 mcg total) by Each Nostril route once daily., Disp: 16 g, Rfl: 5    hydroCHLOROthiazide (HYDRODIURIL) 25 MG tablet, Take 1 tablet (25 mg total) by mouth once daily. For BLOOD PRESSURE, Disp: 90 tablet, Rfl: 0    ketoconazole (NIZORAL) 2 % cream, Apply topically once daily., Disp: 60 g, Rfl: 5    ketoconazole (NIZORAL) 2 % cream, Apply to rash on feet daily, Disp: 60 g, Rfl: 2    lancets 33 gauge Misc, 1 lancet by Misc.(Non-Drug; Combo Route) route once daily., Disp: 100 each, Rfl: 1    meloxicam (MOBIC) 15 MG tablet, Take 1 tablet (15 mg total) by mouth daily as needed for Pain. And INFLAMMATION, Disp: 90 tablet, Rfl: 0    metFORMIN (GLUCOPHAGE-XR) 500 MG ER 24hr tablet, Take 1 tablet (500 mg total) by mouth 2 (two) times daily with meals. For diabetes, Disp: 180 tablet, Rfl: 1    metoprolol tartrate (LOPRESSOR) 25 MG tablet, Take 1 tablet (25 mg total) by mouth 2 (two) times daily. For HEART, Disp: 180 tablet, Rfl: 1    mupirocin (BACTROBAN) 2 % ointment, Apply to affected areas three times daily, Disp: 30 g, Rfl: 2    naproxen sodium (ANAPROX) 550 MG tablet, Take 1 tablet (550 mg total) by mouth 2 (two) times  daily with meals. For PAIN and INFLAMMATION, Disp: 180 tablet, Rfl: 1    omeprazole (PRILOSEC) 40 MG capsule, Take 1 capsule (40 mg total) by mouth once daily. For STOMACH, Disp: 90 capsule, Rfl: 1    polyethylene glycol (GLYCOLAX) 17 gram PwPk, Take 17 g by mouth 2 (two) times daily as needed for Constipation., Disp: 30 packet, Rfl: 2    potassium chloride (K-TAB) 20 mEq, Take 1 tablet (20 mEq total) by mouth once daily., Disp: 90 tablet, Rfl: 1    rosuvastatin (CRESTOR) 20 MG tablet, Take 1 tablet (20 mg total) by mouth every evening. For CHOLESTEROL, Disp: 90 tablet, Rfl: 0    travoprost (TRAVATAN Z) 0.004 % ophthalmic solution, Place 1 drop into both eyes every evening., Disp: , Rfl:     ROS: A focused review of systems was obtained and negative.     Exam: A focused skin exam was performed. All areas examined were normal except as mentioned in the assessment and plan below.  General Appearance of the patient is well developed and well nourished.  Orientation: alert and oriented x 3.  Mood and affect: pleasant.    Assessment:   The primary encounter diagnosis was High risk medication use. Diagnoses of Psoriasis, Tinea pedis of both feet, Skin infection, Actinic keratoses, and Plaque psoriasis were also pertinent to this visit.    Plan:   Medications Ordered This Encounter   Medications    acitretin (SORIATANE) 25 MG capsule     Sig: Take one 25mg capsule PO daily for Psoriasis     Dispense:  90 capsule     Refill:  1    clobetasoL (TEMOVATE) 0.05 % cream     Sig: Apply to rash on feet nightly.     Dispense:  60 g     Refill:  2    ketoconazole (NIZORAL) 2 % cream     Sig: Apply to rash on feet daily     Dispense:  60 g     Refill:  2     Plaque Psoriasis  - psoriasiform plaques with micaceous scale  Status: Inadequately controlled     Plan: Counseling  I counseled the patient regarding the following:  Skin care: Emollients, ambient sun exposure, shampoos with tar, selenium or zinc pyrithione can improve  psoriasis.  Expectations: Psoriasis is chronic in nature with periods of remissions and flares. Flares can be triggered by stress, infections (group A strep), certain medications and alcohol.  Contact office if: Psoriasis worsens, or fails to improve despite several months of treatment.    - Will restart Acitretin and topicals (ketoconazole cream and clobetasol)    Actinic Keratoses(L57.0)  - Erythematous patches and papules with hyperkeratotic scale distributed on the left cheek.    Plan: Counseling.  I counseled the patient regarding the following:  Skin Care: Sun protective clothing and broad spectrum sunscreen can prevent the formation of Actinic  Keratoses. AKs can resolve with cryotherapy, photodynamic therapy, imiquimod, topical 5-FU.  Expectations: Actinic Keratoses are precancerous proliferations that occur within sun damaged skin. If untreated,  a small subset of AKs can develop into Squamous Cell Carcinoma.  Contact Office if: If AKs fail to resolve despite treatment, or if you develop a side effect from therapy, such as  unbearable crusting, scabbing, redness and tenderness.    Plan: Liquid Nitrogen.  A total of 1 lesions were treated with liquid nitrogen for 2 freeze-thaw cycles lasting 5 seconds, located on the above locations.   The patient's consent was obtained including but not limited to risks of crusting, scabbing,  blistering, scarring, darker or lighter pigmentary change, recurrence, incomplete removal and infection.    Skin Infection, NOS   - fissure on webspace    Plan: Counseling  I counseled the patient regarding the following:  Skin care: Patients with purulence or fluid collections should have their wounds re-opened, drained, cultured and irrigated. All wound infections should be treated with antibiotics.  Expectations: Wound Infections usually occur 4-7 days postoperatively. Patients exhibit, pain, rednes, swelling, cellulitic changes and fever.  Contact Office if: Wound Infection fails  to respond to treatment or worsens, patient develops a fever, or if redness spreads despite antibiotics.    A bacterial culture was obtained from the left foot    Tinea Pedis   - erythematous scaly plaques in moccasin-like distribution and maceration located on the 1st webspace left foot.    Plan: Counseling  I counseled the patient regarding the following:  Skin care: Most forms of Tinea Pedis can be treated with topical antifungal creams.  Expectations: Tinea Pedis is a dermatophyte infection of the feet. Risk factors include pets, humid or warm climates. Cure rates are excellent, but recurrence is high.  - Will refill ketoconazole cream and clobetasol cream     High Risk Medication Monitoring (Z79.899) : The risks and benefits of the medication were reviewed in full with the patient. Should any side effects occur, the patient will stop the medication and contact me immediately.  - Will check labs today   Soriatane Counseling: I discussed with the patient the risks of acitretin including but not limited to hair loss, dry  lips/skin/eyes, liver damage, hyperlipidemia, depression/suicidal ideation, photosensitivity. Serious rare side  effects can include but are not limited to pancreatitis, pseudotumor cerebri, bony changes, clot  formation/stroke/heart attack. Patient understands that alcohol is contraindicated since it can result in liver toxicity  and significantly prolong the elimination of the drug by many years. Patient understands that this drug is not  meant for any woman of childbearing age or who plans to get pregnant since it is teratogenic and has a long drug  elimination half life.        Follow up in about 3 months (around 10/22/2024).    Rebekah Garcia MD

## 2024-07-25 LAB — MICROORGANISM SPEC CULT: ABNORMAL

## 2024-07-26 DIAGNOSIS — B95.8 STAPH SKIN INFECTION: Primary | ICD-10-CM

## 2024-07-26 DIAGNOSIS — L08.9 STAPH SKIN INFECTION: Primary | ICD-10-CM

## 2024-07-26 RX ORDER — CEPHALEXIN 500 MG/1
500 CAPSULE ORAL 3 TIMES DAILY
Qty: 21 CAPSULE | Refills: 0 | Status: SHIPPED | OUTPATIENT
Start: 2024-07-26 | End: 2024-08-02

## 2024-08-01 ENCOUNTER — INFUSION (OUTPATIENT)
Dept: INFUSION THERAPY | Facility: HOSPITAL | Age: 79
End: 2024-08-01
Attending: FAMILY MEDICINE
Payer: MEDICARE

## 2024-08-01 VITALS
WEIGHT: 174 LBS | DIASTOLIC BLOOD PRESSURE: 69 MMHG | OXYGEN SATURATION: 98 % | BODY MASS INDEX: 35.14 KG/M2 | TEMPERATURE: 98 F | HEART RATE: 70 BPM | RESPIRATION RATE: 18 BRPM | SYSTOLIC BLOOD PRESSURE: 140 MMHG

## 2024-08-01 DIAGNOSIS — M81.0 AGE-RELATED OSTEOPOROSIS WITHOUT CURRENT PATHOLOGICAL FRACTURE: Primary | ICD-10-CM

## 2024-08-01 PROCEDURE — 96372 THER/PROPH/DIAG INJ SC/IM: CPT

## 2024-08-01 PROCEDURE — 63600175 PHARM REV CODE 636 W HCPCS: Mod: JZ,TB | Performed by: FAMILY MEDICINE

## 2024-08-01 RX ADMIN — DENOSUMAB 60 MG: 60 INJECTION SUBCUTANEOUS at 09:08

## 2024-08-01 NOTE — PROGRESS NOTES
0920  Patient in room 2.  Calcium level within last 30 days was 8.4.  Patient stated that she is taking her calcium with vitamin d daily.     0945 Administered Prolia 60mg SQ to right upper arm per protocol.  Patient tolerated well.     1005 No adverse reactions noted.  Patient discharged to home ambulatory with no adverse reactions noted at this time. Patient has appt to return in 6 months for next prolia injection.

## 2024-08-16 ENCOUNTER — OFFICE VISIT (OUTPATIENT)
Dept: FAMILY MEDICINE | Facility: CLINIC | Age: 79
End: 2024-08-16
Payer: MEDICARE

## 2024-08-16 VITALS
BODY MASS INDEX: 34.88 KG/M2 | DIASTOLIC BLOOD PRESSURE: 80 MMHG | TEMPERATURE: 97 F | HEIGHT: 59 IN | WEIGHT: 173 LBS | SYSTOLIC BLOOD PRESSURE: 119 MMHG | OXYGEN SATURATION: 99 % | RESPIRATION RATE: 18 BRPM | HEART RATE: 68 BPM

## 2024-08-16 DIAGNOSIS — K57.92 DIVERTICULITIS: Primary | ICD-10-CM

## 2024-08-16 PROCEDURE — 99213 OFFICE O/P EST LOW 20 MIN: CPT | Mod: ,,, | Performed by: FAMILY MEDICINE

## 2024-08-16 RX ORDER — CIPROFLOXACIN 500 MG/1
500 TABLET ORAL 2 TIMES DAILY
Qty: 14 TABLET | Refills: 0 | Status: SHIPPED | OUTPATIENT
Start: 2024-08-16

## 2024-08-16 NOTE — PROGRESS NOTES
Salty Barahona MD    [address] [place] [address]     PATIENT NAME: Dayanara Monae  : 1945  DATE: 24  MRN: [id number]      Billing Provider: Salty Barahona MD  Level of Service: MI OFFICE/OUTPT VISIT, EST, LEVL III, 20-29 MIN  Patient PCP Information       Provider PCP Type    Salty Barahona MD General            Reason for Visit / Chief Complaint: Abdominal Pain (Went to [facility] 2 weeks ago for abdominal pain. The ran tests on her and did not see anything. She was notified to follow up with PCP is she does not gets better. Had 4 BM today and denies any problems with it. 8/10 pain level. Requesting [facility] records. ), Emesis (On Monday she vomited and have not vomited since. ), Gait Problem (Have been having problems with her balance. Denies falling but has been staggering. ), Headache, and Did not bring medication for review       Update PCP  Update Chief Complaint         History of Present Illness / Problem Focused Workflow     Dayanara Monae presents to the clinic with Abdominal Pain (Went to [facility] 2 weeks ago for abdominal pain. The ran tests on her and did not see anything. She was notified to follow up with PCP is she does not gets better. Had 4 BM today and denies any problems with it. 8/10 pain level. Requesting [facility] records. ), Emesis (On Monday she vomited and have not vomited since. ), Gait Problem (Have been having problems with her balance. Denies falling but has been staggering. ), Headache, and Did not bring medication for review     She has been dealing with this pain off and on for the past 2 weeks. Today her symptoms flared up and seemed to be worse.     HPI    Review of Systems     Review of Systems   Constitutional:  Negative for activity change, appetite change, chills, fatigue and fever.   HENT:  Negative for nasal congestion, ear pain, hearing loss, postnasal drip and sore throat.    Respiratory:  Negative for cough, chest  tightness, shortness of breath and wheezing.    Cardiovascular:  Negative for chest pain, palpitations, leg swelling and claudication.   Gastrointestinal:  Negative for abdominal pain, change in bowel habit, constipation, diarrhea, nausea and vomiting.   Genitourinary:  Negative for dysuria.   Musculoskeletal:  Negative for arthralgias, back pain, gait problem and myalgias.   Integumentary:  Negative for rash.   Neurological:  Negative for weakness and headaches.   Psychiatric/Behavioral:  Negative for suicidal ideas. The patient is not nervous/anxious.         Medical / Social / Family History     Past Medical History:   Diagnosis Date    Actinic keratoses     Atrial fibrillation     CVA (cerebral vascular accident)     Epilepsy     Non-melanoma skin cancer     Osteoporosis     Skin cancer        Past Surgical History:   Procedure Laterality Date    APPENDECTOMY      carpel tunnel Right     CHOLECYSTECTOMY      EYE SURGERY      HAND SURGERY      HYSTERECTOMY      JOINT REPLACEMENT         Social History  Ms.  reports that she has never smoked. She has never been exposed to tobacco smoke. She has never used smokeless tobacco. She reports that she does not drink alcohol and does not use drugs.    Family History  Ms.'s family history includes Diabetes in her sister; Hypertension in her mother; Stroke in her father.    Medications and Allergies     Medications  Outpatient Medications Marked as Taking for the 8/16/24 encounter (Office Visit) with Salty Barahona MD   Medication Sig Dispense Refill    acitretin (SORIATANE) 25 MG capsule Take one 25mg capsule PO daily for Psoriasis 90 capsule 1    albuterol (VENTOLIN HFA) 90 mcg/actuation inhaler Inhale 2 puffs into the lungs every 6 (six) hours as needed for Wheezing. Or COUGH 18 g 5    aspirin (ECOTRIN) 81 MG EC tablet Take 81 mg by mouth once daily.      blood sugar diagnostic (FREESTYLE LITE STRIPS) Strp 1 each by Misc.(Non-Drug; Combo Route) route 2 (two) times  a day. 100 each 2    blood-glucose meter (BLOOD GLUCOSE MONITOR KIT MISC) 1 m by Misc.(Non-Drug; Combo Route) route.      calcium carb-mag hydrox-simeth 1,200 mg-270 mg -80 mg/10 mL Susp 1 tablet.      chlorhexidine (PERIDEX) 0.12 % solution Use as directed 15 mLs in the mouth or throat 2 (two) times a day.      citalopram (CELEXA) 20 MG tablet Take 1 tablet (20 mg total) by mouth once daily. For MOOD 90 tablet 1    clobetasoL (TEMOVATE) 0.05 % cream Apply to AA on body BID PRN flares tapering with improvement 60 g 6    clobetasoL (TEMOVATE) 0.05 % cream Apply to rash on feet nightly. 60 g 2    cyanocobalamin (VITAMIN B-12) 1000 MCG tablet Take 1,000 mcg by mouth.      dapagliflozin propanediol (FARXIGA) 5 mg Tab tablet Take 1 tablet (5 mg total) by mouth once daily. 30 tablet 2    donepeziL (ARICEPT) 5 MG tablet Take 1 tablet (5 mg total) by mouth once daily. For MEMORY 90 tablet 0    DULoxetine (CYMBALTA) 30 MG capsule TAKE 1 CAPSULE BY MOUTH ONCE DAILY FOR  JOINT  PAIN 90 capsule 0    fluticasone propionate (FLONASE) 50 mcg/actuation nasal spray 1 spray (50 mcg total) by Each Nostril route once daily. 16 g 5    hydroCHLOROthiazide (HYDRODIURIL) 25 MG tablet Take 1 tablet (25 mg total) by mouth once daily. For BLOOD PRESSURE 90 tablet 0    ketoconazole (NIZORAL) 2 % cream Apply topically once daily. 60 g 5    ketoconazole (NIZORAL) 2 % cream Apply to rash on feet daily 60 g 2    lancets 33 gauge Misc 1 lancet by Misc.(Non-Drug; Combo Route) route once daily. 100 each 1    meloxicam (MOBIC) 15 MG tablet Take 1 tablet (15 mg total) by mouth daily as needed for Pain. And INFLAMMATION 90 tablet 0    metFORMIN (GLUCOPHAGE-XR) 500 MG ER 24hr tablet Take 1 tablet (500 mg total) by mouth 2 (two) times daily with meals. For diabetes 180 tablet 1    metoprolol tartrate (LOPRESSOR) 25 MG tablet Take 1 tablet (25 mg total) by mouth 2 (two) times daily. For HEART 180 tablet 1    mupirocin (BACTROBAN) 2 % ointment Apply to  affected areas three times daily 30 g 2    naproxen sodium (ANAPROX) 550 MG tablet Take 1 tablet (550 mg total) by mouth 2 (two) times daily with meals. For PAIN and INFLAMMATION 180 tablet 1    omeprazole (PRILOSEC) 40 MG capsule Take 1 capsule (40 mg total) by mouth once daily. For STOMACH 90 capsule 1    polyethylene glycol (GLYCOLAX) 17 gram PwPk Take 17 g by mouth 2 (two) times daily as needed for Constipation. 30 packet 2    potassium chloride (K-TAB) 20 mEq Take 1 tablet (20 mEq total) by mouth once daily. 90 tablet 1    rosuvastatin (CRESTOR) 20 MG tablet Take 1 tablet (20 mg total) by mouth every evening. For CHOLESTEROL 90 tablet 0    travoprost (TRAVATAN Z) 0.004 % ophthalmic solution Place 1 drop into both eyes every evening.         Allergies  Review of patient's allergies indicates:   Allergen Reactions    Vaseline carbolated petroleum Blisters    Adhesive tape-silicones     Bactrim [sulfamethoxazole-trimethoprim]        Physical Examination     Vitals:    08/16/24 1504   BP: 119/80   Pulse: 68   Resp: 18   Temp: 97.4 °F (36.3 °C)     Physical Exam  Constitutional:       Appearance: Normal appearance.   HENT:      Head: Normocephalic and atraumatic.   Eyes:      Extraocular Movements: Extraocular movements intact.   Cardiovascular:      Rate and Rhythm: Normal rate and regular rhythm.   Pulmonary:      Effort: Pulmonary effort is normal.      Breath sounds: Normal breath sounds.   Abdominal:      Tenderness: There is generalized abdominal tenderness.       Skin:     General: Skin is warm.   Neurological:      Mental Status: She is alert and oriented to person, place, and time. Mental status is at baseline.            Assessment and Plan (including Health Maintenance)      Problem List  Smart Sets  Document Outside HM   :    Plan:     Trial of ciprofloxacin for her stomach    Health Maintenance Due   Topic Date Due    Shingles Vaccine (1 of 2) Never done    RSV Vaccine (Age 60+ and Pregnant patients) (1  - 1-dose 60+ series) Never done    COVID-19 Vaccine (4 - 2023-24 season) 09/01/2023    Diabetes Urine Screening  09/19/2023    Eye Exam  06/19/2024    Hemoglobin A1c  08/02/2024       Problem List Items Addressed This Visit          GI    Diverticulitis - Primary    Relevant Medications    ciprofloxacin HCl (CIPRO) 500 MG tablet       Health Maintenance Topics with due status: Not Due       Topic Last Completion Date    TETANUS VACCINE 05/19/2015    Influenza Vaccine 09/25/2023    DEXA Scan 05/07/2024    Lipid Panel 07/23/2024       Procedures     Future Appointments   Date Time Provider Department Center   10/22/2024  1:15 PM Rebekah Garcia MD [facility] DERM [place]   2/5/2025  9:00 AM INFUSION, [facility] [place]        Follow up if symptoms worsen or fail to improve.     Signature:  Salty Barahona MD  [address] [place] [address]  Phone #: [phone]  Fax #: [phone]    Date of encounter: 8/16/24    There are no Patient Instructions on file for this visit.

## 2024-08-19 ENCOUNTER — TELEPHONE (OUTPATIENT)
Dept: FAMILY MEDICINE | Facility: CLINIC | Age: 79
End: 2024-08-19
Payer: MEDICARE

## 2024-08-19 NOTE — TELEPHONE ENCOUNTER
Pt came into clinic today to update us on how her stomach issues were going. She states that the medication we prescribed is helping her with the pain. She has been having several BM's and it feeling better. She does state that she feels bloated and when she sits down she feels a lot of pressure in her lower abdomen like something is pushing up. I told her I would let you know and to keep us updated if anything changed. Verbalized understanding.

## 2024-10-22 ENCOUNTER — OFFICE VISIT (OUTPATIENT)
Dept: DERMATOLOGY | Facility: CLINIC | Age: 79
End: 2024-10-22
Payer: MEDICARE

## 2024-10-22 VITALS — WEIGHT: 173.06 LBS | RESPIRATION RATE: 18 BRPM | HEIGHT: 59 IN | BODY MASS INDEX: 34.89 KG/M2

## 2024-10-22 DIAGNOSIS — L40.0 PLAQUE PSORIASIS: Chronic | ICD-10-CM

## 2024-10-22 DIAGNOSIS — L40.9 PSORIASIS: Primary | ICD-10-CM

## 2024-10-22 DIAGNOSIS — Z79.899 HIGH RISK MEDICATION USE: ICD-10-CM

## 2024-10-22 RX ORDER — ACITRETIN 25 MG/1
CAPSULE ORAL
Qty: 90 CAPSULE | Refills: 1 | Status: SHIPPED | OUTPATIENT
Start: 2024-10-22

## 2024-10-22 NOTE — PROGRESS NOTES
[facility]   Rebekah Garcia MD    Patient Name: Dayanara Monae  Patient YOB: 1945   Date of Service: 10/22/24    CC: Follow-up Psoriasis    HPI: Dayanara Monae is a 78 y.o. female here today for follow-up of psoriasis, last seen 7/26/2024.  Previous treatments include Acitretin, clobetasol cream and ketoconazole cream.  Overall, the psoriasis is improved.  Treatment plan was followed as directed.    Past Medical History:   Diagnosis Date    Actinic keratoses     Atrial fibrillation     CVA (cerebral vascular accident)     Epilepsy     Non-melanoma skin cancer     Osteoporosis     Skin cancer      Past Surgical History:   Procedure Laterality Date    APPENDECTOMY      carpel tunnel Right     CHOLECYSTECTOMY      EYE SURGERY      HAND SURGERY      HYSTERECTOMY      JOINT REPLACEMENT       Review of patient's allergies indicates:   Allergen Reactions    Vaseline carbolated petroleum Blisters    Adhesive tape-silicones     Bactrim [sulfamethoxazole-trimethoprim]        Current Outpatient Medications:     acitretin (SORIATANE) 25 MG capsule, Take one 25mg capsule PO daily for Psoriasis, Disp: 90 capsule, Rfl: 1    albuterol (VENTOLIN HFA) 90 mcg/actuation inhaler, Inhale 2 puffs into the lungs every 6 (six) hours as needed for Wheezing. Or COUGH, Disp: 18 g, Rfl: 5    aspirin (ECOTRIN) 81 MG EC tablet, Take 81 mg by mouth once daily., Disp: , Rfl:     blood sugar diagnostic (FREESTYLE LITE STRIPS) Strp, 1 each by Misc.(Non-Drug; Combo Route) route 2 (two) times a day., Disp: 100 each, Rfl: 2    blood-glucose meter (BLOOD GLUCOSE MONITOR KIT MISC), 1 m by Misc.(Non-Drug; Combo Route) route., Disp: , Rfl:     calcium carb-mag hydrox-simeth 1,200 mg-270 mg -80 mg/10 mL Susp, 1 tablet., Disp: , Rfl:     chlorhexidine (PERIDEX) 0.12 % solution, Use as directed 15 mLs in the mouth or throat 2 (two) times a day., Disp: , Rfl:     ciprofloxacin HCl (CIPRO) 500 MG tablet, Take 1 tablet (500 mg total)  by mouth 2 (two) times daily., Disp: 14 tablet, Rfl: 0    citalopram (CELEXA) 20 MG tablet, Take 1 tablet (20 mg total) by mouth once daily. For MOOD, Disp: 90 tablet, Rfl: 1    clobetasoL (TEMOVATE) 0.05 % cream, Apply to AA on body BID PRN flares tapering with improvement, Disp: 60 g, Rfl: 6    clobetasoL (TEMOVATE) 0.05 % cream, Apply to rash on feet nightly., Disp: 60 g, Rfl: 2    cyanocobalamin (VITAMIN B-12) 1000 MCG tablet, Take 1,000 mcg by mouth., Disp: , Rfl:     dapagliflozin propanediol (FARXIGA) 5 mg Tab tablet, Take 1 tablet (5 mg total) by mouth once daily., Disp: 30 tablet, Rfl: 2    donepeziL (ARICEPT) 5 MG tablet, Take 1 tablet (5 mg total) by mouth once daily. For MEMORY, Disp: 90 tablet, Rfl: 0    DULoxetine (CYMBALTA) 30 MG capsule, TAKE 1 CAPSULE BY MOUTH ONCE DAILY FOR  JOINT  PAIN, Disp: 90 capsule, Rfl: 0    fluticasone propionate (FLONASE) 50 mcg/actuation nasal spray, 1 spray (50 mcg total) by Each Nostril route once daily., Disp: 16 g, Rfl: 5    hydroCHLOROthiazide (HYDRODIURIL) 25 MG tablet, Take 1 tablet (25 mg total) by mouth once daily. For BLOOD PRESSURE, Disp: 90 tablet, Rfl: 0    ketoconazole (NIZORAL) 2 % cream, Apply topically once daily., Disp: 60 g, Rfl: 5    ketoconazole (NIZORAL) 2 % cream, Apply to rash on feet daily, Disp: 60 g, Rfl: 2    lancets 33 gauge Misc, 1 lancet by Misc.(Non-Drug; Combo Route) route once daily., Disp: 100 each, Rfl: 1    meloxicam (MOBIC) 15 MG tablet, Take 1 tablet (15 mg total) by mouth daily as needed for Pain. And INFLAMMATION, Disp: 90 tablet, Rfl: 0    metFORMIN (GLUCOPHAGE-XR) 500 MG ER 24hr tablet, Take 1 tablet (500 mg total) by mouth 2 (two) times daily with meals. For diabetes, Disp: 180 tablet, Rfl: 1    metoprolol tartrate (LOPRESSOR) 25 MG tablet, Take 1 tablet (25 mg total) by mouth 2 (two) times daily. For HEART, Disp: 180 tablet, Rfl: 1    mupirocin (BACTROBAN) 2 % ointment, Apply to affected areas three times daily, Disp: 30 g,  Rfl: 2    naproxen sodium (ANAPROX) 550 MG tablet, Take 1 tablet (550 mg total) by mouth 2 (two) times daily with meals. For PAIN and INFLAMMATION, Disp: 180 tablet, Rfl: 1    omeprazole (PRILOSEC) 40 MG capsule, Take 1 capsule (40 mg total) by mouth once daily. For STOMACH, Disp: 90 capsule, Rfl: 1    polyethylene glycol (GLYCOLAX) 17 gram PwPk, Take 17 g by mouth 2 (two) times daily as needed for Constipation., Disp: 30 packet, Rfl: 2    potassium chloride (K-TAB) 20 mEq, Take 1 tablet (20 mEq total) by mouth once daily., Disp: 90 tablet, Rfl: 1    rosuvastatin (CRESTOR) 20 MG tablet, Take 1 tablet (20 mg total) by mouth every evening. For CHOLESTEROL, Disp: 90 tablet, Rfl: 0    travoprost (TRAVATAN Z) 0.004 % ophthalmic solution, Place 1 drop into both eyes every evening., Disp: , Rfl:     ROS: A focused review of systems was obtained and negative.     Exam: A focused skin exam was performed. All areas examined were normal except as mentioned in the assessment and plan below.  General Appearance of the patient is well developed and well nourished.  Orientation: alert and oriented x 3.  Mood and affect: pleasant.    Assessment:   The primary encounter diagnosis was Psoriasis. Diagnoses of High risk medication use and Plaque psoriasis were also pertinent to this visit.    Plan:   Medications Ordered This Encounter   Medications    acitretin (SORIATANE) 25 MG capsule     Sig: Take one 25mg capsule PO daily for Psoriasis     Dispense:  90 capsule     Refill:  1     Plaque Psoriasis  - clear  Status: Well Controlled    Plan: Counseling  I counseled the patient regarding the following:  Skin care: Emollients, ambient sun exposure, shampoos with tar, selenium or zinc pyrithione can improve psoriasis.  Expectations: Psoriasis is chronic in nature with periods of remissions and flares. Flares can be triggered by stress, infections (group A strep), certain medications and alcohol.  Contact office if: Psoriasis worsens, or  fails to improve despite several months of treatment.  - Continue Acitretin, clobetasol cream, and ketoconazole crea    High Risk Medication Monitoring (Z79.899) : The risks and benefits of the medication were reviewed in full with the patient. Should any side effects occur, the patient will stop the medication and contact me immediately.    Soriatane Counseling: I discussed with the patient the risks of acitretin including but not limited to hair loss, dry  lips/skin/eyes, liver damage, hyperlipidemia, depression/suicidal ideation, photosensitivity. Serious rare side  effects can include but are not limited to pancreatitis, pseudotumor cerebri, bony changes, clot  formation/stroke/heart attack. Patient understands that alcohol is contraindicated since it can result in liver toxicity  and significantly prolong the elimination of the drug by many years. Patient understands that this drug is not  meant for any woman of childbearing age or who plans to get pregnant since it is teratogenic and has a long drug  elimination half life.    - recent labs reviewed  - asked pt to follow up with her PCP Dr. Barahona at the first of the year for repeat labs     Follow up in about 6 months (around 4/22/2025).    Rebekah Garcia MD

## 2024-10-28 DIAGNOSIS — K21.9 GASTROESOPHAGEAL REFLUX DISEASE, UNSPECIFIED WHETHER ESOPHAGITIS PRESENT: Chronic | ICD-10-CM

## 2024-10-28 DIAGNOSIS — E78.2 MIXED HYPERLIPIDEMIA: Chronic | ICD-10-CM

## 2024-10-28 DIAGNOSIS — E11.9 TYPE 2 DIABETES MELLITUS WITHOUT COMPLICATION, WITHOUT LONG-TERM CURRENT USE OF INSULIN: Chronic | ICD-10-CM

## 2024-10-28 DIAGNOSIS — F33.41 RECURRENT MAJOR DEPRESSIVE DISORDER, IN PARTIAL REMISSION: Chronic | ICD-10-CM

## 2024-10-28 DIAGNOSIS — I10 ESSENTIAL HYPERTENSION: Chronic | ICD-10-CM

## 2024-10-28 DIAGNOSIS — E87.6 LOW BLOOD POTASSIUM: ICD-10-CM

## 2024-10-28 DIAGNOSIS — B35.3 TINEA PEDIS OF BOTH FEET: ICD-10-CM

## 2024-10-28 DIAGNOSIS — M54.2 NECK PAIN: Chronic | ICD-10-CM

## 2024-10-28 DIAGNOSIS — K57.92 DIVERTICULITIS: ICD-10-CM

## 2024-10-28 DIAGNOSIS — L40.0 PLAQUE PSORIASIS: Chronic | ICD-10-CM

## 2024-10-28 DIAGNOSIS — G56.03 BILATERAL CARPAL TUNNEL SYNDROME: Chronic | ICD-10-CM

## 2024-10-28 RX ORDER — POTASSIUM CHLORIDE 1500 MG/1
20 TABLET, EXTENDED RELEASE ORAL DAILY
Qty: 90 TABLET | Refills: 0 | Status: SHIPPED | OUTPATIENT
Start: 2024-10-28

## 2024-10-28 RX ORDER — NAPROXEN SODIUM 550 MG/1
550 TABLET ORAL 2 TIMES DAILY WITH MEALS
Qty: 180 TABLET | Refills: 0 | Status: SHIPPED | OUTPATIENT
Start: 2024-10-28

## 2024-10-28 RX ORDER — MELOXICAM 15 MG/1
15 TABLET ORAL DAILY PRN
Qty: 90 TABLET | Refills: 0 | Status: SHIPPED | OUTPATIENT
Start: 2024-10-28

## 2024-10-28 RX ORDER — DULOXETIN HYDROCHLORIDE 30 MG/1
CAPSULE, DELAYED RELEASE ORAL
Qty: 90 CAPSULE | Refills: 0 | Status: SHIPPED | OUTPATIENT
Start: 2024-10-28

## 2024-10-28 RX ORDER — OMEPRAZOLE 40 MG/1
40 CAPSULE, DELAYED RELEASE ORAL DAILY
Qty: 90 CAPSULE | Refills: 0 | Status: SHIPPED | OUTPATIENT
Start: 2024-10-28

## 2024-10-28 RX ORDER — CITALOPRAM 20 MG/1
20 TABLET, FILM COATED ORAL DAILY
Qty: 90 TABLET | Refills: 0 | Status: SHIPPED | OUTPATIENT
Start: 2024-10-28

## 2024-10-28 RX ORDER — HYDROCHLOROTHIAZIDE 25 MG/1
25 TABLET ORAL DAILY
Qty: 90 TABLET | Refills: 0 | Status: SHIPPED | OUTPATIENT
Start: 2024-10-28

## 2024-10-28 RX ORDER — METOPROLOL TARTRATE 25 MG/1
25 TABLET, FILM COATED ORAL 2 TIMES DAILY
Qty: 180 TABLET | Refills: 0 | Status: SHIPPED | OUTPATIENT
Start: 2024-10-28

## 2024-10-28 RX ORDER — CIPROFLOXACIN 500 MG/1
500 TABLET ORAL 2 TIMES DAILY
Qty: 14 TABLET | Refills: 0 | Status: CANCELLED | OUTPATIENT
Start: 2024-10-28

## 2024-10-28 RX ORDER — ROSUVASTATIN CALCIUM 20 MG/1
20 TABLET, COATED ORAL NIGHTLY
Qty: 90 TABLET | Refills: 0 | Status: SHIPPED | OUTPATIENT
Start: 2024-10-28

## 2024-10-28 RX ORDER — METFORMIN HYDROCHLORIDE 500 MG/1
500 TABLET, EXTENDED RELEASE ORAL 2 TIMES DAILY WITH MEALS
Qty: 180 TABLET | Refills: 0 | Status: SHIPPED | OUTPATIENT
Start: 2024-10-28

## 2024-10-29 RX ORDER — ACITRETIN 25 MG/1
CAPSULE ORAL
Qty: 90 CAPSULE | Refills: 1 | Status: SHIPPED | OUTPATIENT
Start: 2024-10-29

## 2024-10-29 RX ORDER — CLOBETASOL PROPIONATE 0.5 MG/G
CREAM TOPICAL
Qty: 60 G | Refills: 2 | Status: SHIPPED | OUTPATIENT
Start: 2024-10-29

## 2024-10-30 ENCOUNTER — OFFICE VISIT (OUTPATIENT)
Dept: FAMILY MEDICINE | Facility: CLINIC | Age: 79
End: 2024-10-30
Payer: MEDICARE

## 2024-10-30 VITALS
DIASTOLIC BLOOD PRESSURE: 70 MMHG | HEIGHT: 59 IN | TEMPERATURE: 99 F | RESPIRATION RATE: 18 BRPM | OXYGEN SATURATION: 96 % | WEIGHT: 173 LBS | BODY MASS INDEX: 34.88 KG/M2 | SYSTOLIC BLOOD PRESSURE: 101 MMHG | HEART RATE: 108 BPM

## 2024-10-30 DIAGNOSIS — G44.209 ACUTE NON INTRACTABLE TENSION-TYPE HEADACHE: ICD-10-CM

## 2024-10-30 DIAGNOSIS — R52 GENERALIZED BODY ACHES: ICD-10-CM

## 2024-10-30 DIAGNOSIS — Z20.822 ENCOUNTER FOR LABORATORY TESTING FOR COVID-19 VIRUS: ICD-10-CM

## 2024-10-30 DIAGNOSIS — M26.621 TMJ TENDERNESS, RIGHT: ICD-10-CM

## 2024-10-30 DIAGNOSIS — F41.9 ANXIETY: Primary | ICD-10-CM

## 2024-10-30 LAB
CTP QC/QA: YES
CTP QC/QA: YES
POC MOLECULAR INFLUENZA A AGN: NEGATIVE
POC MOLECULAR INFLUENZA B AGN: NEGATIVE
SARS-COV-2 RDRP RESP QL NAA+PROBE: NEGATIVE

## 2024-10-30 PROCEDURE — 96372 THER/PROPH/DIAG INJ SC/IM: CPT | Mod: ,,, | Performed by: NURSE PRACTITIONER

## 2024-10-30 PROCEDURE — 99214 OFFICE O/P EST MOD 30 MIN: CPT | Mod: ,,, | Performed by: NURSE PRACTITIONER

## 2024-10-30 PROCEDURE — 87635 SARS-COV-2 COVID-19 AMP PRB: CPT | Mod: RHCUB | Performed by: NURSE PRACTITIONER

## 2024-10-30 PROCEDURE — 87502 INFLUENZA DNA AMP PROBE: CPT | Mod: RHCUB | Performed by: NURSE PRACTITIONER

## 2024-10-30 RX ORDER — LATANOPROST 50 UG/ML
1 SOLUTION/ DROPS OPHTHALMIC NIGHTLY
COMMUNITY
Start: 2024-10-20

## 2024-10-30 RX ORDER — KETOROLAC TROMETHAMINE 30 MG/ML
30 INJECTION, SOLUTION INTRAMUSCULAR; INTRAVENOUS
Status: COMPLETED | OUTPATIENT
Start: 2024-10-30 | End: 2024-10-30

## 2024-10-30 RX ADMIN — KETOROLAC TROMETHAMINE 30 MG: 30 INJECTION, SOLUTION INTRAMUSCULAR; INTRAVENOUS at 09:10

## 2025-01-09 DIAGNOSIS — M81.0 OSTEOPOROSIS, UNSPECIFIED OSTEOPOROSIS TYPE, UNSPECIFIED PATHOLOGICAL FRACTURE PRESENCE: Primary | ICD-10-CM

## 2025-02-04 DIAGNOSIS — E87.6 LOW BLOOD POTASSIUM: ICD-10-CM

## 2025-02-04 DIAGNOSIS — M54.2 NECK PAIN: Chronic | ICD-10-CM

## 2025-02-04 DIAGNOSIS — I10 ESSENTIAL HYPERTENSION: Chronic | ICD-10-CM

## 2025-02-04 DIAGNOSIS — F33.41 RECURRENT MAJOR DEPRESSIVE DISORDER, IN PARTIAL REMISSION: Chronic | ICD-10-CM

## 2025-02-04 RX ORDER — DULOXETIN HYDROCHLORIDE 30 MG/1
CAPSULE, DELAYED RELEASE ORAL
Qty: 30 CAPSULE | Refills: 0 | Status: SHIPPED | OUTPATIENT
Start: 2025-02-04 | End: 2025-02-18 | Stop reason: SDUPTHER

## 2025-02-04 RX ORDER — CITALOPRAM 20 MG/1
20 TABLET, FILM COATED ORAL DAILY
Qty: 30 TABLET | Refills: 0 | Status: SHIPPED | OUTPATIENT
Start: 2025-02-04

## 2025-02-04 RX ORDER — HYDROCHLOROTHIAZIDE 25 MG/1
25 TABLET ORAL DAILY
Qty: 30 TABLET | Refills: 0 | Status: SHIPPED | OUTPATIENT
Start: 2025-02-04

## 2025-02-04 RX ORDER — POTASSIUM CHLORIDE 1500 MG/1
20 TABLET, EXTENDED RELEASE ORAL DAILY
Qty: 90 TABLET | Refills: 0 | Status: SHIPPED | OUTPATIENT
Start: 2025-02-04

## 2025-02-05 ENCOUNTER — INFUSION (OUTPATIENT)
Dept: INFUSION THERAPY | Facility: HOSPITAL | Age: 80
End: 2025-02-05
Attending: FAMILY MEDICINE
Payer: MEDICARE

## 2025-02-05 DIAGNOSIS — M81.0 AGE-RELATED OSTEOPOROSIS WITHOUT CURRENT PATHOLOGICAL FRACTURE: Primary | ICD-10-CM

## 2025-02-05 LAB — CALCIUM SERPL-MCNC: 9.7 MG/DL (ref 8.4–10.2)

## 2025-02-05 PROCEDURE — 63600175 PHARM REV CODE 636 W HCPCS: Mod: JZ,TB | Performed by: FAMILY MEDICINE

## 2025-02-05 PROCEDURE — 36415 COLL VENOUS BLD VENIPUNCTURE: CPT | Performed by: FAMILY MEDICINE

## 2025-02-05 PROCEDURE — 96372 THER/PROPH/DIAG INJ SC/IM: CPT

## 2025-02-05 RX ADMIN — DENOSUMAB 60 MG: 60 INJECTION SUBCUTANEOUS at 08:02

## 2025-02-05 NOTE — PROGRESS NOTES
0830 Patient in room 2.  Calcium level today was 9.7    0855 Administered Prolia 60mg SQ to left upper arm per protocol. Patient tolerated well.     0915 Patient discharged to home ambulatory with appt in hand to return in 6 months for next prolia injection.

## 2025-02-18 DIAGNOSIS — E11.9 TYPE 2 DIABETES MELLITUS WITHOUT COMPLICATION, WITHOUT LONG-TERM CURRENT USE OF INSULIN: Chronic | ICD-10-CM

## 2025-02-18 DIAGNOSIS — E78.2 MIXED HYPERLIPIDEMIA: Chronic | ICD-10-CM

## 2025-02-18 DIAGNOSIS — F03.918 DEMENTIA WITH BEHAVIORAL DISTURBANCE: Chronic | ICD-10-CM

## 2025-02-18 DIAGNOSIS — M54.2 NECK PAIN: Chronic | ICD-10-CM

## 2025-02-20 RX ORDER — METFORMIN HYDROCHLORIDE 500 MG/1
500 TABLET, EXTENDED RELEASE ORAL 2 TIMES DAILY WITH MEALS
Qty: 180 TABLET | Refills: 0 | Status: SHIPPED | OUTPATIENT
Start: 2025-02-20

## 2025-02-20 RX ORDER — MELOXICAM 15 MG/1
15 TABLET ORAL DAILY PRN
Qty: 90 TABLET | Refills: 0 | Status: SHIPPED | OUTPATIENT
Start: 2025-02-20

## 2025-02-20 RX ORDER — ROSUVASTATIN CALCIUM 20 MG/1
20 TABLET, COATED ORAL NIGHTLY
Qty: 90 TABLET | Refills: 0 | Status: SHIPPED | OUTPATIENT
Start: 2025-02-20

## 2025-02-20 RX ORDER — DONEPEZIL HYDROCHLORIDE 5 MG/1
5 TABLET, FILM COATED ORAL DAILY
Qty: 90 TABLET | Refills: 0 | Status: SHIPPED | OUTPATIENT
Start: 2025-02-20

## 2025-02-20 RX ORDER — DULOXETIN HYDROCHLORIDE 30 MG/1
CAPSULE, DELAYED RELEASE ORAL
Qty: 90 CAPSULE | Refills: 0 | Status: SHIPPED | OUTPATIENT
Start: 2025-02-20

## 2025-03-18 ENCOUNTER — OFFICE VISIT (OUTPATIENT)
Dept: FAMILY MEDICINE | Facility: CLINIC | Age: 80
End: 2025-03-18
Payer: MEDICARE

## 2025-03-18 VITALS
RESPIRATION RATE: 16 BRPM | SYSTOLIC BLOOD PRESSURE: 132 MMHG | HEIGHT: 59 IN | OXYGEN SATURATION: 98 % | DIASTOLIC BLOOD PRESSURE: 69 MMHG | BODY MASS INDEX: 37.02 KG/M2 | HEART RATE: 87 BPM | WEIGHT: 183.63 LBS | TEMPERATURE: 98 F

## 2025-03-18 DIAGNOSIS — E66.01 CLASS 2 SEVERE OBESITY DUE TO EXCESS CALORIES WITH SERIOUS COMORBIDITY AND BODY MASS INDEX (BMI) OF 36.0 TO 36.9 IN ADULT: Chronic | ICD-10-CM

## 2025-03-18 DIAGNOSIS — E87.6 LOW BLOOD POTASSIUM: Chronic | ICD-10-CM

## 2025-03-18 DIAGNOSIS — M54.2 NECK PAIN: Chronic | ICD-10-CM

## 2025-03-18 DIAGNOSIS — G56.03 BILATERAL CARPAL TUNNEL SYNDROME: Chronic | ICD-10-CM

## 2025-03-18 DIAGNOSIS — E11.9 TYPE 2 DIABETES MELLITUS WITHOUT COMPLICATION, WITHOUT LONG-TERM CURRENT USE OF INSULIN: Primary | Chronic | ICD-10-CM

## 2025-03-18 DIAGNOSIS — I10 ESSENTIAL HYPERTENSION: Chronic | ICD-10-CM

## 2025-03-18 DIAGNOSIS — K21.9 GASTROESOPHAGEAL REFLUX DISEASE, UNSPECIFIED WHETHER ESOPHAGITIS PRESENT: Chronic | ICD-10-CM

## 2025-03-18 DIAGNOSIS — F33.41 RECURRENT MAJOR DEPRESSIVE DISORDER, IN PARTIAL REMISSION: Chronic | ICD-10-CM

## 2025-03-18 DIAGNOSIS — E66.812 CLASS 2 SEVERE OBESITY DUE TO EXCESS CALORIES WITH SERIOUS COMORBIDITY AND BODY MASS INDEX (BMI) OF 36.0 TO 36.9 IN ADULT: Chronic | ICD-10-CM

## 2025-03-18 DIAGNOSIS — F03.918 DEMENTIA WITH BEHAVIORAL DISTURBANCE: Chronic | ICD-10-CM

## 2025-03-18 DIAGNOSIS — E78.2 MIXED HYPERLIPIDEMIA: Chronic | ICD-10-CM

## 2025-03-18 DIAGNOSIS — K21.9 GASTROESOPHAGEAL REFLUX DISEASE WITHOUT ESOPHAGITIS: Chronic | ICD-10-CM

## 2025-03-18 LAB
ANION GAP SERPL CALCULATED.3IONS-SCNC: 14 MMOL/L (ref 7–16)
BASOPHILS # BLD AUTO: 0.03 K/UL (ref 0–0.2)
BASOPHILS NFR BLD AUTO: 0.4 % (ref 0–1)
BILIRUB UR QL STRIP: NEGATIVE
BUN SERPL-MCNC: 22 MG/DL (ref 10–20)
BUN/CREAT SERPL: 27 (ref 6–20)
CALCIUM SERPL-MCNC: 9.6 MG/DL (ref 8.4–10.2)
CHLORIDE SERPL-SCNC: 103 MMOL/L (ref 98–107)
CHOLEST SERPL-MCNC: 117 MG/DL
CHOLEST/HDLC SERPL: 2.7 {RATIO}
CLARITY UR: CLEAR
CO2 SERPL-SCNC: 26 MMOL/L (ref 23–31)
COLOR UR: COLORLESS
CREAT SERPL-MCNC: 0.83 MG/DL (ref 0.55–1.02)
CREAT UR-MCNC: 30 MG/DL (ref 15–325)
DIFFERENTIAL METHOD BLD: ABNORMAL
EGFR (NO RACE VARIABLE) (RUSH/TITUS): 73 ML/MIN/1.73M2
EOSINOPHIL # BLD AUTO: 0.1 K/UL (ref 0–0.5)
EOSINOPHIL NFR BLD AUTO: 1.3 % (ref 1–4)
ERYTHROCYTE [DISTWIDTH] IN BLOOD BY AUTOMATED COUNT: 13.9 % (ref 11.5–14.5)
EST. AVERAGE GLUCOSE BLD GHB EST-MCNC: 148 MG/DL
GLUCOSE SERPL-MCNC: 139 MG/DL (ref 82–115)
GLUCOSE UR STRIP-MCNC: NORMAL MG/DL
HBA1C MFR BLD HPLC: 6.8 %
HCT VFR BLD AUTO: 39.9 % (ref 38–47)
HDLC SERPL-MCNC: 43 MG/DL (ref 35–60)
HGB BLD-MCNC: 12.1 G/DL (ref 12–16)
IMM GRANULOCYTES # BLD AUTO: 0.05 K/UL (ref 0–0.04)
IMM GRANULOCYTES NFR BLD: 0.7 % (ref 0–0.4)
KETONES UR STRIP-SCNC: NEGATIVE MG/DL
LDLC SERPL CALC-MCNC: 50 MG/DL
LDLC/HDLC SERPL: 1.2 {RATIO}
LEUKOCYTE ESTERASE UR QL STRIP: NEGATIVE
LYMPHOCYTES # BLD AUTO: 1.48 K/UL (ref 1–4.8)
LYMPHOCYTES NFR BLD AUTO: 19.8 % (ref 27–41)
MCH RBC QN AUTO: 26.2 PG (ref 27–31)
MCHC RBC AUTO-ENTMCNC: 30.3 G/DL (ref 32–36)
MCV RBC AUTO: 86.6 FL (ref 80–96)
MICROALBUMIN UR-MCNC: <0.5 MG/DL
MICROALBUMIN/CREAT RATIO PNL UR: NORMAL
MONOCYTES # BLD AUTO: 0.64 K/UL (ref 0–0.8)
MONOCYTES NFR BLD AUTO: 8.5 % (ref 2–6)
MPC BLD CALC-MCNC: 10.6 FL (ref 9.4–12.4)
NEUTROPHILS # BLD AUTO: 5.19 K/UL (ref 1.8–7.7)
NEUTROPHILS NFR BLD AUTO: 69.3 % (ref 53–65)
NITRITE UR QL STRIP: NEGATIVE
NONHDLC SERPL-MCNC: 74 MG/DL
NRBC # BLD AUTO: 0 X10E3/UL
NRBC, AUTO (.00): 0 %
PH UR STRIP: 6 PH UNITS
PLATELET # BLD AUTO: 375 K/UL (ref 150–400)
POTASSIUM SERPL-SCNC: 4.6 MMOL/L (ref 3.5–5.1)
PROT UR QL STRIP: NEGATIVE
RBC # BLD AUTO: 4.61 M/UL (ref 4.2–5.4)
RBC # UR STRIP: NEGATIVE /UL
SODIUM SERPL-SCNC: 138 MMOL/L (ref 136–145)
SP GR UR STRIP: 1.01
TRIGL SERPL-MCNC: 118 MG/DL (ref 37–140)
UROBILINOGEN UR STRIP-ACNC: NORMAL MG/DL
VLDLC SERPL-MCNC: 24 MG/DL
WBC # BLD AUTO: 7.49 K/UL (ref 4.5–11)

## 2025-03-18 PROCEDURE — 82570 ASSAY OF URINE CREATININE: CPT | Mod: ,,, | Performed by: CLINICAL MEDICAL LABORATORY

## 2025-03-18 PROCEDURE — 80061 LIPID PANEL: CPT | Mod: ,,, | Performed by: CLINICAL MEDICAL LABORATORY

## 2025-03-18 PROCEDURE — 99214 OFFICE O/P EST MOD 30 MIN: CPT | Mod: ,,, | Performed by: FAMILY MEDICINE

## 2025-03-18 PROCEDURE — 81003 URINALYSIS AUTO W/O SCOPE: CPT | Mod: QW,,, | Performed by: CLINICAL MEDICAL LABORATORY

## 2025-03-18 PROCEDURE — 80048 BASIC METABOLIC PNL TOTAL CA: CPT | Mod: ,,, | Performed by: CLINICAL MEDICAL LABORATORY

## 2025-03-18 PROCEDURE — 83036 HEMOGLOBIN GLYCOSYLATED A1C: CPT | Mod: ,,, | Performed by: CLINICAL MEDICAL LABORATORY

## 2025-03-18 PROCEDURE — 85025 COMPLETE CBC W/AUTO DIFF WBC: CPT | Mod: ,,, | Performed by: CLINICAL MEDICAL LABORATORY

## 2025-03-18 PROCEDURE — 82043 UR ALBUMIN QUANTITATIVE: CPT | Mod: ,,, | Performed by: CLINICAL MEDICAL LABORATORY

## 2025-03-18 RX ORDER — MELOXICAM 15 MG/1
15 TABLET ORAL DAILY PRN
Qty: 90 TABLET | Refills: 1 | Status: SHIPPED | OUTPATIENT
Start: 2025-03-18

## 2025-03-18 RX ORDER — POTASSIUM CHLORIDE 1500 MG/1
20 TABLET, EXTENDED RELEASE ORAL DAILY
Qty: 90 TABLET | Refills: 1 | Status: SHIPPED | OUTPATIENT
Start: 2025-03-18

## 2025-03-18 RX ORDER — DONEPEZIL HYDROCHLORIDE 5 MG/1
5 TABLET, FILM COATED ORAL DAILY
Qty: 90 TABLET | Refills: 1 | Status: SHIPPED | OUTPATIENT
Start: 2025-03-18

## 2025-03-18 RX ORDER — HYDROCHLOROTHIAZIDE 25 MG/1
25 TABLET ORAL DAILY
Qty: 90 TABLET | Refills: 1 | Status: SHIPPED | OUTPATIENT
Start: 2025-03-18

## 2025-03-18 RX ORDER — DULOXETIN HYDROCHLORIDE 30 MG/1
CAPSULE, DELAYED RELEASE ORAL
Qty: 90 CAPSULE | Refills: 1 | Status: SHIPPED | OUTPATIENT
Start: 2025-03-18

## 2025-03-18 RX ORDER — OMEPRAZOLE 40 MG/1
40 CAPSULE, DELAYED RELEASE ORAL DAILY
Qty: 90 CAPSULE | Refills: 1 | Status: SHIPPED | OUTPATIENT
Start: 2025-03-18

## 2025-03-18 RX ORDER — METFORMIN HYDROCHLORIDE 500 MG/1
500 TABLET, EXTENDED RELEASE ORAL 2 TIMES DAILY WITH MEALS
Qty: 180 TABLET | Refills: 1 | Status: SHIPPED | OUTPATIENT
Start: 2025-03-18

## 2025-03-18 RX ORDER — CITALOPRAM 20 MG/1
20 TABLET, FILM COATED ORAL DAILY
Qty: 90 TABLET | Refills: 1 | Status: SHIPPED | OUTPATIENT
Start: 2025-03-18

## 2025-03-18 RX ORDER — ROSUVASTATIN CALCIUM 20 MG/1
20 TABLET, COATED ORAL NIGHTLY
Qty: 90 TABLET | Refills: 1 | Status: SHIPPED | OUTPATIENT
Start: 2025-03-18

## 2025-03-18 RX ORDER — METOPROLOL TARTRATE 25 MG/1
25 TABLET, FILM COATED ORAL 2 TIMES DAILY
Qty: 180 TABLET | Refills: 1 | Status: SHIPPED | OUTPATIENT
Start: 2025-03-18

## 2025-03-18 RX ORDER — NAPROXEN SODIUM 550 MG/1
550 TABLET ORAL 2 TIMES DAILY WITH MEALS
Qty: 180 TABLET | Refills: 1 | Status: SHIPPED | OUTPATIENT
Start: 2025-03-18

## 2025-03-18 NOTE — PROGRESS NOTES
Salty Barahona MD    [address] [place] [address]     PATIENT NAME: Dayanara Monae  : 1945  DATE: 3/18/25  MRN: [id number]      Billing Provider: Salty Barahona MD  Level of Service: [place] OFFICE/OUTPT VISIT, EST, LEVL IV, 30-39 MIN  Patient PCP Information       Provider PCP Type    Salty Barahona MD General                   Update PCP  Update Chief Complaint         History of Present Illness / Problem Focused Workflow     Dayanara Monae presents to the clinic with follow up.   Chief Complaint   Patient presents with    Hypertension    Hyperlipidemia    Diabetes     Check up, medication refills. She would like Dr Barahona to look at her medication to see if she can D/C some of her medications    Health Maintenance     Shingles Vaccine(1 of 2) refuses  RSV Vaccine (Age 60+ and Pregnant patients) refuses  Diabetes Urine Screening due on 2023 today  Influenza Vaccine(1) due on 2024 refuses  COVID-19 Vaccine( season) refuses  Hemoglobin A1c due on 2024 today  Diabetic Eye Exam due on 2025 Dr. Chavez 1 month ago      HPI    Review of Systems     Review of Systems   Constitutional:  Negative for activity change, appetite change, chills, fatigue and fever.   HENT:  Negative for nasal congestion, ear pain, hearing loss, postnasal drip and sore throat.    Respiratory:  Negative for cough, chest tightness, shortness of breath and wheezing.    Cardiovascular:  Negative for chest pain, palpitations, leg swelling and claudication.   Gastrointestinal:  Negative for abdominal pain, change in bowel habit, constipation, diarrhea, nausea and vomiting.   Genitourinary:  Negative for dysuria.   Musculoskeletal:  Negative for arthralgias, back pain, gait problem and myalgias.   Integumentary:  Negative for rash.   Neurological:  Negative for weakness and headaches.   Psychiatric/Behavioral:  Negative for suicidal ideas. The  patient is not nervous/anxious.         Medical / Social / Family History     Past Medical History:   Diagnosis Date    Actinic keratoses     Atrial fibrillation     CVA (cerebral vascular accident)     Epilepsy     Non-melanoma skin cancer     Osteoporosis     Skin cancer        Past Surgical History:   Procedure Laterality Date    APPENDECTOMY      carpel tunnel Right     CHOLECYSTECTOMY      EYE SURGERY      HAND SURGERY      HYSTERECTOMY      JOINT REPLACEMENT         Social History  Ms.  reports that she has never smoked. She has never been exposed to tobacco smoke. She has never used smokeless tobacco. She reports that she does not drink alcohol and does not use drugs.    Family History  Ms.'s family history includes Diabetes in her sister; Hypertension in her mother; Stroke in her father.    Medications and Allergies     Medications  Outpatient Medications Marked as Taking for the 3/18/25 encounter (Office Visit) with Salty Barahona MD   Medication Sig Dispense Refill    albuterol (VENTOLIN HFA) 90 mcg/actuation inhaler Inhale 2 puffs into the lungs every 6 (six) hours as needed for Wheezing. Or COUGH 18 g 5    aspirin (ECOTRIN) 81 MG EC tablet Take 81 mg by mouth once daily.      blood sugar diagnostic (FREESTYLE LITE STRIPS) Strp 1 each by Misc.(Non-Drug; Combo Route) route 2 (two) times a day. 100 each 2    blood-glucose meter (BLOOD GLUCOSE MONITOR KIT MISC) 1 m by Misc.(Non-Drug; Combo Route) route.      calcium carb-mag hydrox-simeth 1,200 mg-270 mg -80 mg/10 mL Susp 1 tablet.      citalopram (CELEXA) 20 MG tablet Take 1 tablet (20 mg total) by mouth once daily. For MOOD 90 tablet 1    clobetasoL (TEMOVATE) 0.05 % cream Apply to AA on body BID PRN flares tapering with improvement 60 g 6    clobetasoL (TEMOVATE) 0.05 % cream Apply to rash on feet nightly. 60 g 2    cyanocobalamin (VITAMIN B-12) 1000 MCG tablet Take 1,000 mcg by mouth.      donepeziL (ARICEPT) 5 MG tablet Take 1 tablet (5 mg total)  by mouth once daily. For MEMORY 90 tablet 1    DULoxetine (CYMBALTA) 30 MG capsule TAKE 1 CAPSULE BY MOUTH ONCE DAILY FOR  JOINT  PAIN 90 capsule 1    fluticasone propionate (FLONASE) 50 mcg/actuation nasal spray 1 spray (50 mcg total) by Each Nostril route once daily. 16 g 5    ketoconazole (NIZORAL) 2 % cream Apply to rash on feet daily 60 g 2    lancets 33 gauge Misc 1 lancet by Misc.(Non-Drug; Combo Route) route once daily. 100 each 1    latanoprost 0.005 % ophthalmic solution Place 1 drop into both eyes every evening.      mupirocin (BACTROBAN) 2 % ointment Apply to affected areas three times daily 30 g 2    travoprost (TRAVATAN Z) 0.004 % ophthalmic solution Place 1 drop into both eyes every evening.      [DISCONTINUED] citalopram (CELEXA) 20 MG tablet Take 1 tablet (20 mg total) by mouth once daily. For MOOD 30 tablet 0    [DISCONTINUED] donepeziL (ARICEPT) 5 MG tablet Take 1 tablet (5 mg total) by mouth once daily. For MEMORY 90 tablet 0    [DISCONTINUED] DULoxetine (CYMBALTA) 30 MG capsule TAKE 1 CAPSULE BY MOUTH ONCE DAILY FOR  JOINT  PAIN 90 capsule 0    [DISCONTINUED] hydroCHLOROthiazide (HYDRODIURIL) 25 MG tablet Take 1 tablet (25 mg total) by mouth once daily. For BLOOD PRESSURE 30 tablet 0    [DISCONTINUED] meloxicam (MOBIC) 15 MG tablet Take 1 tablet (15 mg total) by mouth daily as needed for Pain. And INFLAMMATION 90 tablet 0    [DISCONTINUED] metFORMIN (GLUCOPHAGE-XR) 500 MG ER 24hr tablet Take 1 tablet (500 mg total) by mouth 2 (two) times daily with meals. For diabetes 180 tablet 0    [DISCONTINUED] metoprolol tartrate (LOPRESSOR) 25 MG tablet Take 1 tablet (25 mg total) by mouth 2 (two) times daily. For HEART 180 tablet 0    [DISCONTINUED] naproxen sodium (ANAPROX) 550 MG tablet Take 1 tablet (550 mg total) by mouth 2 (two) times daily with meals. For PAIN and INFLAMMATION 180 tablet 0    [DISCONTINUED] omeprazole (PRILOSEC) 40 MG capsule Take 1 capsule (40 mg total) by mouth once daily. For  STOMACH 90 capsule 0    [DISCONTINUED] potassium chloride (K-TAB) 20 mEq Take 1 tablet (20 mEq total) by mouth once daily. 90 tablet 0    [DISCONTINUED] rosuvastatin (CRESTOR) 20 MG tablet Take 1 tablet (20 mg total) by mouth every evening. For CHOLESTEROL 90 tablet 0       Allergies  Review of patient's allergies indicates:   Allergen Reactions    Vaseline carbolated petroleum Blisters    Adhesive tape-silicones     Bactrim [sulfamethoxazole-trimethoprim]        Physical Examination     Vitals:    03/18/25 1035   BP: 132/69   Pulse: 87   Resp: 16   Temp: 98.1 °F (36.7 °C)     Physical Exam  Constitutional:       Appearance: Normal appearance.   HENT:      Head: Normocephalic and atraumatic.   Eyes:      Extraocular Movements: Extraocular movements intact.   Cardiovascular:      Rate and Rhythm: Normal rate and regular rhythm.   Pulmonary:      Effort: Pulmonary effort is normal.      Breath sounds: Normal breath sounds.   Skin:     General: Skin is warm.   Neurological:      Mental Status: She is alert and oriented to person, place, and time. Mental status is at baseline.            Assessment and Plan (including Health Maintenance)      Problem List  Smart Sets  Document Outside HM   :    Plan:     Medications refilled. I did not identify any obvious medications that should be removed.     I have reviewed the chronic health problems listed below. They are stable at this time and no changes are being made      Health Maintenance Due   Topic Date Due    Shingles Vaccine (1 of 2) Never done    RSV Vaccine (Age 60+ and Pregnant patients) (1 - 1-dose 75+ series) Never done    Diabetes Urine Screening  09/19/2023    COVID-19 Vaccine (4 - 2024-25 season) 09/01/2024    Hemoglobin A1c  11/02/2024    Diabetic Eye Exam  01/11/2025       Problem List Items Addressed This Visit          Neuro    Dementia with behavioral disturbance (Chronic)    Relevant Medications    donepeziL (ARICEPT) 5 MG tablet    Bilateral carpal tunnel  syndrome (Chronic)    Relevant Medications    naproxen sodium (ANAPROX) 550 MG tablet       Psychiatric    Recurrent major depressive disorder, in partial remission (Chronic)    Relevant Medications    citalopram (CELEXA) 20 MG tablet       Cardiac/Vascular    Essential hypertension (Chronic)    Overview    - Goal blood pressure for most patients is less than 130/85. Both numbers are important. If you have questions about your specific goal blood pressure, please ask at your clinic visits.   - Check blood pressure outside of clinic, record the numbers, and bring the log to all your office visits.   - If you have any chest pain, SOB, or other concerning symptoms, report these immediately, or go to the nearest ER.    - Recommend cardiovascular exercise, at least 3 times per week, for at least 15 minutes.   - Eat a heart healthy diet.            Relevant Medications    metoprolol tartrate (LOPRESSOR) 25 MG tablet    hydroCHLOROthiazide (HYDRODIURIL) 25 MG tablet    Other Relevant Orders    Basic Metabolic Panel    CBC Auto Differential    Lipid Panel    Microalbumin/Creatinine Ratio, Urine    Urinalysis, Reflex to Urine Culture    Mixed hyperlipidemia (Chronic)    Relevant Medications    rosuvastatin (CRESTOR) 20 MG tablet    Other Relevant Orders    Lipid Panel       Endocrine    Class 2 severe obesity due to excess calories with serious comorbidity and body mass index (BMI) of 36.0 to 36.9 in adult (Chronic)    Type 2 diabetes mellitus without complication, without long-term current use of insulin - Primary (Chronic)    Current Assessment & Plan    - Check glucose outside of clinic, record numbers, and bring log to follow up visit.    - Take medications as directed, and bring all medications to every clinic appointment.    - Eat a diabetic diet, if there are concerns about what that entails, we have a diabetic educator in our clinic.    - Cardiovascular exercise at least 3 times per week, for at least 15 minutes.     - Patient should be on a Statin medication, unless patient cannot tolerate a Statin.     - Recommend yearly, dilated eye exams for all Diabetic Patients.    - Monitor feet for calluses, abrasion, or other abnormalities. Report any concerns at every clinic visit.    -   Hemoglobin A1C   Date Value Ref Range Status   05/02/2024 7.8 (H) 4.5 - 6.6 % Final     Comment:       Normal:               <5.7%  Pre-Diabetic:       5.7% to 6.4%  Diabetic:             >6.4%  Diabetic Goal:     <7%   12/22/2023 7.9 (H) 4.5 - 6.6 % Final     Comment:       Normal:               <5.7%  Pre-Diabetic:       5.7% to 6.4%  Diabetic:             >6.4%  Diabetic Goal:     <7%   09/25/2023 7.0 (H) 4.5 - 6.6 % Final     Comment:       Normal:               <5.7%  Pre-Diabetic:       5.7% to 6.4%  Diabetic:             >6.4%  Diabetic Goal:     <7%               Relevant Medications    metFORMIN (GLUCOPHAGE-XR) 500 MG ER 24hr tablet    Other Relevant Orders    Basic Metabolic Panel    CBC Auto Differential    Lipid Panel    Microalbumin/Creatinine Ratio, Urine    Hemoglobin A1C    Urinalysis, Reflex to Urine Culture       GI    Gastroesophageal reflux disease without esophagitis (Chronic)    Relevant Medications    omeprazole (PRILOSEC) 40 MG capsule       Orthopedic    Neck pain (Chronic)    Relevant Medications    meloxicam (MOBIC) 15 MG tablet    naproxen sodium (ANAPROX) 550 MG tablet    DULoxetine (CYMBALTA) 30 MG capsule     Other Visit Diagnoses         Low blood potassium  (Chronic)       Relevant Medications    potassium chloride (K-TAB) 20 mEq      Gastroesophageal reflux disease, unspecified whether esophagitis present  (Chronic)       Relevant Medications    omeprazole (PRILOSEC) 40 MG capsule            Health Maintenance Topics with due status: Not Due       Topic Last Completion Date    TETANUS VACCINE 05/19/2015    DEXA Scan 05/07/2024    Lipid Panel 07/23/2024       Procedures     Future Appointments   Date Time Provider  [facility]   4/22/2025  1:00 PM Rebekah Garcia MD [facility]   8/12/2025  8:30 AM INFUSION, [facility]FUS[facility] [place] [facility]        Follow up in about 6 months (around 9/18/2025), or if symptoms worsen or fail to improve, for chronic health problems, hypertension.     Signature:  Salty Barahona MD  [address] [place] [address]  Phone #: [phone]  Fax #: [phone]    Date of encounter: 3/18/25    There are no Patient Instructions on file for this visit.

## 2025-03-19 ENCOUNTER — PATIENT OUTREACH (OUTPATIENT)
Facility: HOSPITAL | Age: 80
End: 2025-03-19
Payer: MEDICARE

## 2025-03-19 NOTE — PROGRESS NOTES
Population Health Chart Review & Patient Outreach Details    Updates Requested / Reviewed:  [x]  Care Team Updated    Health Maintenance Topics Addressed and Outreach Outcomes / Actions Taken:  Diabetic Eye Exam [x] SARA sent to [facility].

## 2025-03-19 NOTE — LETTER
AUTHORIZATION FOR RELEASE OF   CONFIDENTIAL INFORMATION    Dear [facility],    We are seeing Dayanara Monae, date of birth 1945, in the clinic at [facility]. Salty Barahona MD is the patient's PCP. Dayanara Monae has an outstanding lab/procedure at the time we reviewed her chart. In order to help keep her health information updated, she has authorized us to request the following medical record(s):        (  )  MAMMOGRAM                                      (  )  COLONOSCOPY      (  )  PAP SMEAR                                          (  )  OUTSIDE LAB RESULTS     (  )  DEXA SCAN                                          ( x )  EYE EXAM            (  )  FOOT EXAM                                          (  )  ENTIRE RECORD     (  )  OUTSIDE IMMUNIZATIONS                 (  )  _______________         Please fax records to Homero Keane LPN Care Coordinator at [phone].      If you have any questions, please contact Homero at [phone].           Patient Name: Dayanara Monae  : 1945  Patient Phone #: [phone]                Dayanara Monae  MRN: [id number]  : 1945  Age: 79 y.o.  Sex: female         Patient/Legal Guardian Signature  This signature was collected at 2025    Dayanara Monae     Self  _______________________________   Printed Name/Relationship to Patient      Consent for Examination and Treatment: I hereby authorize the providers and employees of [facility] ([facility]) to provide medical treatment/services which includes, but is not limited to, performing and administering tests and diagnostic procedures that are deemed necessary, including, but not limited to, imaging examinations, blood tests and other laboratory procedures as may be required by the hospital, clinic, or may be ordered by my physician(s) or persons working under the general and/or special instructions of my physician(s).      I understand and agree that  this consent covers all authorized persons, including but not limited to physicians, residents, nurse practitioners, physicians' assistants, specialists, consultants, student nurses, and independently contracted physicians, who are called upon by the physician in charge, to carry out the diagnostic procedures and medical or surgical treatment.     I hereby authorize [facility] to retain or dispose of any specimens or tissue, should there be such remaining from any test or procedure.     I hereby authorize and give consent for [facility] providers and employees to take photographs, images or videotapes of such diagnostic, surgical or treatment procedures of Patient as may be required by [facility] or as may be ordered by a physician. I further acknowledge and agree that [facility] may use cameras or other devices for patient monitoring.     I am aware that the practice of medicine is not an exact science, and I acknowledge that no guarantees have been made to me as to the outcome of any tests, procedures or treatment.     Authorization for Release of Information: I understand that my insurance company and/or their agents may need information necessary to make determinations about payment/reimbursement. I hereby provide authorization to release to all insurance companies, their successors, assignees, other parties with whom they may have contracted, or others acting on their behalf, that are involved with payment for any hospital and/or clinic charges incurred by the patient, any information that they request and deem necessary for payment/reimbursement, and/or quality review.  I further authorize the release of my health information to physicians or other health care practitioners on staff who are involved in my health care now and in the future, and to other health care providers, entities, or institutions for the purpose of my continued care and treatment, including referrals.     REGISTRATION AUTHORIZATION  Form No. 91818  (Rev. 3/25/2024)    Page 1 of 3                       Medicare Patient's Certification and Authorization to Release Information and Payment Request:  I certify that the information given by me in applying for payment under Title XVIII of the Social Security Act is correct. I authorize any cisneros of medical or other information about me to release to the Social SecurityAdministration, or its intermediaries or carriers, any information needed for this or a related Medicare claim. I request that payment of authorized benefits be made on my behalf.     Assignment of Insurance Benefits:   I hereby authorize any and all insurance companies, health plans, defined   benefit plans, health insurers or any entity that is or may be responsible for payment of my medical expenses to pay all hospital and medical benefits now due, and to become due and payable to me under any hospital benefits, sick benefits, injury benefits or any other benefit for services rendered to me, including Major Medical Benefits, direct to [facility] and all independently contracted physicians. I assign any and all rights that I may have against any and all insurance companies, health plans, defined benefit plans, health insurers or any entity that is or may be responsible for payment of my medical expenses, including, but not limited to any right to appeal a denial of a claim, any right to bring any action, lawsuit, administrative proceeding, or other cause of action on my behalf. I specifically assign my right to pursue litigation against any and all insurance companies, health plans, defined benefit plans, health insurers or any entity that is or may be responsible for payment of my medical expenses based upon a refusal to pay charges.            E. Valuables: It is understood and agreed that [facility] is not liable for the damage to or loss of any money, jewelry,   documents, dentures, eye glasses, hearing aids, prosthetics, or other property of value.      F. Computer Equipment: I understand and agree that should I choose to use computer equipment owned by [facility] or if I choose to access the Internet via [facility]s network, I do so at my own risk. [facility] is not responsible for any damage to my computer equipment or to any damages of any type that might arise from my loss of equipment or data.     G. Acceptance of Financial Responsibility:  I agree that in consideration of the services and   supplies that have been   or will be furnished to the patient, I am hereby obligated to pay all charges made for or on the account of the patient according to the standard rates (in effect at the time the services and supplies are delivered) established by [facility], including its Patient Financial Assistance Policy to the extent it is applicable. I understand that I am responsible for all charges, or portions thereof, not covered by insurance or other sources. Patient refunds will be distributed only after balances at all [facility] facilities are paid.     H. Communication Authorization:  I hereby authorize [facility] and its representatives, along with any billing service   or  who may work on their behalf, to contact me on   my cell phone and/or home phone using pre- recorded messages, artificial voice messages, automatic telephone dialing devices or other computer assisted technology, or by electronic      mail, text messaging, or by any other form of electronic communication. This includes, but is not limited to, appointment reminders, yearly physical exam reminders, preventive care reminders, patient campaigns, welcome calls, and calls about account balances on my account or any account on which I am listed as a guarantor. I understand I have the right to opt out of these communications at any time.      Relationship  Between  Facility and  Provider:      I understand that some, but not all, providers furnishing services to the patient are not employees or agents  of [facility]. The patient is under the care and supervision of his/her attending physician, and it is the responsibility of the facility and its nursing staff to carry out the instructions of such physicians. It is the responsibility of the patient's physician/designee to obtain the patient's informed consent, when required, for medical or surgical treatment, special diagnostic or therapeutic procedures, or hospital services rendered for the patient under the special instructions of the physician/designee.           REGISTRATION AUTHORIZATION  Form No. 91292 (Rev. 3/25/2024)    Page 2 of 3                       Immunizations: [facility] shares immunization information with state sponsored health departments to help you and your doctor keep track of your immunization records. By signing, you consent to have this information shared with the health department in your state:                                [place] - LINKS ([place] Immunization Network for Kids Statewide)                                [place] - MIIX ([place] Immunization Information eXchange)                                [place] - ImmPRINT (Immunization Patient Registry with Integrated Technology)     TERM: This authorization is valid for this and subsequent care/treatment I receive at [facility] and will remain valid unless/until revoked in writing by me.     [facility]: As used in this document, [facility] means all [facility] owned and managed facilities, including, but not limited to, all health centers, surgery centers, clinics, urgent care centers, and hospitals.         [facility] complies with applicable Federal civil rights laws and does not discriminate on the basis of race, color, national origin, age, disability, or sex.  ATENCIÓN: si habla willie, tiene a mcknight disposición servicios gratuitos de asistencia lingüística. Shakila calero [phone].  [facility] Ý: N?u b?n nói Ti?ng Vi?t, có các d?ch v? h? tr? ngôn ng? mi?n phí  dành cho b?n. G?i s? [phone].        REGISTRATION AUTHORIZATION  Form No. 15179 (Rev. 3/25/2024)   Page 3 of 3

## 2025-03-20 ENCOUNTER — RESULTS FOLLOW-UP (OUTPATIENT)
Dept: FAMILY MEDICINE | Facility: CLINIC | Age: 80
End: 2025-03-20

## 2025-04-22 ENCOUNTER — OFFICE VISIT (OUTPATIENT)
Dept: DERMATOLOGY | Facility: CLINIC | Age: 80
End: 2025-04-22
Payer: MEDICARE

## 2025-04-22 VITALS — BODY MASS INDEX: 37.02 KG/M2 | WEIGHT: 183.63 LBS | HEIGHT: 59 IN

## 2025-04-22 DIAGNOSIS — Z79.899 HIGH RISK MEDICATION USE: ICD-10-CM

## 2025-04-22 DIAGNOSIS — B35.3 TINEA PEDIS OF BOTH FEET: ICD-10-CM

## 2025-04-22 DIAGNOSIS — L40.9 PSORIASIS: Primary | ICD-10-CM

## 2025-04-22 DIAGNOSIS — L28.0 LICHEN SIMPLEX CHRONICUS: ICD-10-CM

## 2025-04-22 RX ORDER — ACITRETIN 25 MG/1
25 CAPSULE ORAL
Qty: 90 CAPSULE | Refills: 1 | Status: SHIPPED | OUTPATIENT
Start: 2025-04-22 | End: 2025-10-19

## 2025-04-22 RX ORDER — KETOCONAZOLE 20 MG/G
CREAM TOPICAL
Qty: 60 G | Refills: 5 | Status: SHIPPED | OUTPATIENT
Start: 2025-04-22

## 2025-04-22 RX ORDER — CLOBETASOL PROPIONATE 0.5 MG/G
CREAM TOPICAL
Qty: 60 G | Refills: 3 | Status: SHIPPED | OUTPATIENT
Start: 2025-04-22

## 2025-04-22 NOTE — PROGRESS NOTES
[facility]   Rebekah Garcia MD    Patient Name: Dayanara Monae  Patient YOB: 1945   Date of Service: 4/22/25    CC: Follow-up Psoriasis    HPI: Dayanara Monae is a 79 y.o. female here today for follow-up of psoriasis, last seen 10/22/2024.  Previous treatments include Acitretin, clobetasol cream and ketoconazole cream.  Overall, the psoriasis is stable.  Treatment plan was followed as directed.    Past Medical History:   Diagnosis Date    Actinic keratoses     Atrial fibrillation     CVA (cerebral vascular accident)     Epilepsy     Non-melanoma skin cancer     Osteoporosis     Skin cancer      Past Surgical History:   Procedure Laterality Date    APPENDECTOMY      carpel tunnel Right     CHOLECYSTECTOMY      EYE SURGERY      HAND SURGERY      HYSTERECTOMY      JOINT REPLACEMENT       Review of patient's allergies indicates:   Allergen Reactions    Vaseline carbolated petroleum Blisters    Adhesive tape-silicones     Bactrim [sulfamethoxazole-trimethoprim]      Current Medications[1]    ROS: A focused review of systems was obtained and negative.     Exam: A focused skin exam was performed. All areas examined were normal except as mentioned in the assessment and plan below.  General Appearance of the patient is well developed and well nourished.  Orientation: alert and oriented x 3.  Mood and affect: pleasant.    Assessment:   The primary encounter diagnosis was Psoriasis. Diagnoses of High risk medication use, Tinea pedis of both feet, and Lichen simplex chronicus were also pertinent to this visit.    Plan:   Medications Ordered This Encounter   Medications    acitretin (SORIATANE) 25 MG capsule     Sig: Take 1 capsule (25 mg total) by mouth before breakfast.     Dispense:  90 capsule     Refill:  1    clobetasoL (TEMOVATE) 0.05 % cream     Sig: Apply to rash on feet nightly.     Dispense:  60 g     Refill:  3    ketoconazole (NIZORAL) 2 % cream     Sig: Apply to rash on feet daily      Dispense:  60 g     Refill:  5     Plaque Psoriasis  - psoriasiform plaques with micaceous scale  Status: stable    Plan: Counseling  I counseled the patient regarding the following:  Skin care: Emollients, ambient sun exposure, shampoos with tar, selenium or zinc pyrithione can improve psoriasis.  Expectations: Psoriasis is chronic in nature with periods of remissions and flares. Flares can be triggered by stress, infections (group A strep), certain medications and alcohol.  Contact office if: Psoriasis worsens, or fails to improve despite several months of treatment.  - Continue Acitretin   - continue clobetasol     Tinea Pedis   - clear    Plan: Counseling  I counseled the patient regarding the following:  Skin care: Most forms of Tinea Pedis can be treated with topical antifungal creams.  Expectations: Tinea Pedis is a dermatophyte infection of the feet. Risk factors include pets, humid or warm climates. Cure rates are excellent, but recurrence is high.  - Continue to use ketoconazole for prevention given topical steroid use    Lichen Simplex Chronicus  - lichenified erythematous eczematous plaques  Status: Inadequately controlled    Plan: Counseling  I counseled the patient regarding the following:  Skin care: Recommend trimming nails short, anti-itch lotions such as Sarna, moisturizers and antihistamines.  Expectations: Lichen Simplex Chronicus is a form of eczema that thickens when the skin is scratched or rubbed a lot.  Contact office if: Lichen Simplex Chronicus worsens.  - Recommeded CeraVe Anti-Itch     High Risk Medication Monitoring (Z79.899) : The risks and benefits of the medication were reviewed in full with the patient. Should any side effects occur, the patient will stop the medication and contact me immediately.    Soriatane Counseling: I discussed with the patient the risks of acitretin including but not limited to hair loss, dry  lips/skin/eyes, liver damage, hyperlipidemia, depression/suicidal  ideation, photosensitivity. Serious rare side  effects can include but are not limited to pancreatitis, pseudotumor cerebri, bony changes, clot  formation/stroke/heart attack. Patient understands that alcohol is contraindicated since it can result in liver toxicity  and significantly prolong the elimination of the drug by many years. Patient understands that this drug is not  meant for any woman of childbearing age or who plans to get pregnant since it is teratogenic and has a long drug  elimination half life.    - labs from Dr. Barahona reviewed and wnl     Follow up in about 6 months (around 10/22/2025) for Psoriasis.    Rebekah Garcia MD           [1]   Current Outpatient Medications:     acitretin (SORIATANE) 25 MG capsule, Take 1 capsule (25 mg total) by mouth before breakfast., Disp: 90 capsule, Rfl: 1    albuterol (VENTOLIN HFA) 90 mcg/actuation inhaler, Inhale 2 puffs into the lungs every 6 (six) hours as needed for Wheezing. Or COUGH, Disp: 18 g, Rfl: 5    aspirin (ECOTRIN) 81 MG EC tablet, Take 81 mg by mouth once daily., Disp: , Rfl:     blood sugar diagnostic (FREESTYLE LITE STRIPS) Strp, 1 each by Misc.(Non-Drug; Combo Route) route 2 (two) times a day., Disp: 100 each, Rfl: 2    blood-glucose meter (BLOOD GLUCOSE MONITOR KIT MISC), 1 m by Misc.(Non-Drug; Combo Route) route., Disp: , Rfl:     calcium carb-mag hydrox-simeth 1,200 mg-270 mg -80 mg/10 mL Susp, 1 tablet., Disp: , Rfl:     citalopram (CELEXA) 20 MG tablet, Take 1 tablet (20 mg total) by mouth once daily. For MOOD, Disp: 90 tablet, Rfl: 1    clobetasoL (TEMOVATE) 0.05 % cream, Apply to AA on body BID PRN flares tapering with improvement, Disp: 60 g, Rfl: 6    clobetasoL (TEMOVATE) 0.05 % cream, Apply to rash on feet nightly., Disp: 60 g, Rfl: 3    cyanocobalamin (VITAMIN B-12) 1000 MCG tablet, Take 1,000 mcg by mouth., Disp: , Rfl:     donepeziL (ARICEPT) 5 MG tablet, Take 1 tablet (5 mg total) by mouth once daily. For MEMORY, Disp: 90 tablet, Rfl:  1    DULoxetine (CYMBALTA) 30 MG capsule, TAKE 1 CAPSULE BY MOUTH ONCE DAILY FOR  JOINT  PAIN, Disp: 90 capsule, Rfl: 1    fluticasone propionate (FLONASE) 50 mcg/actuation nasal spray, 1 spray (50 mcg total) by Each Nostril route once daily., Disp: 16 g, Rfl: 5    hydroCHLOROthiazide (HYDRODIURIL) 25 MG tablet, Take 1 tablet (25 mg total) by mouth once daily. For BLOOD PRESSURE, Disp: 90 tablet, Rfl: 1    ketoconazole (NIZORAL) 2 % cream, Apply to rash on feet daily, Disp: 60 g, Rfl: 5    lancets 33 gauge Misc, 1 lancet by Misc.(Non-Drug; Combo Route) route once daily., Disp: 100 each, Rfl: 1    latanoprost 0.005 % ophthalmic solution, Place 1 drop into both eyes every evening., Disp: , Rfl:     meloxicam (MOBIC) 15 MG tablet, Take 1 tablet (15 mg total) by mouth daily as needed for Pain. And INFLAMMATION, Disp: 90 tablet, Rfl: 1    metFORMIN (GLUCOPHAGE-XR) 500 MG ER 24hr tablet, Take 1 tablet (500 mg total) by mouth 2 (two) times daily with meals. For diabetes, Disp: 180 tablet, Rfl: 1    metoprolol tartrate (LOPRESSOR) 25 MG tablet, Take 1 tablet (25 mg total) by mouth 2 (two) times daily. For HEART, Disp: 180 tablet, Rfl: 1    mupirocin (BACTROBAN) 2 % ointment, Apply to affected areas three times daily, Disp: 30 g, Rfl: 2    naproxen sodium (ANAPROX) 550 MG tablet, Take 1 tablet (550 mg total) by mouth 2 (two) times daily with meals. For PAIN and INFLAMMATION, Disp: 180 tablet, Rfl: 1    omeprazole (PRILOSEC) 40 MG capsule, Take 1 capsule (40 mg total) by mouth once daily. For STOMACH, Disp: 90 capsule, Rfl: 1    potassium chloride (K-TAB) 20 mEq, Take 1 tablet (20 mEq total) by mouth once daily., Disp: 90 tablet, Rfl: 1    rosuvastatin (CRESTOR) 20 MG tablet, Take 1 tablet (20 mg total) by mouth every evening. For CHOLESTEROL, Disp: 90 tablet, Rfl: 1    travoprost (TRAVATAN Z) 0.004 % ophthalmic solution, Place 1 drop into both eyes every evening., Disp: , Rfl:

## 2025-05-21 ENCOUNTER — HOSPITAL ENCOUNTER (OUTPATIENT)
Dept: RADIOLOGY | Facility: HOSPITAL | Age: 80
Discharge: HOME | End: 2025-05-21
Attending: NURSE PRACTITIONER
Payer: MEDICARE

## 2025-05-21 ENCOUNTER — OFFICE VISIT (OUTPATIENT)
Dept: FAMILY MEDICINE | Facility: CLINIC | Age: 80
End: 2025-05-21
Payer: MEDICARE

## 2025-05-21 VITALS
OXYGEN SATURATION: 98 % | WEIGHT: 181 LBS | BODY MASS INDEX: 36.49 KG/M2 | DIASTOLIC BLOOD PRESSURE: 74 MMHG | SYSTOLIC BLOOD PRESSURE: 120 MMHG | TEMPERATURE: 98 F | RESPIRATION RATE: 20 BRPM | HEIGHT: 59 IN | HEART RATE: 77 BPM

## 2025-05-21 DIAGNOSIS — M25.561 ACUTE PAIN OF RIGHT KNEE: ICD-10-CM

## 2025-05-21 DIAGNOSIS — S80.01XA CONTUSION OF RIGHT KNEE, INITIAL ENCOUNTER: Primary | ICD-10-CM

## 2025-05-21 PROCEDURE — 73562 X-RAY EXAM OF KNEE 3: CPT | Mod: 26,RT,, | Performed by: RADIOLOGY

## 2025-05-21 PROCEDURE — 96372 THER/PROPH/DIAG INJ SC/IM: CPT | Mod: ,,, | Performed by: NURSE PRACTITIONER

## 2025-05-21 PROCEDURE — 99214 OFFICE O/P EST MOD 30 MIN: CPT | Mod: ,,, | Performed by: NURSE PRACTITIONER

## 2025-05-21 PROCEDURE — 73562 X-RAY EXAM OF KNEE 3: CPT | Mod: TC,PN,RT

## 2025-05-21 RX ORDER — KETOROLAC TROMETHAMINE 30 MG/ML
30 INJECTION, SOLUTION INTRAMUSCULAR; INTRAVENOUS
Status: COMPLETED | OUTPATIENT
Start: 2025-05-21 | End: 2025-05-21

## 2025-05-21 RX ADMIN — KETOROLAC TROMETHAMINE 30 MG: 30 INJECTION, SOLUTION INTRAMUSCULAR; INTRAVENOUS at 10:05

## 2025-05-21 NOTE — PROGRESS NOTES
Maria R Kimball DNP, CHARLEE    [address] [place] [address]     PATIENT NAME: Dayanara Monae  : 1945  DATE: 25  MRN: [id number]      Billing Provider: Maria R Kimball DNP, CHARLEE  Level of Service:   Patient PCP Information       Provider PCP Type    Salty Barahona MD General            Reason for Visit / Chief Complaint: Fall (Fell last Wednesday and her right knee is hurting her. The pain shoots down her leg. There has been some swelling, but thinks it looks better today.)       Update PCP  Update Chief Complaint         History of Present Illness / Problem Focused Workflow     Dayanara Monae presents to the clinic with Fall (Fell last Wednesday and her right knee is hurting her. The pain shoots down her leg. There has been some swelling, but thinks it looks better today.)     Fall  Pertinent negatives include no abdominal pain, fever, headaches, nausea or vomiting.       Review of Systems     Review of Systems   Constitutional:  Negative for activity change, appetite change, chills, fatigue and fever.   HENT:  Negative for nasal congestion, ear pain, hearing loss, postnasal drip and sore throat.    Respiratory:  Negative for cough, chest tightness, shortness of breath and wheezing.    Cardiovascular:  Negative for chest pain, palpitations, leg swelling and claudication.   Gastrointestinal:  Negative for abdominal pain, change in bowel habit, constipation, diarrhea, nausea and vomiting.   Genitourinary:  Negative for dysuria.   Musculoskeletal:  Positive for arthralgias and joint swelling. Negative for back pain, gait problem and myalgias.   Integumentary:  Negative for rash.   Neurological:  Negative for weakness and headaches.   Psychiatric/Behavioral:  Negative for suicidal ideas. The patient is not nervous/anxious.         Medical / Social / Family History     Past Medical History:   Diagnosis Date    Actinic keratoses     Atrial fibrillation     CVA  (cerebral vascular accident)     Epilepsy     Non-melanoma skin cancer     Osteoporosis     Skin cancer        Past Surgical History:   Procedure Laterality Date    APPENDECTOMY      carpel tunnel Right     CHOLECYSTECTOMY      EYE SURGERY      HAND SURGERY      HYSTERECTOMY      JOINT REPLACEMENT         Social History  Ms. Dayanara Monae  reports that she has never smoked. She has never been exposed to tobacco smoke. She has never used smokeless tobacco. She reports that she does not drink alcohol and does not use drugs.    Family History  Ms. Dayanara Monae's family history includes Diabetes in her sister; Hypertension in her mother; Stroke in her father.    Medications and Allergies     Medications  Outpatient Medications Marked as Taking for the 5/21/25 encounter (Office Visit) with Maria R Kimball, SHANON, FNP   Medication Sig Dispense Refill    acitretin (SORIATANE) 25 MG capsule Take 1 capsule (25 mg total) by mouth before breakfast. 90 capsule 1    albuterol (VENTOLIN HFA) 90 mcg/actuation inhaler Inhale 2 puffs into the lungs every 6 (six) hours as needed for Wheezing. Or COUGH 18 g 5    aspirin (ECOTRIN) 81 MG EC tablet Take 81 mg by mouth once daily.      blood sugar diagnostic (FREESTYLE LITE STRIPS) Strp 1 each by Misc.(Non-Drug; Combo Route) route 2 (two) times a day. 100 each 2    blood-glucose meter (BLOOD GLUCOSE MONITOR KIT MISC) 1 m by Misc.(Non-Drug; Combo Route) route.      calcium carb-mag hydrox-simeth 1,200 mg-270 mg -80 mg/10 mL Susp 1 tablet.      citalopram (CELEXA) 20 MG tablet Take 1 tablet (20 mg total) by mouth once daily. For MOOD 90 tablet 1    clobetasoL (TEMOVATE) 0.05 % cream Apply to AA on body BID PRN flares tapering with improvement 60 g 6    cyanocobalamin (VITAMIN B-12) 1000 MCG tablet Take 1,000 mcg by mouth.      donepeziL (ARICEPT) 5 MG tablet Take 1 tablet (5 mg total) by mouth once daily. For MEMORY 90 tablet 1    DULoxetine (CYMBALTA) 30 MG capsule TAKE 1 CAPSULE BY  MOUTH ONCE DAILY FOR  JOINT  PAIN 90 capsule 1    fluticasone propionate (FLONASE) 50 mcg/actuation nasal spray 1 spray (50 mcg total) by Each Nostril route once daily. 16 g 5    hydroCHLOROthiazide (HYDRODIURIL) 25 MG tablet Take 1 tablet (25 mg total) by mouth once daily. For BLOOD PRESSURE 90 tablet 1    ketoconazole (NIZORAL) 2 % cream Apply to rash on feet daily 60 g 5    lancets 33 gauge Misc 1 lancet by Misc.(Non-Drug; Combo Route) route once daily. 100 each 1    meloxicam (MOBIC) 15 MG tablet Take 1 tablet (15 mg total) by mouth daily as needed for Pain. And INFLAMMATION 90 tablet 1    metFORMIN (GLUCOPHAGE-XR) 500 MG ER 24hr tablet Take 1 tablet (500 mg total) by mouth 2 (two) times daily with meals. For diabetes 180 tablet 1    metoprolol tartrate (LOPRESSOR) 25 MG tablet Take 1 tablet (25 mg total) by mouth 2 (two) times daily. For HEART 180 tablet 1    mupirocin (BACTROBAN) 2 % ointment Apply to affected areas three times daily 30 g 2    naproxen sodium (ANAPROX) 550 MG tablet Take 1 tablet (550 mg total) by mouth 2 (two) times daily with meals. For PAIN and INFLAMMATION 180 tablet 1    omeprazole (PRILOSEC) 40 MG capsule Take 1 capsule (40 mg total) by mouth once daily. For STOMACH 90 capsule 1    potassium chloride (K-TAB) 20 mEq Take 1 tablet (20 mEq total) by mouth once daily. 90 tablet 1    rosuvastatin (CRESTOR) 20 MG tablet Take 1 tablet (20 mg total) by mouth every evening. For CHOLESTEROL 90 tablet 1     Current Facility-Administered Medications for the 5/21/25 encounter (Office Visit) with Maria R Kimball DNP, FNP   Medication Dose Route Frequency Provider Last Rate Last Admin    [COMPLETED] ketorolac injection 30 mg  30 mg Intramuscular 1 time in Clinic/HOD Maria R Kimball DNP, FNP   30 mg at 05/21/25 1031       Allergies  Review of patient's allergies indicates:   Allergen Reactions    Vaseline carbolated petroleum Blisters    Adhesive tape-silicones     Bactrim  "[sulfamethoxazole-trimethoprim]        Physical Examination     Vitals:    05/21/25 1000   BP: 120/74   BP Location: Left arm   Patient Position: Sitting   Pulse: 77   Resp: 20   Temp: 98.4 °F (36.9 °C)   TempSrc: Oral   SpO2: 98%   Weight: 82.1 kg (181 lb)   Height: 4' 11" (1.499 m)     Physical Exam  Vitals and nursing note reviewed.   Constitutional:       General: She is not in acute distress.     Appearance: Normal appearance. She is not ill-appearing.   HENT:      Nose: Nose normal.      Mouth/Throat:      Mouth: Mucous membranes are moist.   Eyes:      Extraocular Movements: Extraocular movements intact.      Pupils: Pupils are equal, round, and reactive to light.   Cardiovascular:      Rate and Rhythm: Normal rate and regular rhythm.      Pulses: Normal pulses.      Heart sounds: Normal heart sounds. No murmur heard.  Pulmonary:      Effort: Pulmonary effort is normal. No respiratory distress.      Breath sounds: Normal breath sounds. No wheezing, rhonchi or rales.   Chest:      Chest wall: No tenderness.   Abdominal:      General: Bowel sounds are normal.      Palpations: Abdomen is soft.   Musculoskeletal:         General: Normal range of motion.      Cervical back: Normal range of motion and neck supple.      Right knee: Normal range of motion. Tenderness present over the medial joint line.      Right lower leg: No edema.      Left lower leg: No edema.   Skin:     General: Skin is warm and dry.      Findings: No rash.   Neurological:      General: No focal deficit present.      Mental Status: She is alert and oriented to person, place, and time. Mental status is at baseline.   Psychiatric:         Mood and Affect: Mood normal.         Behavior: Behavior normal.          Assessment and Plan (including Health Maintenance)      Problem List  Smart Sets  Document Outside HM   :    Plan:         Health Maintenance Due   Topic Date Due    Shingles Vaccine (1 of 2) Never done    RSV Vaccine (Age 60+ and Pregnant " patients) (1 - 1-dose 75+ series) Never done    Diabetic Eye Exam  04/20/2023    COVID-19 Vaccine (4 - 2024-25 season) 09/01/2024    TETANUS VACCINE  05/19/2025       Problem List Items Addressed This Visit    None  Visit Diagnoses         Contusion of right knee, initial encounter    -  Primary      Acute pain of right knee        Relevant Medications    ketorolac injection 30 mg (Completed)    Other Relevant Orders    X-Ray Knee AP LAT with [place] Right          Contusion of right knee, initial encounter    Acute pain of right knee  -     X-Ray Knee AP LAT with [place] Right; Future; Expected date: 05/21/2025  -     ketorolac injection 30 mg       Health Maintenance Topics with due status: Not Due       Topic Last Completion Date    DEXA Scan 05/07/2024    Diabetes Urine Screening 03/18/2025    Lipid Panel 03/18/2025    Hemoglobin A1c 03/18/2025       Future Appointments   Date Time Provider Department Center   8/12/2025  8:30 AM INFUSION, [facility]   10/22/2025  9:00 AM Rebekah Garcia MD [facility]        Follow up if symptoms worsen or fail to improve.     Signature:  Maria R Kimball DNP, FNP  [address] [place] [address]  Phone #: [phone]  Fax #: [phone]    Date of encounter: 5/21/25    Patient Instructions   Continue current meds, ice to affected area, , and rest. If pain persists, follow up with primary care provider for further care.

## 2025-05-21 NOTE — PATIENT INSTRUCTIONS
Continue current meds, ice to affected area, , and rest. If pain persists, follow up with primary care provider for further care.

## 2025-05-27 ENCOUNTER — RESULTS FOLLOW-UP (OUTPATIENT)
Dept: FAMILY MEDICINE | Facility: CLINIC | Age: 80
End: 2025-05-27

## 2025-07-11 DIAGNOSIS — I10 ESSENTIAL HYPERTENSION: Chronic | ICD-10-CM

## 2025-07-11 DIAGNOSIS — M54.2 NECK PAIN: Chronic | ICD-10-CM

## 2025-07-11 DIAGNOSIS — E11.9 TYPE 2 DIABETES MELLITUS WITHOUT COMPLICATION, WITHOUT LONG-TERM CURRENT USE OF INSULIN: Chronic | ICD-10-CM

## 2025-07-14 RX ORDER — MELOXICAM 15 MG/1
15 TABLET ORAL DAILY PRN
Qty: 90 TABLET | Refills: 1 | Status: SHIPPED | OUTPATIENT
Start: 2025-07-14

## 2025-07-14 RX ORDER — METFORMIN HYDROCHLORIDE 500 MG/1
500 TABLET, EXTENDED RELEASE ORAL 2 TIMES DAILY WITH MEALS
Qty: 180 TABLET | Refills: 1 | Status: SHIPPED | OUTPATIENT
Start: 2025-07-14

## 2025-07-14 RX ORDER — HYDROCHLOROTHIAZIDE 25 MG/1
25 TABLET ORAL DAILY
Qty: 90 TABLET | Refills: 1 | Status: SHIPPED | OUTPATIENT
Start: 2025-07-14

## 2025-07-30 ENCOUNTER — HOSPITAL ENCOUNTER (OUTPATIENT)
Dept: RADIOLOGY | Facility: HOSPITAL | Age: 80
Discharge: HOME | End: 2025-07-30
Payer: MEDICARE

## 2025-07-30 ENCOUNTER — OFFICE VISIT (OUTPATIENT)
Dept: FAMILY MEDICINE | Facility: CLINIC | Age: 80
End: 2025-07-30
Payer: MEDICARE

## 2025-07-30 VITALS
HEART RATE: 62 BPM | OXYGEN SATURATION: 98 % | WEIGHT: 188 LBS | BODY MASS INDEX: 37.9 KG/M2 | DIASTOLIC BLOOD PRESSURE: 73 MMHG | HEIGHT: 59 IN | SYSTOLIC BLOOD PRESSURE: 144 MMHG | RESPIRATION RATE: 19 BRPM | TEMPERATURE: 97 F

## 2025-07-30 DIAGNOSIS — R52 PAIN: ICD-10-CM

## 2025-07-30 DIAGNOSIS — R52 PAIN: Primary | ICD-10-CM

## 2025-07-30 DIAGNOSIS — K21.9 GASTROESOPHAGEAL REFLUX DISEASE, UNSPECIFIED WHETHER ESOPHAGITIS PRESENT: Chronic | ICD-10-CM

## 2025-07-30 DIAGNOSIS — I10 ESSENTIAL HYPERTENSION: Chronic | ICD-10-CM

## 2025-07-30 DIAGNOSIS — G56.03 BILATERAL CARPAL TUNNEL SYNDROME: Chronic | ICD-10-CM

## 2025-07-30 DIAGNOSIS — E78.2 MIXED HYPERLIPIDEMIA: Chronic | ICD-10-CM

## 2025-07-30 DIAGNOSIS — M54.2 NECK PAIN: Chronic | ICD-10-CM

## 2025-07-30 DIAGNOSIS — E87.6 LOW BLOOD POTASSIUM: Chronic | ICD-10-CM

## 2025-07-30 DIAGNOSIS — F33.41 RECURRENT MAJOR DEPRESSIVE DISORDER, IN PARTIAL REMISSION: Chronic | ICD-10-CM

## 2025-07-30 DIAGNOSIS — F03.918 DEMENTIA WITH BEHAVIORAL DISTURBANCE: Chronic | ICD-10-CM

## 2025-07-30 PROCEDURE — 73562 X-RAY EXAM OF KNEE 3: CPT | Mod: TC,PN,LT

## 2025-07-30 PROCEDURE — 73502 X-RAY EXAM HIP UNI 2-3 VIEWS: CPT | Mod: TC,PN,LT

## 2025-07-30 PROCEDURE — 71046 X-RAY EXAM CHEST 2 VIEWS: CPT | Mod: 26,,, | Performed by: RADIOLOGY

## 2025-07-30 PROCEDURE — 99214 OFFICE O/P EST MOD 30 MIN: CPT | Mod: ,,,

## 2025-07-30 PROCEDURE — 71046 X-RAY EXAM CHEST 2 VIEWS: CPT | Mod: TC,PN

## 2025-07-30 PROCEDURE — 96372 THER/PROPH/DIAG INJ SC/IM: CPT | Mod: ,,,

## 2025-07-30 RX ORDER — KETOROLAC TROMETHAMINE 10 MG/1
10 TABLET, FILM COATED ORAL EVERY 6 HOURS PRN
Qty: 20 TABLET | Refills: 0 | Status: SHIPPED | OUTPATIENT
Start: 2025-07-30 | End: 2025-08-04

## 2025-07-30 RX ORDER — NAPROXEN SODIUM 550 MG/1
550 TABLET ORAL 2 TIMES DAILY WITH MEALS
Qty: 180 TABLET | Refills: 1 | Status: SHIPPED | OUTPATIENT
Start: 2025-07-30

## 2025-07-30 RX ORDER — METOPROLOL TARTRATE 25 MG/1
25 TABLET, FILM COATED ORAL 2 TIMES DAILY
Qty: 180 TABLET | Refills: 1 | Status: SHIPPED | OUTPATIENT
Start: 2025-07-30

## 2025-07-30 RX ORDER — KETOROLAC TROMETHAMINE 30 MG/ML
30 INJECTION, SOLUTION INTRAMUSCULAR; INTRAVENOUS
Status: COMPLETED | OUTPATIENT
Start: 2025-07-30 | End: 2025-07-30

## 2025-07-30 RX ORDER — DONEPEZIL HYDROCHLORIDE 5 MG/1
5 TABLET, FILM COATED ORAL DAILY
Qty: 90 TABLET | Refills: 1 | Status: SHIPPED | OUTPATIENT
Start: 2025-07-30

## 2025-07-30 RX ORDER — POTASSIUM CHLORIDE 1500 MG/1
20 TABLET, EXTENDED RELEASE ORAL DAILY
Qty: 90 TABLET | Refills: 1 | Status: SHIPPED | OUTPATIENT
Start: 2025-07-30

## 2025-07-30 RX ORDER — OMEPRAZOLE 40 MG/1
40 CAPSULE, DELAYED RELEASE ORAL DAILY
Qty: 90 CAPSULE | Refills: 1 | Status: SHIPPED | OUTPATIENT
Start: 2025-07-30

## 2025-07-30 RX ORDER — ROSUVASTATIN CALCIUM 20 MG/1
20 TABLET, COATED ORAL NIGHTLY
Qty: 90 TABLET | Refills: 1 | Status: SHIPPED | OUTPATIENT
Start: 2025-07-30

## 2025-07-30 RX ORDER — DULOXETIN HYDROCHLORIDE 30 MG/1
CAPSULE, DELAYED RELEASE ORAL
Qty: 90 CAPSULE | Refills: 1 | Status: SHIPPED | OUTPATIENT
Start: 2025-07-30

## 2025-07-30 RX ORDER — CITALOPRAM 20 MG/1
20 TABLET ORAL DAILY
Qty: 90 TABLET | Refills: 1 | Status: SHIPPED | OUTPATIENT
Start: 2025-07-30

## 2025-07-30 RX ADMIN — KETOROLAC TROMETHAMINE 30 MG: 30 INJECTION, SOLUTION INTRAMUSCULAR; INTRAVENOUS at 10:07

## 2025-07-30 NOTE — PROGRESS NOTES
Subjective     Patient ID: Dayanara Monae is a 79 y.o. female.    Chief Complaint: Fall (Patient fell last Friday and hurt her left knee and left breast. She has some bruising to left breast and left hip along with some pain> says it hurts to breath along with SOB. ), Establish Care (Prior PCP was DR. Barahona), and Cough (Patient states when she coughs the pain is worse )    HPI  Review of Systems       Objective     Physical Exam  Vitals and nursing note reviewed.   Constitutional:       Appearance: Normal appearance.   HENT:      Head: Normocephalic and atraumatic.      Right Ear: Tympanic membrane, ear canal and external ear normal.      Left Ear: Tympanic membrane, ear canal and external ear normal.      Nose: Nose normal.      Mouth/Throat:      Mouth: Mucous membranes are moist.      Pharynx: Oropharynx is clear.   Eyes:      Pupils: Pupils are equal, round, and reactive to light.   Cardiovascular:      Rate and Rhythm: Normal rate and regular rhythm.      Pulses: Normal pulses.      Heart sounds: Normal heart sounds.   Pulmonary:      Effort: Pulmonary effort is normal.      Breath sounds: Normal breath sounds.   Abdominal:      General: Bowel sounds are normal.      Palpations: Abdomen is soft.   Musculoskeletal:         General: No deformity.   Lymphadenopathy:      Cervical: No cervical adenopathy.   Skin:     General: Skin is warm.      Capillary Refill: Capillary refill takes less than 2 seconds.      Findings: Ecchymosis present.          Neurological:      General: No focal deficit present.      Mental Status: She is alert.   Psychiatric:         Mood and Affect: Mood normal.            Assessment and Plan     1. Pain  -     X-Ray Chest PA And Lateral; Future; Expected date: 07/30/2025  -     X-Ray Knee AP LAT with [place] Left; Future; Expected date: 07/30/2025  -     Cancel: X-Ray Hip 1 View Left (with Pelvis when performed); Future; Expected date: 07/30/2025  -     X-Ray Hip 2 or 3 views Left with  Pelvis when performed; Future; Expected date: 07/30/2025    2. Mixed hyperlipidemia  -     rosuvastatin (CRESTOR) 20 MG tablet; Take 1 tablet (20 mg total) by mouth every evening. For CHOLESTEROL  Dispense: 90 tablet; Refill: 1    3. Low blood potassium  -     potassium chloride (K-TAB) 20 mEq; Take 1 tablet (20 mEq total) by mouth once daily.  Dispense: 90 tablet; Refill: 1    4. Gastroesophageal reflux disease, unspecified whether esophagitis present  -     omeprazole (PRILOSEC) 40 MG capsule; Take 1 capsule (40 mg total) by mouth once daily. For STOMACH  Dispense: 90 capsule; Refill: 1    5. Neck pain  -     naproxen sodium (ANAPROX) 550 MG tablet; Take 1 tablet (550 mg total) by mouth 2 (two) times daily with meals. For PAIN and INFLAMMATION  Dispense: 180 tablet; Refill: 1  -     DULoxetine (CYMBALTA) 30 MG capsule; TAKE 1 CAPSULE BY MOUTH ONCE DAILY FOR  JOINT  PAIN  Dispense: 90 capsule; Refill: 1  -     ketorolac injection 30 mg  -     ketorolac (TORADOL) 10 mg tablet; Take 1 tablet (10 mg total) by mouth every 6 (six) hours as needed for Pain.  Dispense: 20 tablet; Refill: 0    6. Bilateral carpal tunnel syndrome  -     naproxen sodium (ANAPROX) 550 MG tablet; Take 1 tablet (550 mg total) by mouth 2 (two) times daily with meals. For PAIN and INFLAMMATION  Dispense: 180 tablet; Refill: 1    7. Essential hypertension  Overview:   - Goal blood pressure for most patients is less than 130/85. Both numbers are important. If you have questions about your specific goal blood pressure, please ask at your clinic visits.   - Check blood pressure outside of clinic, record the numbers, and bring the log to all your office visits.   - If you have any chest pain, SOB, or other concerning symptoms, report these immediately, or go to the nearest ER.    - Recommend cardiovascular exercise, at least 3 times per week, for at least 15 minutes.   - Eat a heart healthy diet.       Orders:  -     metoprolol tartrate (LOPRESSOR) 25  MG tablet; Take 1 tablet (25 mg total) by mouth 2 (two) times daily. For HEART  Dispense: 180 tablet; Refill: 1    8. Dementia with behavioral disturbance  -     donepeziL (ARICEPT) 5 MG tablet; Take 1 tablet (5 mg total) by mouth once daily. For MEMORY  Dispense: 90 tablet; Refill: 1    9. Recurrent major depressive disorder, in partial remission  -     citalopram (CELEXA) 20 MG tablet; Take 1 tablet (20 mg total) by mouth once daily. For MOOD  Dispense: 90 tablet; Refill: 1        No fractures noted on review of imaging in clinic.  Advised to splint when coughing, will trial Toradol.  Depression is well controlled on current regimen advised to continue patient advised to monitor blood pressure at home.  If elevation persists follow-up in clinic. Will request records from neuro to review before accomplishing another workup for dementia         No follow-ups on file.

## 2025-07-30 NOTE — LETTER
AUTHORIZATION FOR RELEASE OF   CONFIDENTIAL INFORMATION    Dear Pacheco Mejia,    We are seeing Dayanara Monae, date of birth 1945, in the clinic at [facility] saw by CHARLEE Pereira. Dayanara Monae has an outstanding lab/procedure at the time we reviewed her chart. In order to help keep her health information updated, she has authorized us to request the following medical record(s):        (  )  MAMMOGRAM                                      (  )  COLONOSCOPY      (  )  PAP SMEAR                                          (  )  OUTSIDE LAB RESULTS     (  )  DEXA SCAN                                          (  )  EYE EXAM            (  )  FOOT EXAM                                          (X)  ENTIRE RECORD     (  )  OUTSIDE IMMUNIZATIONS                 (  )  _______________         Please fax records to CHARLEE Pereira, [phone]     If you have any questions, please contact June at [phone].           Patient Name: Dayanara Monae  : 1945  Patient Phone #: [phone]

## 2025-08-08 DIAGNOSIS — M81.0 AGE-RELATED OSTEOPOROSIS WITHOUT CURRENT PATHOLOGICAL FRACTURE: Primary | Chronic | ICD-10-CM

## 2025-08-12 ENCOUNTER — INFUSION (OUTPATIENT)
Dept: INFUSION THERAPY | Facility: HOSPITAL | Age: 80
End: 2025-08-12
Payer: MEDICARE

## 2025-08-12 VITALS — BODY MASS INDEX: 37.91 KG/M2 | WEIGHT: 188.06 LBS | HEIGHT: 59 IN

## 2025-08-12 DIAGNOSIS — M81.0 AGE-RELATED OSTEOPOROSIS WITHOUT CURRENT PATHOLOGICAL FRACTURE: Primary | ICD-10-CM

## 2025-08-12 PROCEDURE — 63600175 PHARM REV CODE 636 W HCPCS: Mod: JZ,TB

## 2025-08-12 PROCEDURE — 96372 THER/PROPH/DIAG INJ SC/IM: CPT

## 2025-08-12 RX ADMIN — DENOSUMAB 60 MG: 60 INJECTION SUBCUTANEOUS at 08:08
